# Patient Record
Sex: FEMALE | Race: BLACK OR AFRICAN AMERICAN | NOT HISPANIC OR LATINO | Employment: OTHER | URBAN - METROPOLITAN AREA
[De-identification: names, ages, dates, MRNs, and addresses within clinical notes are randomized per-mention and may not be internally consistent; named-entity substitution may affect disease eponyms.]

---

## 2017-01-23 ENCOUNTER — OFFICE VISIT (OUTPATIENT)
Dept: FAMILY MEDICINE | Facility: CLINIC | Age: 67
End: 2017-01-23
Payer: MEDICARE

## 2017-01-23 VITALS
HEART RATE: 118 BPM | WEIGHT: 205.5 LBS | HEIGHT: 63 IN | DIASTOLIC BLOOD PRESSURE: 78 MMHG | SYSTOLIC BLOOD PRESSURE: 122 MMHG | OXYGEN SATURATION: 96 % | TEMPERATURE: 98 F | BODY MASS INDEX: 36.41 KG/M2

## 2017-01-23 DIAGNOSIS — E78.5 HYPERLIPIDEMIA, UNSPECIFIED HYPERLIPIDEMIA TYPE: ICD-10-CM

## 2017-01-23 DIAGNOSIS — D05.12 DUCTAL CARCINOMA IN SITU (DCIS) OF LEFT BREAST: ICD-10-CM

## 2017-01-23 DIAGNOSIS — E11.9 DIABETES MELLITUS TYPE 2 WITHOUT RETINOPATHY: ICD-10-CM

## 2017-01-23 DIAGNOSIS — J45.909 INTRINSIC ASTHMA, UNSPECIFIED: ICD-10-CM

## 2017-01-23 DIAGNOSIS — I10 ESSENTIAL HYPERTENSION: ICD-10-CM

## 2017-01-23 DIAGNOSIS — Z00.00 ROUTINE MEDICAL EXAM: Primary | ICD-10-CM

## 2017-01-23 DIAGNOSIS — R53.83 FATIGUE, UNSPECIFIED TYPE: ICD-10-CM

## 2017-01-23 DIAGNOSIS — Z86.010 HISTORY OF COLONIC POLYPS: ICD-10-CM

## 2017-01-23 PROBLEM — Z86.0100 HISTORY OF COLONIC POLYPS: Status: ACTIVE | Noted: 2017-01-23

## 2017-01-23 PROBLEM — R10.33 PERIUMBILICAL ABDOMINAL PAIN: Status: ACTIVE | Noted: 2017-01-23

## 2017-01-23 PROBLEM — N20.0 KIDNEY STONES: Status: ACTIVE | Noted: 2017-01-23

## 2017-01-23 PROCEDURE — 90662 IIV NO PRSV INCREASED AG IM: CPT | Mod: S$GLB,,, | Performed by: INTERNAL MEDICINE

## 2017-01-23 PROCEDURE — 90670 PCV13 VACCINE IM: CPT | Mod: S$GLB,,, | Performed by: INTERNAL MEDICINE

## 2017-01-23 PROCEDURE — G0008 ADMIN INFLUENZA VIRUS VAC: HCPCS | Mod: S$GLB,,, | Performed by: INTERNAL MEDICINE

## 2017-01-23 PROCEDURE — 1159F MED LIST DOCD IN RCRD: CPT | Mod: S$GLB,,, | Performed by: INTERNAL MEDICINE

## 2017-01-23 PROCEDURE — 2022F DILAT RTA XM EVC RTNOPTHY: CPT | Mod: S$GLB,,, | Performed by: INTERNAL MEDICINE

## 2017-01-23 PROCEDURE — 99999 PR PBB SHADOW E&M-EST. PATIENT-LVL IV: CPT | Mod: PBBFAC,,, | Performed by: INTERNAL MEDICINE

## 2017-01-23 PROCEDURE — 3074F SYST BP LT 130 MM HG: CPT | Mod: S$GLB,,, | Performed by: INTERNAL MEDICINE

## 2017-01-23 PROCEDURE — 3078F DIAST BP <80 MM HG: CPT | Mod: S$GLB,,, | Performed by: INTERNAL MEDICINE

## 2017-01-23 PROCEDURE — 3046F HEMOGLOBIN A1C LEVEL >9.0%: CPT | Mod: S$GLB,,, | Performed by: INTERNAL MEDICINE

## 2017-01-23 PROCEDURE — 99387 INIT PM E/M NEW PAT 65+ YRS: CPT | Mod: 25,S$GLB,, | Performed by: INTERNAL MEDICINE

## 2017-01-23 PROCEDURE — 99214 OFFICE O/P EST MOD 30 MIN: CPT | Mod: 25,S$GLB,, | Performed by: INTERNAL MEDICINE

## 2017-01-23 PROCEDURE — G0009 ADMIN PNEUMOCOCCAL VACCINE: HCPCS | Mod: S$GLB,,, | Performed by: INTERNAL MEDICINE

## 2017-01-23 PROCEDURE — 4010F ACE/ARB THERAPY RXD/TAKEN: CPT | Mod: S$GLB,,, | Performed by: INTERNAL MEDICINE

## 2017-01-23 RX ORDER — METFORMIN HYDROCHLORIDE EXTENDED-RELEASE TABLETS 1000 MG/1
2000 TABLET, FILM COATED, EXTENDED RELEASE ORAL
Qty: 180 TABLET | Refills: 0 | Status: SHIPPED | OUTPATIENT
Start: 2017-01-23 | End: 2017-04-07 | Stop reason: SDUPTHER

## 2017-01-23 RX ORDER — LOSARTAN POTASSIUM AND HYDROCHLOROTHIAZIDE 25; 100 MG/1; MG/1
1 TABLET ORAL DAILY
Qty: 90 TABLET | Refills: 12 | Status: SHIPPED | OUTPATIENT
Start: 2017-01-23 | End: 2018-01-09 | Stop reason: SDUPTHER

## 2017-01-23 RX ORDER — LEVOCETIRIZINE DIHYDROCHLORIDE 5 MG/1
5 TABLET, FILM COATED ORAL NIGHTLY
Qty: 90 TABLET | Refills: 12 | Status: SHIPPED | OUTPATIENT
Start: 2017-01-23 | End: 2017-04-07

## 2017-01-23 RX ORDER — TRAMADOL HYDROCHLORIDE 50 MG/1
TABLET ORAL
Qty: 100 TABLET | Refills: 3 | Status: SHIPPED | OUTPATIENT
Start: 2017-01-23 | End: 2017-08-23

## 2017-01-23 NOTE — PROGRESS NOTES
Chief complaint: New patient physical     66-year-old black female seen by primary care at [facility] over 3 years ago should she is a new patient again.  The primary care that she has seen documented in [facility] is not an [facility].  She is followed outside for breast cancer screening having recently had some surgery for some carcinoma in situ.  She's up-to-date on her colonoscopy.        ROS:   CONST: weight stable. EYES: no vision change. ENT: no sore throat. CV: no chest pain w/ exertion. RESP: no shortness of breath. GI: no nausea, vomiting, diarrhea. No dysphagia. : no urinary issues. MUSCULOSKELETAL: no new myalgias or arthralgias. SKIN: no new changes. NEURO: no focal deficits. PSYCH: no new issues. ENDOCRINE: no polyuria. HEME: no lymph nodes. ALLERGY: no general pruritis.     Past medical history:  1.  Diabetes  2.  Hypertension  3.  Asthma and COPD, nonsmoker, asthmatic since having children  4.  Arthritis of the knees  5.  Carpal tunnel syndrome  6.  Hyperlipidemia  7.  Cholecystectomy   8.  Left breast ductal carcinoma in situ  9.  Kidney stones - Dr Sargent did laser  10.  Periumbilical pain, EGD, colonoscopy and CT scan done by GI Dr. Ramírez - apparently the kidney stone   11.  Colon polyps , 3 years  12.  Possible underlying sleep apnea  13.  Prevnar given 2017      Family history: Father  at age 80 of prostate cancer.  Mother age 84 with diabetes.  Unspecified diabetes heart problems hypertension in the family.  Brother  of stomach cancer.  4 other brothers in 10 sisters with no stated problems.    Social history: Disabled apparently due to asthma.   13 years with a prior marriage and has 4 children.  Does not smoke and never did.  Does not drink.    Gen: no distress  EYES: conjunctiva clear, non-icteric, PERRL  ENT: nose clear, nasal mucosa normal, oropharynx clear and moist, teeth good  NECK:supple, thyroid non-palpable  RESP: effort is good, lungs  clear  CV: heart RRR w/o murmur, gallops or rubs; no carotid bruits, no edema  GI: abdomen soft, non-distended, non-tender, no hepatosplenomegaly  MS: gait normal, no clubbing or cyanosis of the digits  SKIN: no rashes, warm to touch   Protective Sensation (w/ 10 gram monofilament):  Right: Intact  Left: Intact    Visual Inspection:  Normal -  Bilateral    Pedal Pulses:   Right: Present  Left: Present    Posterior tibialis:   Right:Present  Left: Present    Abigail was seen today for establish care.    Diagnoses and all orders for this visit:    Routine medical exam, new to the clinic, apparently up-to-date on breast cancer screening and colon cancer screening area we discussed the need to improve diet exercise and lose weight and follow-up every 3 months    History of colonic polyps, up-to-date                                                    Additional evaluation and management issues:    Additionally, patient has other medical issues to address today separate from her physical.  She recently had surgery on the left breast for carcinoma in situ.  His been one month but she still has a moderate amount of pain especially if the left rest touch is on something.  She requests a refill of some pain medication such as tramadol and I think that would be appropriate rather than the Percocet at this point.  She does have follow-up with her surgeon.  She has uncontrolled diabetes without any obvious complication.  We did her foot exam today and will refer to optometry.  We will update all her lab work.  She's only been taking 1 metformin 1000 mg per day not due to side effects but just simply because she thought the dose was high.  I explained her the 2000 mg is the total dose and without it she may well need additional meds and may well need more medicine either way.  We will check A1c with a goal below 6.5 and if not ago we will increase it to 2 pills per day.    She needs refills of her antihypertensive which appears to  be under good control.  She has hyperlipidemia with an LDL which was not well controlled and she is not on a statin so we will reassess and address and discuss.  She does have some daytime fatigue causing her to take naps.  We discussed having family assess for sleep apnea symptoms and discussed the benefits and complications of sleep apnea treatment.  She does state her flu shot and pneumonia vaccine today.  She has a history of asthma which occurred after having children but has improved lately.  All the separate issues reviewed and patient counseled an evaluation and management of all the separate issues we based on time counseling.  Total time over 25 minutes with over 50% counseling.        Assessment and plan:          Ductal carcinoma in situ (DCIS) of left breast/Breast pain ongoing after surgery, tramadol prescribed    Uncontrolled type 2 diabetes mellitus without complication, without long-term current use of insulin, Reassess and possibly increased the metformin back to the 2000 mg per day, discussed possible Amaryl, discussed diet improvement  -     Cancel: Ambulatory referral to Podiatry  -     Ambulatory referral to Optometry  -     Hemoglobin A1c; Future  -     Lipid panel; Future  -     Microalbumin/creatinine urine ratio; Future  -     CBC auto differential; Future  -     TSH; Future  -     Comprehensive metabolic panel; Future    Essential hypertension, Chronic and stable    Hyperlipidemia, unspecified hyperlipidemia type, Reassess and likely will need statin    Intrinsic asthma, unspecified, Chronic and stable, provide vaccines    Diabetes mellitus type 2 without retinopathy    Fatigue, unspecified type, Assess for underlying sleep apnea clinically first    Other orders  -     Influenza - High Dose (65+) (PF) (IM)  -     Pneumococcal Conjugate Vaccine (13 Valent) (IM)  -     metformin (FORTAMET) 1,000 mg 24 hr tablet; Take 2 tablets (2,000 mg total) by mouth daily with breakfast.  -      losartan-hydrochlorothiazide 100-25 mg (HYZAAR) 100-25 mg per tablet; Take 1 tablet by mouth once daily.  -     levocetirizine (XYZAL) 5 MG tablet; Take 1 tablet (5 mg total) by mouth every evening.  -     tramadol (ULTRAM) 50 mg tablet; 1-2 q 6hr prn

## 2017-01-27 ENCOUNTER — TELEPHONE (OUTPATIENT)
Dept: FAMILY MEDICINE | Facility: CLINIC | Age: 67
End: 2017-01-27

## 2017-01-27 ENCOUNTER — LAB VISIT (OUTPATIENT)
Dept: LAB | Facility: HOSPITAL | Age: 67
End: 2017-01-27
Attending: INTERNAL MEDICINE
Payer: MEDICARE

## 2017-01-27 LAB
ALBUMIN SERPL BCP-MCNC: 3.9 G/DL
ALP SERPL-CCNC: 88 U/L
ALT SERPL W/O P-5'-P-CCNC: 32 U/L
ANION GAP SERPL CALC-SCNC: 11 MMOL/L
AST SERPL-CCNC: 27 U/L
BASOPHILS # BLD AUTO: 0.02 K/UL
BASOPHILS NFR BLD: 0.2 %
BILIRUB SERPL-MCNC: 0.3 MG/DL
BUN SERPL-MCNC: 17 MG/DL
CALCIUM SERPL-MCNC: 9.7 MG/DL
CHLORIDE SERPL-SCNC: 103 MMOL/L
CHOLEST/HDLC SERPL: 6.7 {RATIO}
CO2 SERPL-SCNC: 26 MMOL/L
CREAT SERPL-MCNC: 1.1 MG/DL
DIFFERENTIAL METHOD: NORMAL
EOSINOPHIL # BLD AUTO: 0.2 K/UL
EOSINOPHIL NFR BLD: 2.5 %
ERYTHROCYTE [DISTWIDTH] IN BLOOD BY AUTOMATED COUNT: 12.8 %
EST. GFR  (AFRICAN AMERICAN): >60 ML/MIN/1.73 M^2
EST. GFR  (NON AFRICAN AMERICAN): 52.8 ML/MIN/1.73 M^2
GLUCOSE SERPL-MCNC: 140 MG/DL
HCT VFR BLD AUTO: 37.8 %
HDL/CHOLESTEROL RATIO: 15 %
HDLC SERPL-MCNC: 227 MG/DL
HDLC SERPL-MCNC: 34 MG/DL
HGB BLD-MCNC: 12.6 G/DL
LDLC SERPL CALC-MCNC: 136.4 MG/DL
LYMPHOCYTES # BLD AUTO: 3.3 K/UL
LYMPHOCYTES NFR BLD: 39.4 %
MCH RBC QN AUTO: 29.8 PG
MCHC RBC AUTO-ENTMCNC: 33.3 %
MCV RBC AUTO: 89 FL
MONOCYTES # BLD AUTO: 0.7 K/UL
MONOCYTES NFR BLD: 8 %
NEUTROPHILS # BLD AUTO: 4.2 K/UL
NEUTROPHILS NFR BLD: 49.8 %
NONHDLC SERPL-MCNC: 193 MG/DL
PLATELET # BLD AUTO: 319 K/UL
PMV BLD AUTO: 11 FL
POTASSIUM SERPL-SCNC: 3.6 MMOL/L
PROT SERPL-MCNC: 8.5 G/DL
RBC # BLD AUTO: 4.23 M/UL
SODIUM SERPL-SCNC: 140 MMOL/L
TRIGL SERPL-MCNC: 283 MG/DL
TSH SERPL DL<=0.005 MIU/L-ACNC: 1.72 UIU/ML
WBC # BLD AUTO: 8.45 K/UL

## 2017-01-27 PROCEDURE — 36415 COLL VENOUS BLD VENIPUNCTURE: CPT | Mod: PO

## 2017-01-27 PROCEDURE — 80061 LIPID PANEL: CPT

## 2017-01-27 PROCEDURE — 83036 HEMOGLOBIN GLYCOSYLATED A1C: CPT

## 2017-01-27 PROCEDURE — 85025 COMPLETE CBC W/AUTO DIFF WBC: CPT

## 2017-01-27 PROCEDURE — 84443 ASSAY THYROID STIM HORMONE: CPT

## 2017-01-27 PROCEDURE — 80053 COMPREHEN METABOLIC PANEL: CPT

## 2017-01-27 NOTE — TELEPHONE ENCOUNTER
Althea Valdivia MA        8:44 AM   Note      Spoke to patient and she states she would like to call back once she is able to afford to get glasses because her insurance does not cover glasses and she needs new ones since she lost the pair she had last year. Informed her that she can call the same number she calls to make an appointment with you when ever she is ready.

## 2017-01-27 NOTE — TELEPHONE ENCOUNTER
----- Message from Vi Nieto sent at 1/26/2017  4:14 PM CST -----  Contact: [phone]  Pt is returning the phone call Please call pt at your earliest convenience.  Thanks!

## 2017-01-28 LAB
ESTIMATED AVG GLUCOSE: 174 MG/DL
HBA1C MFR BLD HPLC: 7.7 %

## 2017-02-16 ENCOUNTER — TELEPHONE (OUTPATIENT)
Dept: FAMILY MEDICINE | Facility: CLINIC | Age: 67
End: 2017-02-16

## 2017-02-16 RX ORDER — ANASTROZOLE 1 MG/1
TABLET ORAL
Refills: 0 | COMMUNITY
Start: 2017-01-30 | End: 2017-08-23

## 2017-02-16 NOTE — TELEPHONE ENCOUNTER
Received medical clearance form for pt to have dental work done. Form in Dr. STONER box please advise

## 2017-04-07 ENCOUNTER — TELEPHONE (OUTPATIENT)
Dept: FAMILY MEDICINE | Facility: CLINIC | Age: 67
End: 2017-04-07

## 2017-04-07 ENCOUNTER — OFFICE VISIT (OUTPATIENT)
Dept: FAMILY MEDICINE | Facility: CLINIC | Age: 67
End: 2017-04-07
Payer: MEDICARE

## 2017-04-07 ENCOUNTER — CLINICAL SUPPORT (OUTPATIENT)
Dept: OPHTHALMOLOGY | Facility: CLINIC | Age: 67
End: 2017-04-07
Attending: INTERNAL MEDICINE
Payer: MEDICARE

## 2017-04-07 VITALS
DIASTOLIC BLOOD PRESSURE: 68 MMHG | BODY MASS INDEX: 36.8 KG/M2 | HEIGHT: 63 IN | OXYGEN SATURATION: 96 % | TEMPERATURE: 99 F | WEIGHT: 207.69 LBS | SYSTOLIC BLOOD PRESSURE: 128 MMHG | HEART RATE: 116 BPM

## 2017-04-07 DIAGNOSIS — J30.2 SEASONAL ALLERGIC RHINITIS, UNSPECIFIED ALLERGIC RHINITIS TRIGGER: ICD-10-CM

## 2017-04-07 DIAGNOSIS — I10 ESSENTIAL HYPERTENSION: ICD-10-CM

## 2017-04-07 DIAGNOSIS — E78.5 HYPERLIPIDEMIA, UNSPECIFIED HYPERLIPIDEMIA TYPE: ICD-10-CM

## 2017-04-07 PROCEDURE — 1159F MED LIST DOCD IN RCRD: CPT | Mod: S$GLB,,, | Performed by: INTERNAL MEDICINE

## 2017-04-07 PROCEDURE — 99214 OFFICE O/P EST MOD 30 MIN: CPT | Mod: S$GLB,,, | Performed by: INTERNAL MEDICINE

## 2017-04-07 PROCEDURE — 4010F ACE/ARB THERAPY RXD/TAKEN: CPT | Mod: S$GLB,,, | Performed by: INTERNAL MEDICINE

## 2017-04-07 PROCEDURE — 3045F PR MOST RECENT HEMOGLOBIN A1C LEVEL 7.0-9.0%: CPT | Mod: S$GLB,,, | Performed by: INTERNAL MEDICINE

## 2017-04-07 PROCEDURE — 92227 IMG RTA DETCJ/MNTR DS STAFF: CPT | Mod: S$GLB,,, | Performed by: OPHTHALMOLOGY

## 2017-04-07 PROCEDURE — 1157F ADVNC CARE PLAN IN RCRD: CPT | Mod: S$GLB,,, | Performed by: INTERNAL MEDICINE

## 2017-04-07 PROCEDURE — 1126F AMNT PAIN NOTED NONE PRSNT: CPT | Mod: S$GLB,,, | Performed by: INTERNAL MEDICINE

## 2017-04-07 PROCEDURE — 1160F RVW MEDS BY RX/DR IN RCRD: CPT | Mod: S$GLB,,, | Performed by: INTERNAL MEDICINE

## 2017-04-07 PROCEDURE — 3074F SYST BP LT 130 MM HG: CPT | Mod: S$GLB,,, | Performed by: INTERNAL MEDICINE

## 2017-04-07 PROCEDURE — 3078F DIAST BP <80 MM HG: CPT | Mod: S$GLB,,, | Performed by: INTERNAL MEDICINE

## 2017-04-07 PROCEDURE — 99999 PR PBB SHADOW E&M-EST. PATIENT-LVL III: CPT | Mod: PBBFAC,,, | Performed by: INTERNAL MEDICINE

## 2017-04-07 PROCEDURE — 99499 UNLISTED E&M SERVICE: CPT | Mod: S$GLB,,, | Performed by: INTERNAL MEDICINE

## 2017-04-07 PROCEDURE — 99999 PR PBB SHADOW E&M-EST. PATIENT-LVL II: CPT | Mod: PBBFAC,,,

## 2017-04-07 RX ORDER — ATORVASTATIN CALCIUM 10 MG/1
10 TABLET, FILM COATED ORAL DAILY
Qty: 90 TABLET | Refills: 3 | Status: SHIPPED | OUTPATIENT
Start: 2017-04-07 | End: 2017-08-23

## 2017-04-07 RX ORDER — METFORMIN HYDROCHLORIDE EXTENDED-RELEASE TABLETS 1000 MG/1
2000 TABLET, FILM COATED, EXTENDED RELEASE ORAL
Qty: 180 TABLET | Refills: 0 | Status: SHIPPED | OUTPATIENT
Start: 2017-04-07 | End: 2017-04-10

## 2017-04-07 RX ORDER — LORATADINE 10 MG/1
10 TABLET ORAL DAILY
Qty: 30 TABLET | Refills: 12 | Status: SHIPPED | OUTPATIENT
Start: 2017-04-07 | End: 2022-01-21

## 2017-04-07 NOTE — TELEPHONE ENCOUNTER
----- Message from [facility] NILDA Bonilla sent at 4/7/2017  3:32 PM CDT -----  Contact: [facility]  Request drug change on metformin (FORTAMET) 1,000 mg 24 hr tablet. Pt's insurance won't cover. Thanks!

## 2017-04-07 NOTE — PROGRESS NOTES
Chief complaint: Follow-up on blood work    66-year-old black female here review labs.  Her A1c of 7.7 is exactly the same as it was a year ago.  She is on maximum metformin.  We discussed the addition of Amaryl as well as dietary improvements and she would like to give diet improvements a period of time before we add other medication.  We discussed her hyperlipidemia with an LDL in the 130s associated with the low HDL and the fact that all diabetics should be on a statin.  She thinks it might of been on a statin in the past but can't remember any side effects.  We will start Lipitor 10 mg.  Her blood pressure appears to be under good control and I encouraged her to monitor more so at home.  She apparently did have a colonoscopy in 2016 and so I put that on health maintenance.  Records also show she might of had polyps in 2015.  She ultimately I'm sure will receive notice from the performing gastric neurologist.  Apparently her insurance will not cover her current prescription antihistamine and discussed that over-the-counter Claritin or Allegra or Zyrtec would be just as adequate and we will try to see if we can get Claritin by prescription since she is on a very limited income.  Because of this also we will set her up for the retina camera for diabetic eye screening since she probably can't afford copayment right now      ROS:   CONST: weight stable.  No vision problems, no neurological problems    Past medical history:  1.  Diabetes  2.  Hypertension  3.  Asthma and COPD, nonsmoker, asthmatic since having children  4.  Arthritis of the knees  5.  Carpal tunnel syndrome  6.  Hyperlipidemia  7.  Cholecystectomy 1999  8.  Left breast ductal carcinoma in situ  9.  Kidney stones - Dr Sargent did laser  10.  Periumbilical pain, EGD, 2016 colonoscopy and CT scan done by GI Dr. Ramírez - apparently the kidney stone   11.  Colon polyps October . 2015, 3 years  12.  Possible underlying sleep apnea  13.  Prevnar given  2017      Family history: Father  at age 80 of prostate cancer.  Mother age 84 with diabetes.  Unspecified diabetes heart problems hypertension in the family.  Brother  of stomach cancer.  4 other brothers in 10 sisters with no stated problems.    Social history: Disabled apparently due to asthma.   13 years with a prior marriage and has 4 children.  Does not smoke and never did.  Does not drink.    Gen: no distress  Abigail was seen today for diabetes and hypertension.    Diagnoses and all orders for this visit:    Uncontrolled type 2 diabetes mellitus without complication, without long-term current use of insulin, discussed the uncontrolled nature of her diabetes, additional medications, lifestyle modifications that need to take place.  We will arrange for retinopathy screening, refilled her metformin and she will follow-up when refills are needed but lab work.  Counseled regarding her diabetes lipids HDL hypertension and ALLERGIES.Total time over 25 minutes with over 50% counseling.  -     Diabetic Eye Screening Photo; Future    Hyperlipidemia, unspecified hyperlipidemia type, associated with low HDL, start statin    Essential hypertension, monitor at home more often, appears okay    Seasonal allergic rhinitis, unspecified allergic rhinitis trigger, continue antihistamine of choice    Other orders  -     Cancel: Ambulatory referral to Optometry  -     Cancel: Case request GI: COLONOSCOPY  -     Cancel: Hepatitis panel, acute; Future  -     atorvastatin (LIPITOR) 10 MG tablet; Take 1 tablet (10 mg total) by mouth once daily.  -     metformin (FORTAMET) 1,000 mg 24 hr tablet; Take 2 tablets (2,000 mg total) by mouth daily with breakfast.  -     loratadine (CLARITIN) 10 mg tablet; Take 1 tablet (10 mg total) by mouth once daily.

## 2017-04-07 NOTE — PROGRESS NOTES
HPI     67  Year here for screening for diabetic retinopathy with non dilated   fundus photo per Dr Rangel        Last edited by Zara Kim MA on 4/7/2017 12:04 PM.         Assessment /Plan     For exam results, see Encounter Report.    Uncontrolled type 2 diabetes mellitus without complication, without long-term current use of insulin  -     Diabetic Eye Screening Photo        67 year here for screening for diabetic retinopathy fundus photo please see Dr Oneill for interpretation

## 2017-04-07 NOTE — TELEPHONE ENCOUNTER
[employer] does not cover metformin extended release or long acting, has to be the plain dose. Please advise

## 2017-04-10 ENCOUNTER — TELEPHONE (OUTPATIENT)
Dept: OPHTHALMOLOGY | Facility: CLINIC | Age: 67
End: 2017-04-10

## 2017-04-10 RX ORDER — METFORMIN HYDROCHLORIDE 500 MG/1
1000 TABLET, EXTENDED RELEASE ORAL 2 TIMES DAILY
Qty: 120 TABLET | Refills: 3 | Status: SHIPPED | OUTPATIENT
Start: 2017-04-10 | End: 2017-08-02 | Stop reason: SDUPTHER

## 2017-04-10 NOTE — TELEPHONE ENCOUNTER
Notified pt of eye cam results, no bdr ou recommending to follow up in 4-6 mos with primary eye care physician.  Pt does not have interest in [facility] due to not wanting to come out of pocket.  Explained this would be medical and only if she had refraction/va checked the cost would be $40. Plus copay.  Pt declined stating she is going to look into it and if unable to find doctor that takes ins. At no cost, she will call back.

## 2017-04-13 DIAGNOSIS — Z11.59 NEED FOR HEPATITIS C SCREENING TEST: ICD-10-CM

## 2017-08-03 RX ORDER — METFORMIN HYDROCHLORIDE 500 MG/1
TABLET, EXTENDED RELEASE ORAL
Qty: 120 TABLET | Refills: 0 | Status: SHIPPED | OUTPATIENT
Start: 2017-08-03

## 2017-08-23 ENCOUNTER — OFFICE VISIT (OUTPATIENT)
Dept: PODIATRY | Facility: CLINIC | Age: 67
End: 2017-08-23
Payer: MEDICARE

## 2017-08-23 VITALS
BODY MASS INDEX: 36.8 KG/M2 | DIASTOLIC BLOOD PRESSURE: 72 MMHG | WEIGHT: 207.69 LBS | HEIGHT: 63 IN | SYSTOLIC BLOOD PRESSURE: 120 MMHG

## 2017-08-23 DIAGNOSIS — M79.672 FOOT PAIN, BILATERAL: ICD-10-CM

## 2017-08-23 DIAGNOSIS — M79.671 FOOT PAIN, BILATERAL: ICD-10-CM

## 2017-08-23 DIAGNOSIS — Q66.72 PES CAVUS OF LEFT FOOT: ICD-10-CM

## 2017-08-23 DIAGNOSIS — M20.42 HAMMER TOES OF BOTH FEET: ICD-10-CM

## 2017-08-23 DIAGNOSIS — E11.49 TYPE II DIABETES MELLITUS WITH NEUROLOGICAL MANIFESTATIONS: Primary | ICD-10-CM

## 2017-08-23 DIAGNOSIS — L84 CORN OR CALLUS: ICD-10-CM

## 2017-08-23 DIAGNOSIS — M20.41 HAMMER TOES OF BOTH FEET: ICD-10-CM

## 2017-08-23 DIAGNOSIS — Q66.71 PES CAVUS OF RIGHT FOOT: ICD-10-CM

## 2017-08-23 DIAGNOSIS — R20.2 PARESTHESIAS: ICD-10-CM

## 2017-08-23 PROCEDURE — 3045F PR MOST RECENT HEMOGLOBIN A1C LEVEL 7.0-9.0%: CPT | Mod: S$GLB,,, | Performed by: PODIATRIST

## 2017-08-23 PROCEDURE — 3074F SYST BP LT 130 MM HG: CPT | Mod: S$GLB,,, | Performed by: PODIATRIST

## 2017-08-23 PROCEDURE — 1159F MED LIST DOCD IN RCRD: CPT | Mod: S$GLB,,, | Performed by: PODIATRIST

## 2017-08-23 PROCEDURE — 99999 PR PBB SHADOW E&M-EST. PATIENT-LVL II: CPT | Mod: PBBFAC,,, | Performed by: PODIATRIST

## 2017-08-23 PROCEDURE — 99499 UNLISTED E&M SERVICE: CPT | Mod: S$GLB,,, | Performed by: PODIATRIST

## 2017-08-23 PROCEDURE — 99203 OFFICE O/P NEW LOW 30 MIN: CPT | Mod: S$GLB,,, | Performed by: PODIATRIST

## 2017-08-23 PROCEDURE — 4010F ACE/ARB THERAPY RXD/TAKEN: CPT | Mod: S$GLB,,, | Performed by: PODIATRIST

## 2017-08-23 PROCEDURE — 3008F BODY MASS INDEX DOCD: CPT | Mod: S$GLB,,, | Performed by: PODIATRIST

## 2017-08-23 PROCEDURE — 1125F AMNT PAIN NOTED PAIN PRSNT: CPT | Mod: S$GLB,,, | Performed by: PODIATRIST

## 2017-08-23 PROCEDURE — 3078F DIAST BP <80 MM HG: CPT | Mod: S$GLB,,, | Performed by: PODIATRIST

## 2017-08-23 RX ORDER — ECONAZOLE NITRATE 10 MG/G
CREAM TOPICAL
COMMUNITY
Start: 2017-08-02 | End: 2017-08-23

## 2017-08-23 RX ORDER — ALCLOMETASONE DIPROPIONATE 0.5 MG/G
CREAM TOPICAL
COMMUNITY
Start: 2017-08-02 | End: 2017-08-23 | Stop reason: ALTCHOICE

## 2017-08-23 RX ORDER — ANASTROZOLE 1 MG/1
1 TABLET ORAL DAILY
COMMUNITY
End: 2022-01-20 | Stop reason: CLARIF

## 2017-08-23 RX ORDER — TRAMADOL HYDROCHLORIDE 50 MG/1
50 TABLET ORAL EVERY 8 HOURS PRN
Qty: 30 TABLET | Refills: 0 | Status: SHIPPED | OUTPATIENT
Start: 2017-08-23 | End: 2017-09-22

## 2017-08-23 NOTE — PROGRESS NOTES
Subjective:      Patient ID: Abigail Pierre is a 67 y.o. female.    Chief Complaint: Diabetes Mellitus (pcp Rm/ 4-7-17); Diabetic Foot Exam; Nail Care; Callouses; and Foot Pain    Abigail is a 67 y.o. female who presents to the clinic upon referral from Dr. Washington  for evaluation and treatment of diabetic feet. Abigail has a past medical history of Arthritis; Carpal tunnel syndrome (1/2/2014); COPD (chronic obstructive pulmonary disease); Diabetes mellitus type II, uncontrolled (1/23/2017); Diabetes mellitus, type 2; DJD (degenerative joint disease) of knee (1/2/2014); Ductal carcinoma in situ (DCIS) of left breast (12/22/2016); History of colonic polyps (1/23/2017); Hyperlipidemia; Hypertension; Intrinsic asthma, unspecified (1/2/2014); Kidney stones (1/23/2017); and Periumbilical abdominal pain (1/23/2017). Patient relates no major problem with feet. Only complaints today consist of painful calluses on bottom of both feet. These have been present for years. Has tried moisturizer and DM shoes > 2 years ago per direction of previous Podiatrist. These helped temporarily. Now due to insurance change she is here for establishing care. Also has mild tingling/burning in feet. lyrica helps symptoms but does not cover all of pain. Inquiring about other options. Tramadol helps but she is out.    PCP: Danilo Rangel MD    Date Last Seen by PCP:   Chief Complaint   Patient presents with    Diabetes Mellitus     pcp Rm/ 4-7-17    Diabetic Foot Exam    Nail Care    Callouses    Foot Pain       Current shoe gear: Flip-flops    Hemoglobin A1C   Date Value Ref Range Status   01/27/2017 7.7 (H) 4.5 - 6.2 % Final     Comment:     According to ADA guidelines, hemoglobin A1C <7.0% represents  optimal control in non-pregnant diabetic patients.  Different  metrics may apply to specific populations.   Standards of Medical Care in Diabetes - 2016.  For the purpose of screening for the presence of diabetes:  <5.7%      Consistent with the absence of diabetes  5.7-6.4%  Consistent with increasing risk for diabetes   (prediabetes)  >or=6.5%  Consistent with diabetes  Currently no consensus exists for use of hemoglobin A1C  for diagnosis of diabetes for children.       Past Medical History:   Diagnosis Date    Arthritis     Carpal tunnel syndrome 1/2/2014    COPD (chronic obstructive pulmonary disease)     Diabetes mellitus type II, uncontrolled 1/23/2017    Diabetes mellitus, type 2     DJD (degenerative joint disease) of knee 1/2/2014    Ductal carcinoma in situ (DCIS) of left breast 12/22/2016    History of colonic polyps 1/23/2017    Around 2015    Hyperlipidemia     Hypertension     Intrinsic asthma, unspecified 1/2/2014    Kidney stones 1/23/2017    On CT done by GI in 2015 -sent to Dr Sargent?    Periumbilical abdominal pain 1/23/2017    Seen by Cristhian Mena 10/15 -ct, colon and EGD (normal EGD)       Past Surgical History:   Procedure Laterality Date    BREAST SURGERY      CHOLECYSTECTOMY      CYSTOSCOPY W/ LASER LITHOTRIPSY         Family History   Problem Relation Age of Onset    Colon cancer Father     Heart disease Mother     Hypertension Mother     Diabetes Mother     Hyperlipidemia Mother     Breast cancer Neg Hx     Ovarian cancer Neg Hx        Social History     Social History    Marital status:      Spouse name: N/A    Number of children: N/A    Years of education: N/A     Social History Main Topics    Smoking status: Never Smoker    Smokeless tobacco: Never Used    Alcohol use No    Drug use: No    Sexual activity: Not Asked     Other Topics Concern    None     Social History Narrative    None       Current Outpatient Prescriptions   Medication Sig Dispense Refill    anastrozole (ARIMIDEX) 1 mg Tab Take 1 mg by mouth once daily.      loratadine (CLARITIN) 10 mg tablet Take 1 tablet (10 mg total) by mouth once daily. 30 tablet 12    losartan-hydrochlorothiazide 100-25 mg  (HYZAAR) 100-25 mg per tablet Take 1 tablet by mouth once daily. 90 tablet 12    metformin (GLUCOPHAGE-XR) 500 MG 24 hr tablet TAKE 2 TABLETS(1000 MG) BY MOUTH TWICE DAILY 120 tablet 0    tramadol (ULTRAM) 50 mg tablet Take 1 tablet (50 mg total) by mouth every 8 (eight) hours as needed for Pain. 30 tablet 0     No current facility-administered medications for this visit.        Review of patient's allergies indicates:   Allergen Reactions    Sulfa (sulfonamide antibiotics) Itching and Rash           Review of Systems   Constitution: Negative for chills and fever.   Cardiovascular: Negative for chest pain, claudication and leg swelling.   Respiratory: Negative for cough and shortness of breath.    Skin: Positive for dry skin and suspicious lesions (calluses).   Musculoskeletal:        Foot/toe pain   Gastrointestinal: Negative for nausea and vomiting.   Neurological: Positive for paresthesias. Negative for numbness.   Psychiatric/Behavioral: Negative for altered mental status.           Objective:      Physical Exam   Constitutional: She is oriented to person, place, and time. She appears well-developed and well-nourished.   HENT:   Head: Normocephalic.   Cardiovascular: Intact distal pulses.    Pulses:       Dorsalis pedis pulses are 2+ on the right side, and 2+ on the left side.        Posterior tibial pulses are 2+ on the right side, and 2+ on the left side.   CRT < 3 sec to tips of toes. No edema noted to b/l LE. No vericosities noted to b/l LEs.      Pulmonary/Chest: No respiratory distress.   Musculoskeletal:   Gastrocnemius equinus noted to b/l ankles with decreased DF noted on exam. MMT 5/5 in DF/PF/Inv/Ev resistance with no reproduction of pain in any direction. Passive range of motion of ankle and pedal joints is painless. Adequate pedal joint ROM.     Semi-reducible hammertoe contractures noted to toes 2-4 b/l-asymptomatic. HAV, mild, non trackbound noted b/l with mild medial bony prominence at 1st met  head--asymptomatic.     Plantar flexed, semi flexible 1st ray bilateral.     Mild pain with palpation of hyperkeratotic lesions on plantar 5th met heads and dorsal 5th toe pipjs b/l.    Neurological: She is alert and oriented to person, place, and time. She has normal strength. A sensory deficit is present.   Light touch, proprioception, and sharp/dull sensation are all intact bilaterally. Protective threshold with the Laurel-Wienstein monofilament is intact bilaterally. Subjective paresthesias with no clearly identifiable source or trigger.        Skin: Skin is warm, dry and intact. No erythema.   No open lesions, lacerations or wounds noted. Nails are normotrophic and well trimmed to R 1-5 and L 1-5. Interdigital spaces clean, dry and intact b/l. No erythema noted to b/l foot. Skin texture normal. Pedal hair normal. Skin temperature normal b/l foot.     Focal hyperkeratotic lesion noted to plantar 5th met heads b/l and dorsal PIPJ of bilateral 5th toes. Skin lines present, no signs of deep tissue injury.      Psychiatric: She has a normal mood and affect. Her behavior is normal. Judgment and thought content normal.   Vitals reviewed.            Assessment:       Encounter Diagnoses   Name Primary?    Type II diabetes mellitus with neurological manifestations Yes    Paresthesias     Corn or callus     Pes cavus of left foot     Pes cavus of right foot     Hammer toes of both feet     Foot pain, bilateral          Plan:       Abigail was seen today for diabetes mellitus, diabetic foot exam, nail care, callouses and foot pain.    Diagnoses and all orders for this visit:    Type II diabetes mellitus with neurological manifestations  -     DIABETIC SHOES FOR HOME USE    Paresthesias  -     DIABETIC SHOES FOR HOME USE    Corn or callus  -     DIABETIC SHOES FOR HOME USE    Pes cavus of left foot  -     DIABETIC SHOES FOR HOME USE    Pes cavus of right foot  -     DIABETIC SHOES FOR HOME USE    Hammer toes of both  feet  -     DIABETIC SHOES FOR HOME USE    Foot pain, bilateral  -     DIABETIC SHOES FOR HOME USE    Other orders  -     tramadol (ULTRAM) 50 mg tablet; Take 1 tablet (50 mg total) by mouth every 8 (eight) hours as needed for Pain.      I counseled the patient on her conditions, their implications and medical management.    - Shoe inspection. Diabetic Foot Education. Patient reminded of the importance of good nutrition and blood sugar control to help prevent podiatric complications of diabetes. Patient instructed on proper foot hygeine. We discussed wearing proper shoe gear, daily foot inspections, never walking without protective shoe gear, caution putting sharp instruments to feet     - Discussed DM foot care:  Wear comfortable, proper fitting shoes. Wash feet daily. Dry well. After drying, apply moisturizer to feet (no lotion to webspaces). Inspect feet daily for skin breaks, blisters, swelling, or redness. Wear cotton socks (preferably white)  Change socks every day. Do NOT walk barefoot. Do NOT use heating pads or warm/hot water soaks     - The affected area was cleansed with an alcohol prep pad. Next, utilizing a No.15 scalpel, the hyperkeratotic tissues were trimmed from b/l plantar 5th met heads and dorsal 5th toe pipjs b/l (AS COURTESY), down to appropriate level of skin. Care was taken to remove any nucleated core from the center of the lesion. No pinpoint bleeding was encountered. The patient tolerated relief following this procedure.     - Rx tramadol 50mg q6h for pain.  Advised to take as directed only when pain is severe.     - Rx compound pain cream to be applied to areas of paresthesias up to 4-5 x daily as needed for pain. Prescription faxed to [facility].     Rx diabetic shoes with custom molded inserts to be worn at all times while ambulating. Prescription provided with list of local retailers.     RTC 1 year, sooner PRN. Patient is aware that routine trimming of nails/calluses  will not be covered service per insurance and he/she will fall under Proc B if this service is desired. Patient verbalized understanding of this. He will RTC as needed for Proc B or any other pedal complaint.

## 2017-08-24 ENCOUNTER — TELEPHONE (OUTPATIENT)
Dept: PODIATRY | Facility: CLINIC | Age: 67
End: 2017-08-24

## 2017-08-24 RX ORDER — DICLOFENAC SODIUM 10 MG/G
2 GEL TOPICAL DAILY
Qty: 1 TUBE | Refills: 3 | Status: SHIPPED | OUTPATIENT
Start: 2017-08-24 | End: 2022-01-19 | Stop reason: CLARIF

## 2017-08-24 NOTE — TELEPHONE ENCOUNTER
----- Message from Concetta John sent at 8/24/2017  9:29 AM CDT -----  Contact: SELF  Calling regarding - Rx compound pain cream . She states it is too expensive . Is there anything else that can be called in ? [phone]   LL

## 2017-08-24 NOTE — TELEPHONE ENCOUNTER
----- Message from Randi Corbin DPM sent at 8/24/2017 10:44 AM CDT -----  Contact: SELF  Theres Topical voltaren gel or Gabapentin pills. Which would she like? I can send either or both to her pharmacy.   ----- Message -----  From: Ritu Milan MA  Sent: 8/24/2017   9:42 AM  To: Randi Corbin DPM    What should I recommend  ----- Message -----  From: Concetta John  Sent: 8/24/2017   9:29 AM  To: Shaquille Bryan Staff    Calling regarding - Rx compound pain cream . She states it is too expensive . Is there anything else that can be called in ? [phone]   LL

## 2017-09-06 RX ORDER — METFORMIN HYDROCHLORIDE 500 MG/1
TABLET, EXTENDED RELEASE ORAL
Qty: 120 TABLET | Refills: 0 | Status: SHIPPED | OUTPATIENT
Start: 2017-09-06 | End: 2022-01-19 | Stop reason: CLARIF

## 2017-09-29 RX ORDER — METFORMIN HYDROCHLORIDE 500 MG/1
TABLET, EXTENDED RELEASE ORAL
Qty: 120 TABLET | Refills: 0 | Status: SHIPPED | OUTPATIENT
Start: 2017-09-29 | End: 2017-10-28 | Stop reason: SDUPTHER

## 2017-10-28 ENCOUNTER — TELEPHONE (OUTPATIENT)
Dept: FAMILY MEDICINE | Facility: CLINIC | Age: 67
End: 2017-10-28

## 2017-10-28 RX ORDER — METFORMIN HYDROCHLORIDE 500 MG/1
TABLET, EXTENDED RELEASE ORAL
Qty: 60 TABLET | Refills: 0 | Status: SHIPPED | OUTPATIENT
Start: 2017-10-28 | End: 2018-01-09 | Stop reason: SDUPTHER

## 2017-10-28 NOTE — TELEPHONE ENCOUNTER
----- Message from Sera Graf sent at 10/27/2017  2:17 PM CDT -----  Contact: SELF  REFILL: metformin (GLUCOPHAGE-XR) 500 MG 24 hr tablet    PLEASE SEND TO [facility]

## 2017-10-28 NOTE — TELEPHONE ENCOUNTER
Overdue for labs, per last refill. Which labs would you like her to have, please order, thank you.

## 2017-11-07 NOTE — TELEPHONE ENCOUNTER
Called patient to schedule lab appointment Follow Up appointment no answer left message to call office.

## 2017-11-08 NOTE — TELEPHONE ENCOUNTER
Called patient to schedule lab appointment follow up appointment to see . No answer left message to call clinic to schedule.

## 2018-01-24 RX ORDER — LOSARTAN POTASSIUM AND HYDROCHLOROTHIAZIDE 25; 100 MG/1; MG/1
TABLET ORAL
Qty: 90 TABLET | Refills: 0 | Status: SHIPPED | OUTPATIENT
Start: 2018-01-24 | End: 2022-01-21

## 2018-02-06 RX ORDER — METFORMIN HYDROCHLORIDE 500 MG/1
TABLET, EXTENDED RELEASE ORAL
Qty: 120 TABLET | Refills: 2 | Status: SHIPPED | OUTPATIENT
Start: 2018-02-06 | End: 2018-05-14 | Stop reason: SDUPTHER

## 2018-02-07 RX ORDER — METFORMIN HYDROCHLORIDE 500 MG/1
TABLET, EXTENDED RELEASE ORAL
Qty: 120 TABLET | Refills: 0 | OUTPATIENT
Start: 2018-02-07

## 2018-04-05 DIAGNOSIS — E11.9 TYPE 2 DIABETES MELLITUS WITHOUT COMPLICATION: ICD-10-CM

## 2018-05-14 RX ORDER — METFORMIN HYDROCHLORIDE 500 MG/1
TABLET, EXTENDED RELEASE ORAL
Qty: 120 TABLET | Refills: 1 | Status: SHIPPED | OUTPATIENT
Start: 2018-05-14 | End: 2018-06-15 | Stop reason: SDUPTHER

## 2018-06-14 DIAGNOSIS — Z11.59 NEED FOR HEPATITIS C SCREENING TEST: ICD-10-CM

## 2018-06-15 RX ORDER — METFORMIN HYDROCHLORIDE 500 MG/1
TABLET, EXTENDED RELEASE ORAL
Qty: 120 TABLET | Refills: 0 | Status: SHIPPED | OUTPATIENT
Start: 2018-06-15 | End: 2022-01-19 | Stop reason: CLARIF

## 2020-08-04 ENCOUNTER — OFFICE VISIT (OUTPATIENT)
Dept: PODIATRY | Facility: CLINIC | Age: 70
End: 2020-08-04
Payer: MEDICARE

## 2020-08-04 DIAGNOSIS — L84 CORN OR CALLUS: ICD-10-CM

## 2020-08-04 DIAGNOSIS — E11.49 TYPE II DIABETES MELLITUS WITH NEUROLOGICAL MANIFESTATIONS: Primary | ICD-10-CM

## 2020-08-04 DIAGNOSIS — M79.2 NEUROPATHIC PAIN: ICD-10-CM

## 2020-08-04 PROCEDURE — 99213 PR OFFICE/OUTPT VISIT, EST, LEVL III, 20-29 MIN: ICD-10-PCS | Mod: S$GLB,,, | Performed by: PODIATRIST

## 2020-08-04 PROCEDURE — 99213 OFFICE O/P EST LOW 20 MIN: CPT | Mod: S$GLB,,, | Performed by: PODIATRIST

## 2020-08-04 PROCEDURE — 99999 PR PBB SHADOW E&M-EST. PATIENT-LVL II: CPT | Mod: PBBFAC,,, | Performed by: PODIATRIST

## 2020-08-04 PROCEDURE — 1159F MED LIST DOCD IN RCRD: CPT | Mod: S$GLB,,, | Performed by: PODIATRIST

## 2020-08-04 PROCEDURE — 1159F PR MEDICATION LIST DOCUMENTED IN MEDICAL RECORD: ICD-10-PCS | Mod: S$GLB,,, | Performed by: PODIATRIST

## 2020-08-04 PROCEDURE — 3052F PR MOST RECENT HEMOGLOBIN A1C LEVEL 8.0 - < 9.0%: ICD-10-PCS | Mod: CPTII,S$GLB,, | Performed by: PODIATRIST

## 2020-08-04 PROCEDURE — 99999 PR PBB SHADOW E&M-EST. PATIENT-LVL II: ICD-10-PCS | Mod: PBBFAC,,, | Performed by: PODIATRIST

## 2020-08-04 PROCEDURE — 3052F HG A1C>EQUAL 8.0%<EQUAL 9.0%: CPT | Mod: CPTII,S$GLB,, | Performed by: PODIATRIST

## 2020-08-04 RX ORDER — DICLOFENAC SODIUM 10 MG/G
2 GEL TOPICAL DAILY
Qty: 100 G | Refills: 3 | Status: ON HOLD | OUTPATIENT
Start: 2020-08-04 | End: 2022-02-14 | Stop reason: HOSPADM

## 2020-08-04 RX ORDER — KETOCONAZOLE 20 MG/G
CREAM TOPICAL DAILY
Qty: 1 TUBE | Refills: 3 | Status: SHIPPED | OUTPATIENT
Start: 2020-08-04

## 2020-08-05 NOTE — PROGRESS NOTES
Subjective:      Patient ID: Abigail Pierre is a 70 y.o. female.    Chief Complaint: No chief complaint on file.    Abigail is a 70 y.o. female who presents to the clinic upon referral from Dr. Washington  for evaluation and treatment of diabetic feet. Abigail has a past medical history of Arthritis, Carpal tunnel syndrome (1/2/2014), COPD (chronic obstructive pulmonary disease), Diabetes mellitus type II, uncontrolled (1/23/2017), Diabetes mellitus, type 2, DJD (degenerative joint disease) of knee (1/2/2014), Ductal carcinoma in situ (DCIS) of left breast (12/22/2016), History of colonic polyps (1/23/2017), Hyperlipidemia, Hypertension, Intrinsic asthma, unspecified (1/2/2014), Kidney stones (1/23/2017), and Periumbilical abdominal pain (1/23/2017). Presents for diabetic foot risk assessment.     PCP: Danilo Rangel MD    Date Last Seen by PCP: none found     Current shoe gear: Tennis shoes    Hemoglobin A1C   Date Value Ref Range Status   01/27/2017 7.7 (H) 4.5 - 6.2 % Final     Comment:     According to ADA guidelines, hemoglobin A1C <7.0% represents  optimal control in non-pregnant diabetic patients.  Different  metrics may apply to specific populations.   Standards of Medical Care in Diabetes - 2016.  For the purpose of screening for the presence of diabetes:  <5.7%     Consistent with the absence of diabetes  5.7-6.4%  Consistent with increasing risk for diabetes   (prediabetes)  >or=6.5%  Consistent with diabetes  Currently no consensus exists for use of hemoglobin A1C  for diagnosis of diabetes for children.       Hemoglobin A1c   Date Value Ref Range Status   02/26/2020 8.4 (H) <5.7 % of total Hgb Final     Comment:     For someone without known diabetes, a hemoglobin A1c  value of 6.5% or greater indicates that they may have   diabetes and this should be confirmed with a follow-up   test.    For someone with known diabetes, a value <7% indicates   that their diabetes is well controlled and a value   greater  than or equal to 7% indicates suboptimal   control. A1c targets should be individualized based on   duration of diabetes, age, comorbid conditions, and   other considerations.    Currently, no consensus exists regarding use of  hemoglobin A1c for diagnosis of diabetes for children.               Review of Systems   Constitution: Negative for chills.   Cardiovascular: Negative for chest pain and claudication.   Respiratory: Negative for cough.    Skin: Positive for color change, dry skin and nail changes.   Musculoskeletal: Positive for joint pain.   Gastrointestinal: Negative for nausea.   Neurological: Positive for paresthesias. Negative for numbness.           Objective:      Physical Exam  Constitutional:       Appearance: She is well-developed.      Comments: Oriented to time, place, and person.   Cardiovascular:      Comments: DP and PT pulses are palpable bilaterally. 3 sec capillary refill time and toes and feet are warm to touch proximally .  There is  hair growth on the feet and toes b/l. There is no edema b/l. No spider veins or varicosities present b/l.     Musculoskeletal:      Comments: Equinus noted b/l ankles with < 10 deg DF noted. MMT 5/5 in DF/PF/Inv/Ev resistance with no reproduction of pain in any direction. Passive range of motion of ankle and pedal joints is painless b/l.     Feet:      Right foot:      Skin integrity: No callus or dry skin.      Left foot:      Skin integrity: No callus or dry skin.   Lymphadenopathy:      Comments: Negative lymphadenopathy bilateral popliteal fossa and tarsal tunnel.   Skin:     Comments: No open lesions, lacerations or wounds noted.Interdigital spaces clean, dry and intact b/l. No erythema noted to b/l foot.  Nails normal color and trophic qualities.    Focal hyperkeratotic lesion consisting entirely of hyperkeratotic tissue without open skin, drainage, pus, fluctuance, malodor, or signs of infection: B/L plantar forefoot. submet 5    Scaling dryness in a  moccasin distribution is noted to the bilateral lower extremities.       Neurological:      Mental Status: She is alert.      Comments: Light touch, proprioception, and sharp/dull sensation are all intact bilaterally. Protective threshold with the Lacarne-Wienstein monofilament is intact bilaterally.      Subjective paresthesias with no clearly identifiable source or trigger.      Psychiatric:         Behavior: Behavior is cooperative.               Assessment:       Encounter Diagnoses   Name Primary?    Type II diabetes mellitus with neurological manifestations Yes    Neuropathic pain     Corn or callus          Plan:       Diagnoses and all orders for this visit:    Type II diabetes mellitus with neurological manifestations  -     Ambulatory referral/consult to Pain Clinic; Future    Neuropathic pain  -     Ambulatory referral/consult to Pain Clinic; Future    [place] or callus  -     Ambulatory referral/consult to Pain Clinic; Future    Other orders  -     diclofenac sodium (VOLTAREN) 1 % Gel; Apply 2 g topically once daily.  -     ketoconazole (NIZORAL) 2 % cream; Apply topically once daily.      I counseled the patient on her conditions, their implications and medical management.    Referral placed to pain clinic- neuropathic pain B/L     - Shoe inspection. Diabetic Foot Education. Patient reminded of the importance of good nutrition and blood sugar control to help prevent podiatric complications of diabetes. Patient instructed on proper foot hygeine. We discussed wearing proper shoe gear, daily foot inspections, never walking without protective shoe gear, caution putting sharp instruments to feet     - Discussed DM foot care:  Wear comfortable, proper fitting shoes. Wash feet daily. Dry well. After drying, apply moisturizer to feet (no lotion to webspaces). Inspect feet daily for skin breaks, blisters, swelling, or redness. Wear cotton socks (preferably white)  Change socks every day. Do NOT walk barefoot. Do  NOT use heating pads or warm/hot water soaks   - After cleansing the area w/ alcohol prep pad the above mentioned hyperkeratosis was trimmed utilizing No 15 scapel, to a smooth base with out incident. B/L plantar  Forefoot submet 5    Ketoconazole 2% topical cream prescribed for treatment of aforementioned tinea pedis. Patient will use this medication as directed in addition to thourougly drying between toes daily, and applying powder as needed    Rx Voltaren gel to be applied to affected area up to 3-4 x daily as needed for pain    F/u one year DM foot exam.

## 2020-12-09 LAB — PAP RECOMMENDATION EXT: NORMAL

## 2021-02-26 ENCOUNTER — IMMUNIZATION (OUTPATIENT)
Dept: INTERNAL MEDICINE | Facility: CLINIC | Age: 71
End: 2021-02-26
Payer: MEDICARE

## 2021-02-26 DIAGNOSIS — Z23 NEED FOR VACCINATION: Primary | ICD-10-CM

## 2021-02-26 PROCEDURE — 91300 COVID-19, MRNA, LNP-S, PF, 30 MCG/0.3 ML DOSE VACCINE: CPT | Mod: PBBFAC | Performed by: INTERNAL MEDICINE

## 2021-03-19 ENCOUNTER — IMMUNIZATION (OUTPATIENT)
Dept: INTERNAL MEDICINE | Facility: CLINIC | Age: 71
End: 2021-03-19
Payer: MEDICARE

## 2021-03-19 DIAGNOSIS — Z23 NEED FOR VACCINATION: Primary | ICD-10-CM

## 2021-03-19 PROCEDURE — 91300 COVID-19, MRNA, LNP-S, PF, 30 MCG/0.3 ML DOSE VACCINE: CPT | Mod: PBBFAC | Performed by: INTERNAL MEDICINE

## 2021-03-19 PROCEDURE — 0002A COVID-19, MRNA, LNP-S, PF, 30 MCG/0.3 ML DOSE VACCINE: CPT | Mod: PBBFAC | Performed by: INTERNAL MEDICINE

## 2021-11-16 ENCOUNTER — HOSPITAL ENCOUNTER (EMERGENCY)
Facility: HOSPITAL | Age: 71
Discharge: HOME | End: 2021-11-16
Attending: EMERGENCY MEDICINE
Payer: MEDICARE

## 2021-11-16 VITALS
DIASTOLIC BLOOD PRESSURE: 87 MMHG | RESPIRATION RATE: 18 BRPM | SYSTOLIC BLOOD PRESSURE: 182 MMHG | BODY MASS INDEX: 35.08 KG/M2 | HEIGHT: 63 IN | TEMPERATURE: 99 F | OXYGEN SATURATION: 98 % | HEART RATE: 97 BPM | WEIGHT: 198 LBS

## 2021-11-16 DIAGNOSIS — M79.673 HEEL PAIN: Primary | ICD-10-CM

## 2021-11-16 DIAGNOSIS — M79.672 FOOT PAIN, LEFT: ICD-10-CM

## 2021-11-16 LAB — POCT GLUCOSE: 173 MG/DL (ref 70–110)

## 2021-11-16 PROCEDURE — 99284 EMERGENCY DEPT VISIT MOD MDM: CPT | Mod: 25,ER

## 2021-11-16 PROCEDURE — 96372 THER/PROPH/DIAG INJ SC/IM: CPT | Mod: ER

## 2021-11-16 PROCEDURE — 82962 GLUCOSE BLOOD TEST: CPT | Mod: ER

## 2021-11-16 PROCEDURE — 63600175 PHARM REV CODE 636 W HCPCS: Mod: ER | Performed by: EMERGENCY MEDICINE

## 2021-11-16 PROCEDURE — 25000003 PHARM REV CODE 250: Mod: ER | Performed by: EMERGENCY MEDICINE

## 2021-11-16 RX ORDER — KETOROLAC TROMETHAMINE 30 MG/ML
15 INJECTION, SOLUTION INTRAMUSCULAR; INTRAVENOUS
Status: COMPLETED | OUTPATIENT
Start: 2021-11-16 | End: 2021-11-16

## 2021-11-16 RX ORDER — KETOROLAC TROMETHAMINE 10 MG/1
10 TABLET, FILM COATED ORAL EVERY 6 HOURS
Qty: 15 TABLET | Refills: 0 | Status: SHIPPED | OUTPATIENT
Start: 2021-11-16 | End: 2021-11-21

## 2021-11-16 RX ORDER — LIDOCAINE 50 MG/G
1 PATCH TOPICAL ONCE
Status: DISCONTINUED | OUTPATIENT
Start: 2021-11-16 | End: 2021-11-16 | Stop reason: HOSPADM

## 2021-11-16 RX ORDER — LIDOCAINE 50 MG/G
1 PATCH TOPICAL DAILY
Qty: 15 PATCH | Refills: 0 | Status: SHIPPED | OUTPATIENT
Start: 2021-11-16 | End: 2022-01-21

## 2021-11-16 RX ADMIN — KETOROLAC TROMETHAMINE 15 MG: 30 INJECTION, SOLUTION INTRAMUSCULAR; INTRAVENOUS at 10:11

## 2021-11-16 RX ADMIN — LIDOCAINE 1 PATCH: 50 PATCH TOPICAL at 09:11

## 2021-12-02 ENCOUNTER — OFFICE VISIT (OUTPATIENT)
Dept: PODIATRY | Facility: CLINIC | Age: 71
End: 2021-12-02
Payer: MEDICARE

## 2021-12-02 VITALS — HEIGHT: 63 IN | WEIGHT: 198 LBS | BODY MASS INDEX: 35.08 KG/M2

## 2021-12-02 DIAGNOSIS — M76.62 ACHILLES TENDINITIS OF LEFT LOWER EXTREMITY: ICD-10-CM

## 2021-12-02 DIAGNOSIS — M20.42 HAMMER TOES OF BOTH FEET: ICD-10-CM

## 2021-12-02 DIAGNOSIS — M20.5X2 HALLUX LIMITUS, ACQUIRED, LEFT: ICD-10-CM

## 2021-12-02 DIAGNOSIS — M20.41 HAMMER TOES OF BOTH FEET: ICD-10-CM

## 2021-12-02 DIAGNOSIS — M79.671 FOOT PAIN, BILATERAL: ICD-10-CM

## 2021-12-02 DIAGNOSIS — G89.29 CHRONIC PAIN OF LEFT KNEE: ICD-10-CM

## 2021-12-02 DIAGNOSIS — M25.562 CHRONIC PAIN OF LEFT KNEE: ICD-10-CM

## 2021-12-02 DIAGNOSIS — E11.49 TYPE II DIABETES MELLITUS WITH NEUROLOGICAL MANIFESTATIONS: Primary | ICD-10-CM

## 2021-12-02 DIAGNOSIS — M79.672 FOOT PAIN, BILATERAL: ICD-10-CM

## 2021-12-02 DIAGNOSIS — L84 CORN OR CALLUS: ICD-10-CM

## 2021-12-02 DIAGNOSIS — M72.2 PLANTAR FASCIITIS: ICD-10-CM

## 2021-12-02 DIAGNOSIS — M20.5X1 HALLUX LIMITUS, ACQUIRED, RIGHT: ICD-10-CM

## 2021-12-02 PROCEDURE — 99499 RISK ADDL DX/OHS AUDIT: ICD-10-PCS | Mod: S$GLB,,, | Performed by: PODIATRIST

## 2021-12-02 PROCEDURE — 20550 NJX 1 TENDON SHEATH/LIGAMENT: CPT | Mod: LT,S$GLB,, | Performed by: PODIATRIST

## 2021-12-02 PROCEDURE — 20550 PR INJECT TENDON SHEATH/LIGAMENT: ICD-10-PCS | Mod: LT,S$GLB,, | Performed by: PODIATRIST

## 2021-12-02 PROCEDURE — 99499 UNLISTED E&M SERVICE: CPT | Mod: S$GLB,,, | Performed by: PODIATRIST

## 2021-12-02 PROCEDURE — 99214 OFFICE O/P EST MOD 30 MIN: CPT | Mod: 25,S$GLB,, | Performed by: PODIATRIST

## 2021-12-02 PROCEDURE — 99999 PR PBB SHADOW E&M-EST. PATIENT-LVL IV: ICD-10-PCS | Mod: PBBFAC,,, | Performed by: PODIATRIST

## 2021-12-02 PROCEDURE — 99999 PR PBB SHADOW E&M-EST. PATIENT-LVL IV: CPT | Mod: PBBFAC,,, | Performed by: PODIATRIST

## 2021-12-02 PROCEDURE — 99214 PR OFFICE/OUTPT VISIT, EST, LEVL IV, 30-39 MIN: ICD-10-PCS | Mod: 25,S$GLB,, | Performed by: PODIATRIST

## 2021-12-02 RX ORDER — TRIAMCINOLONE ACETONIDE 40 MG/ML
20 INJECTION, SUSPENSION INTRA-ARTICULAR; INTRAMUSCULAR ONCE
Status: COMPLETED | OUTPATIENT
Start: 2021-12-02 | End: 2021-12-02

## 2021-12-02 RX ORDER — LIDOCAINE HYDROCHLORIDE 20 MG/ML
1 INJECTION, SOLUTION EPIDURAL; INFILTRATION; INTRACAUDAL; PERINEURAL ONCE
Status: COMPLETED | OUTPATIENT
Start: 2021-12-02 | End: 2021-12-02

## 2021-12-02 RX ORDER — BUPIVACAINE HYDROCHLORIDE 5 MG/ML
1 INJECTION, SOLUTION EPIDURAL; INTRACAUDAL ONCE
Status: COMPLETED | OUTPATIENT
Start: 2021-12-02 | End: 2021-12-02

## 2021-12-02 RX ORDER — DEXAMETHASONE SODIUM PHOSPHATE 4 MG/ML
2 INJECTION, SOLUTION INTRA-ARTICULAR; INTRALESIONAL; INTRAMUSCULAR; INTRAVENOUS; SOFT TISSUE ONCE
Status: COMPLETED | OUTPATIENT
Start: 2021-12-02 | End: 2021-12-02

## 2021-12-02 RX ADMIN — TRIAMCINOLONE ACETONIDE 20 MG: 40 INJECTION, SUSPENSION INTRA-ARTICULAR; INTRAMUSCULAR at 11:12

## 2021-12-02 RX ADMIN — DEXAMETHASONE SODIUM PHOSPHATE 2 MG: 4 INJECTION, SOLUTION INTRA-ARTICULAR; INTRALESIONAL; INTRAMUSCULAR; INTRAVENOUS; SOFT TISSUE at 11:12

## 2021-12-02 RX ADMIN — BUPIVACAINE HYDROCHLORIDE 5 MG: 5 INJECTION, SOLUTION EPIDURAL; INTRACAUDAL at 11:12

## 2021-12-02 RX ADMIN — LIDOCAINE HYDROCHLORIDE 20 MG: 20 INJECTION, SOLUTION EPIDURAL; INFILTRATION; INTRACAUDAL; PERINEURAL at 11:12

## 2021-12-09 ENCOUNTER — OFFICE VISIT (OUTPATIENT)
Dept: SPORTS MEDICINE | Facility: CLINIC | Age: 71
End: 2021-12-09
Payer: MEDICARE

## 2021-12-09 ENCOUNTER — HOSPITAL ENCOUNTER (OUTPATIENT)
Dept: RADIOLOGY | Facility: HOSPITAL | Age: 71
Discharge: HOME | End: 2021-12-09
Attending: STUDENT IN AN ORGANIZED HEALTH CARE EDUCATION/TRAINING PROGRAM
Payer: MEDICARE

## 2021-12-09 VITALS — BODY MASS INDEX: 36.86 KG/M2 | HEIGHT: 63 IN | WEIGHT: 208 LBS

## 2021-12-09 DIAGNOSIS — M17.0 BILATERAL PRIMARY OSTEOARTHRITIS OF KNEE: Primary | ICD-10-CM

## 2021-12-09 DIAGNOSIS — M25.562 CHRONIC PAIN OF LEFT KNEE: ICD-10-CM

## 2021-12-09 DIAGNOSIS — G89.29 CHRONIC PAIN OF LEFT KNEE: ICD-10-CM

## 2021-12-09 PROCEDURE — 73564 X-RAY EXAM KNEE 4 OR MORE: CPT | Mod: TC,50

## 2021-12-09 PROCEDURE — 99999 PR PBB SHADOW E&M-EST. PATIENT-LVL IV: CPT | Mod: PBBFAC,,, | Performed by: STUDENT IN AN ORGANIZED HEALTH CARE EDUCATION/TRAINING PROGRAM

## 2021-12-09 PROCEDURE — 99999 PR PBB SHADOW E&M-EST. PATIENT-LVL IV: ICD-10-PCS | Mod: PBBFAC,,, | Performed by: STUDENT IN AN ORGANIZED HEALTH CARE EDUCATION/TRAINING PROGRAM

## 2021-12-09 PROCEDURE — 99204 OFFICE O/P NEW MOD 45 MIN: CPT | Mod: S$GLB,,, | Performed by: STUDENT IN AN ORGANIZED HEALTH CARE EDUCATION/TRAINING PROGRAM

## 2021-12-09 PROCEDURE — 99204 PR OFFICE/OUTPT VISIT, NEW, LEVL IV, 45-59 MIN: ICD-10-PCS | Mod: S$GLB,,, | Performed by: STUDENT IN AN ORGANIZED HEALTH CARE EDUCATION/TRAINING PROGRAM

## 2021-12-09 PROCEDURE — 73564 XR KNEE ORTHO BILAT WITH FLEXION: ICD-10-PCS | Mod: 26,50,, | Performed by: RADIOLOGY

## 2021-12-09 PROCEDURE — 73564 X-RAY EXAM KNEE 4 OR MORE: CPT | Mod: 26,50,, | Performed by: RADIOLOGY

## 2021-12-21 ENCOUNTER — OFFICE VISIT (OUTPATIENT)
Dept: ORTHOPEDICS | Facility: CLINIC | Age: 71
End: 2021-12-21
Payer: MEDICARE

## 2021-12-21 ENCOUNTER — TELEPHONE (OUTPATIENT)
Dept: PREADMISSION TESTING | Facility: HOSPITAL | Age: 71
End: 2021-12-21
Payer: MEDICARE

## 2021-12-21 DIAGNOSIS — M17.12 PRIMARY OSTEOARTHRITIS OF LEFT KNEE: Primary | ICD-10-CM

## 2021-12-21 DIAGNOSIS — M17.0 ARTHRITIS OF BOTH KNEES: Primary | ICD-10-CM

## 2021-12-21 PROCEDURE — 99215 OFFICE O/P EST HI 40 MIN: CPT | Mod: S$GLB,,, | Performed by: ORTHOPAEDIC SURGERY

## 2021-12-21 PROCEDURE — 99215 PR OFFICE/OUTPT VISIT, EST, LEVL V, 40-54 MIN: ICD-10-PCS | Mod: S$GLB,,, | Performed by: ORTHOPAEDIC SURGERY
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
## 2022-01-11 DIAGNOSIS — Z01.818 PRE-OP TESTING: ICD-10-CM

## 2022-01-19 ENCOUNTER — TELEPHONE (OUTPATIENT)
Dept: PREADMISSION TESTING | Facility: HOSPITAL | Age: 72
End: 2022-01-19
Payer: MEDICARE

## 2022-01-19 DIAGNOSIS — M79.609 PAIN IN EXTREMITY, UNSPECIFIED EXTREMITY: ICD-10-CM

## 2022-01-19 DIAGNOSIS — E11.9 TYPE 2 DIABETES MELLITUS WITHOUT COMPLICATION, WITHOUT LONG-TERM CURRENT USE OF INSULIN: ICD-10-CM

## 2022-01-19 DIAGNOSIS — Z01.818 PRE-OP TESTING: Primary | ICD-10-CM

## 2022-01-19 NOTE — ANESTHESIA PAT ROS NOTE
01/19/2022  Abigail Pierre is a 71 y.o., female.      Pre-op Assessment          Review of Systems         Anesthesia Assessment: Preoperative EQUATION    Planned Procedure: Procedure(s) (LRB):  ARTHROPLASTY, KNEE, TOTAL: LEFT: DEPUY-SIGMA (Left)  Requested Anesthesia Type:Regional  Surgeon: Steven Kapadia III, MD  Service: Orthopedics  Known or anticipated Date of Surgery:2/14/2022    Surgeon notes: reviewed    Electronic QUestionnaire Assessment completed via nurse interview with patient.        Triage considerations:     The patient has no apparent active cardiac condition (No unstable coronary Syndrome such as severe unstable angina or recent [<1 month] myocardial infarction, decompensated CHF, severe valvular   disease or significant arrhythmia)    Previous anesthesia records:GETA   12/22/16 LUMPECTOMY-BREAST (Left Breast)   Airway/Jaw/Neck:  Airway Findings: Mouth Opening: Normal Tongue: Normal  General Airway Assessment: Adult  Mallampati: III  Improves to II with phonation.  TM Distance: Normal, at least 6 cm  Jaw/Neck Findings:  Neck ROM: Normal ROM     Direct laryngoscopy Inserted by: CRNA Airway Device: Endotracheal Tube Mask Ventilation: Easy - oral Intubated: Postinduction Blade: Garcia #2 Airway Device Size: 7.5 Style: Cuffed Cuff Inflation: Minimal occlusive pressure Inflation Amount (mL): 5 Placement Verified By: Auscultation;Capnometry Grade: Grade II Complicating Factors: Anterior larynx;Short neck;Obesity Findings Post-Intubation: Positive EtCO2;Bilateral breath sounds;Atraumatic/Condition of teeth unchanged Depth of Insertion (cm): 22 Secured at: Lips Complications: None Breath Sounds: Equal Bilateral Insertion attempts (enter comment if more than 2 attempts): 1      Last PCP note: outside [facility] , Dr. William Newman ([phone], fax [phone])  Subspecialty notes: Ortho, Podiatry  (Dr. Francisca Guerrero), Heme Onc (Dr. Milagro Dumont) [facility] ([phone], fax [phone])    Other important co-morbidities: COPD, DM2, HLD, HTN, Obesity and Ashtma, CKD3, Breat Ca S/P Left Lumpectomy (Arimidex completed 12/2021)      Tests already available:  Available tests,  within [facility] .   10/14/21 TSH, A1C (7.9), Lipid panel   8/5/21 CBC, CMP  12/16/16 EKG             Instructions given. (See in Nurse's note)    Optimization:  Anesthesia Preop Clinic Assessment  Indicated  Will schedule POC     Medical Opinion Indicated        Sub-specialist consult indicated:   [facility] NP       Plan:    Testing:  A1C, BMP, EKG, Hematology Profile and PT/INR   Pre-anesthesia  visit       Visit focus: possible regional anesthesia and/or nerve block      Consultation:PCC NP for medical and anesthesia optimization     Patient  has previously scheduled Medical Appointment:  1/21, 2/1, 2/11    Navigation: Tests Scheduled.              Consults scheduled.             Results will be tracked by Preop Clinic.     Heme Onc [facility] (Dr. Dumont) last note (8/23/21) scanned into Media on 1/20/22.    PCP [facility] (Dr. Woods) last note (1/24/22) scanned into Media on 2/10/22.           2/11/22    Patient is optimized     Patient was instructed to call and update me about any changes to health,  medication, office visits ,testing out side of the [facility] , hospitalizations between now and surgery       Ruthie Hall MD  Internal Medicine  [facility]   Cell Phone- [phone]

## 2022-01-19 NOTE — TELEPHONE ENCOUNTER
----- Message from Yolande Peterson RN sent at 1/19/2022 10:45 AM CST -----  (2/14) Please schedule EKG and Labs (A1C, PT/INR, CBC, BMP).  Thanks,  Yolande

## 2022-01-20 NOTE — ASSESSMENT & PLAN NOTE
Current BP  today.  Taking: Hyzaar 100-25 mg daily  Patient reports home BP readings of:  Encouraged keeping a healthy weight and BMI  Lifestyle changes to reduce systolic BP:  Smoking cessation; exercise 30 minutes per day,  5 days per week or 150 minutes weekly; sodium reduction and avoidance of high salt foods such as processed meats, frozen meals and  fast foods.     BP acceptable for surgery. I recommend monitoring BP during perioperative period as well as uncontrolled pain which can elevated blood pressure.

## 2022-01-20 NOTE — ASSESSMENT & PLAN NOTE
DM x   years.  Currently takes: Metformin 500 mg po q 24 hours     Most recent A1c is .  Average daily accuchecks are . No regular accuchecks.  Denies/Reports peripheral neuropathy. Reports/Denies visual changes.  Maintain healthy weight. Exercise at least 150 minutes weekly. Encouraged diet rich in nutrients such as fruits, vegetables, and whole grains; reduce sugar intake from cakes, candy, and sugared drinks.    Micro changes: Denies- Retinopathy, Nephropathy   Macro changes: Denies-  Carotid, Coronary , Peripheral disease

## 2022-01-21 ENCOUNTER — PATIENT MESSAGE (OUTPATIENT)
Dept: ADMINISTRATIVE | Facility: OTHER | Age: 72
End: 2022-01-21
Payer: MEDICARE

## 2022-01-21 ENCOUNTER — OFFICE VISIT (OUTPATIENT)
Dept: ORTHOPEDICS | Facility: CLINIC | Age: 72
End: 2022-01-21
Payer: MEDICARE

## 2022-01-21 ENCOUNTER — HOSPITAL ENCOUNTER (OUTPATIENT)
Dept: CARDIOLOGY | Facility: CLINIC | Age: 72
Discharge: HOME | End: 2022-01-21
Payer: MEDICARE

## 2022-01-21 ENCOUNTER — OFFICE VISIT (OUTPATIENT)
Dept: INTERNAL MEDICINE | Facility: CLINIC | Age: 72
End: 2022-01-21
Payer: MEDICARE

## 2022-01-21 VITALS
BODY MASS INDEX: 36.5 KG/M2 | SYSTOLIC BLOOD PRESSURE: 144 MMHG | TEMPERATURE: 98 F | DIASTOLIC BLOOD PRESSURE: 73 MMHG | HEIGHT: 63 IN | OXYGEN SATURATION: 98 % | WEIGHT: 206 LBS | HEART RATE: 117 BPM

## 2022-01-21 DIAGNOSIS — I10 PRIMARY HYPERTENSION: ICD-10-CM

## 2022-01-21 DIAGNOSIS — M17.12 PRIMARY OSTEOARTHRITIS OF LEFT KNEE: Primary | ICD-10-CM

## 2022-01-21 DIAGNOSIS — Z01.818 PRE-OP TESTING: ICD-10-CM

## 2022-01-21 PROCEDURE — 99999 PR PBB SHADOW E&M-EST. PATIENT-LVL II: ICD-10-PCS | Mod: PBBFAC,,,

## 2022-01-21 PROCEDURE — 3052F HG A1C>EQUAL 8.0%<EQUAL 9.0%: CPT | Mod: CPTII,S$GLB,, | Performed by: NURSE PRACTITIONER

## 2022-01-21 PROCEDURE — 99999 PR PBB SHADOW E&M-EST. PATIENT-LVL II: CPT | Mod: PBBFAC,,,

## 2022-01-21 PROCEDURE — 1160F RVW MEDS BY RX/DR IN RCRD: CPT | Mod: CPTII,S$GLB,, | Performed by: NURSE PRACTITIONER

## 2022-01-21 PROCEDURE — 1160F PR REVIEW ALL MEDS BY PRESCRIBER/CLIN PHARMACIST DOCUMENTED: ICD-10-PCS | Mod: CPTII,S$GLB,, | Performed by: NURSE PRACTITIONER

## 2022-01-21 PROCEDURE — 99499 UNLISTED E&M SERVICE: CPT | Mod: S$GLB,,, | Performed by: HOSPITALIST

## 2022-01-21 PROCEDURE — 99999 PR PBB SHADOW E&M-EST. PATIENT-LVL III: ICD-10-PCS | Mod: PBBFAC,,, | Performed by: NURSE PRACTITIONER

## 2022-01-21 PROCEDURE — 3078F DIAST BP <80 MM HG: CPT | Mod: CPTII,S$GLB,, | Performed by: HOSPITALIST

## 2022-01-21 PROCEDURE — 1159F MED LIST DOCD IN RCRD: CPT | Mod: CPTII,S$GLB,, | Performed by: NURSE PRACTITIONER

## 2022-01-21 PROCEDURE — 3077F SYST BP >= 140 MM HG: CPT | Mod: CPTII,S$GLB,, | Performed by: HOSPITALIST

## 2022-01-21 PROCEDURE — 99999 PR PBB SHADOW E&M-EST. PATIENT-LVL III: CPT | Mod: PBBFAC,,, | Performed by: NURSE PRACTITIONER

## 2022-01-21 PROCEDURE — 93010 ELECTROCARDIOGRAM REPORT: CPT | Mod: S$GLB,,, | Performed by: INTERNAL MEDICINE

## 2022-01-21 PROCEDURE — 99499 NO LOS: ICD-10-PCS | Mod: S$GLB,,, | Performed by: HOSPITALIST

## 2022-01-21 PROCEDURE — 93010 EKG 12-LEAD: ICD-10-PCS | Mod: S$GLB,,, | Performed by: INTERNAL MEDICINE

## 2022-01-21 PROCEDURE — 3008F BODY MASS INDEX DOCD: CPT | Mod: CPTII,S$GLB,, | Performed by: HOSPITALIST

## 2022-01-21 PROCEDURE — 99213 PR OFFICE/OUTPT VISIT, EST, LEVL III, 20-29 MIN: ICD-10-PCS | Mod: S$GLB,,, | Performed by: NURSE PRACTITIONER

## 2022-01-21 PROCEDURE — 3078F PR MOST RECENT DIASTOLIC BLOOD PRESSURE < 80 MM HG: ICD-10-PCS | Mod: CPTII,S$GLB,, | Performed by: HOSPITALIST

## 2022-01-21 PROCEDURE — 93005 ELECTROCARDIOGRAM TRACING: CPT | Mod: S$GLB,,, | Performed by: ANESTHESIOLOGY

## 2022-01-21 PROCEDURE — 3052F PR MOST RECENT HEMOGLOBIN A1C LEVEL 8.0 - < 9.0%: ICD-10-PCS | Mod: CPTII,S$GLB,, | Performed by: NURSE PRACTITIONER

## 2022-01-21 PROCEDURE — 3052F HG A1C>EQUAL 8.0%<EQUAL 9.0%: CPT | Mod: CPTII,S$GLB,, | Performed by: HOSPITALIST

## 2022-01-21 PROCEDURE — 1159F PR MEDICATION LIST DOCUMENTED IN MEDICAL RECORD: ICD-10-PCS | Mod: CPTII,S$GLB,, | Performed by: NURSE PRACTITIONER

## 2022-01-21 PROCEDURE — 3077F PR MOST RECENT SYSTOLIC BLOOD PRESSURE >= 140 MM HG: ICD-10-PCS | Mod: CPTII,S$GLB,, | Performed by: HOSPITALIST

## 2022-01-21 PROCEDURE — 3008F PR BODY MASS INDEX (BMI) DOCUMENTED: ICD-10-PCS | Mod: CPTII,S$GLB,, | Performed by: HOSPITALIST

## 2022-01-21 PROCEDURE — 99213 OFFICE O/P EST LOW 20 MIN: CPT | Mod: S$GLB,,, | Performed by: NURSE PRACTITIONER

## 2022-01-21 PROCEDURE — 3052F PR MOST RECENT HEMOGLOBIN A1C LEVEL 8.0 - < 9.0%: ICD-10-PCS | Mod: CPTII,S$GLB,, | Performed by: HOSPITALIST

## 2022-01-21 PROCEDURE — 93005 EKG 12-LEAD: ICD-10-PCS | Mod: S$GLB,,, | Performed by: ANESTHESIOLOGY

## 2022-01-21 RX ORDER — OXYCODONE HYDROCHLORIDE 5 MG/1
10 TABLET ORAL
Status: CANCELLED | OUTPATIENT
Start: 2022-01-21

## 2022-01-21 RX ORDER — FENTANYL CITRATE 50 UG/ML
25 INJECTION, SOLUTION INTRAMUSCULAR; INTRAVENOUS EVERY 5 MIN PRN
Status: CANCELLED | OUTPATIENT
Start: 2022-01-21

## 2022-01-21 RX ORDER — PREGABALIN 75 MG/1
75 CAPSULE ORAL NIGHTLY
Status: CANCELLED | OUTPATIENT
Start: 2022-01-21

## 2022-01-21 RX ORDER — ASPIRIN 81 MG/1
81 TABLET ORAL 2 TIMES DAILY
Status: CANCELLED | OUTPATIENT
Start: 2022-01-21

## 2022-01-21 RX ORDER — PROCHLORPERAZINE EDISYLATE 5 MG/ML
5 INJECTION INTRAMUSCULAR; INTRAVENOUS EVERY 6 HOURS PRN
Status: CANCELLED | OUTPATIENT
Start: 2022-01-21

## 2022-01-21 RX ORDER — TALC
6 POWDER (GRAM) TOPICAL NIGHTLY PRN
Status: CANCELLED | OUTPATIENT
Start: 2022-01-21

## 2022-01-21 RX ORDER — MIDAZOLAM HYDROCHLORIDE 1 MG/ML
4 INJECTION INTRAMUSCULAR; INTRAVENOUS
Status: CANCELLED | OUTPATIENT
Start: 2022-01-21 | End: 2022-01-22

## 2022-01-21 RX ORDER — MUPIROCIN 20 MG/G
1 OINTMENT TOPICAL
Status: CANCELLED | OUTPATIENT
Start: 2022-01-21

## 2022-01-21 RX ORDER — POLYETHYLENE GLYCOL 3350 17 G/17G
17 POWDER, FOR SOLUTION ORAL DAILY
Status: CANCELLED | OUTPATIENT
Start: 2022-01-22

## 2022-01-21 RX ORDER — OXYCODONE HYDROCHLORIDE 5 MG/1
5 TABLET ORAL
Status: CANCELLED | OUTPATIENT
Start: 2022-01-21

## 2022-01-21 RX ORDER — SODIUM CHLORIDE 0.9 % (FLUSH) 0.9 %
10 SYRINGE (ML) INJECTION
Status: CANCELLED | OUTPATIENT
Start: 2022-01-21

## 2022-01-21 RX ORDER — CELECOXIB 200 MG/1
200 CAPSULE ORAL DAILY
Status: CANCELLED | OUTPATIENT
Start: 2022-01-22

## 2022-01-21 RX ORDER — MORPHINE SULFATE 2 MG/ML
2 INJECTION, SOLUTION INTRAMUSCULAR; INTRAVENOUS
Status: CANCELLED | OUTPATIENT
Start: 2022-01-21

## 2022-01-21 RX ORDER — ACETAMINOPHEN 500 MG
1000 TABLET ORAL
Status: CANCELLED | OUTPATIENT
Start: 2022-01-21

## 2022-01-21 RX ORDER — FAMOTIDINE 20 MG/1
20 TABLET, FILM COATED ORAL 2 TIMES DAILY
Status: CANCELLED | OUTPATIENT
Start: 2022-01-21

## 2022-01-21 RX ORDER — CEFAZOLIN SODIUM 2 G/50ML
2 SOLUTION INTRAVENOUS
Status: CANCELLED | OUTPATIENT
Start: 2022-01-21 | End: 2022-01-22

## 2022-01-21 RX ORDER — SODIUM CHLORIDE 9 MG/ML
INJECTION, SOLUTION INTRAVENOUS
Status: CANCELLED | OUTPATIENT
Start: 2022-01-21

## 2022-01-21 RX ORDER — LIDOCAINE HYDROCHLORIDE 10 MG/ML
1 INJECTION, SOLUTION EPIDURAL; INFILTRATION; INTRACAUDAL; PERINEURAL
Status: CANCELLED | OUTPATIENT
Start: 2022-01-21

## 2022-01-21 RX ORDER — AMOXICILLIN 250 MG
1 CAPSULE ORAL 2 TIMES DAILY
Status: CANCELLED | OUTPATIENT
Start: 2022-01-21

## 2022-01-21 RX ORDER — PREGABALIN 75 MG/1
75 CAPSULE ORAL
Status: CANCELLED | OUTPATIENT
Start: 2022-01-21

## 2022-01-21 RX ORDER — BISACODYL 10 MG
10 SUPPOSITORY, RECTAL RECTAL EVERY 12 HOURS PRN
Status: CANCELLED | OUTPATIENT
Start: 2022-01-21

## 2022-01-21 RX ORDER — SODIUM CHLORIDE 9 MG/ML
INJECTION, SOLUTION INTRAVENOUS CONTINUOUS
Status: CANCELLED | OUTPATIENT
Start: 2022-01-21 | End: 2022-01-22

## 2022-01-21 RX ORDER — CEFAZOLIN SODIUM 2 G/50ML
2 SOLUTION INTRAVENOUS
Status: CANCELLED | OUTPATIENT
Start: 2022-01-21

## 2022-01-21 RX ORDER — ACETAMINOPHEN 500 MG
1000 TABLET ORAL EVERY 6 HOURS
Status: CANCELLED | OUTPATIENT
Start: 2022-01-21 | End: 2022-01-23

## 2022-01-21 RX ORDER — NALOXONE HCL 0.4 MG/ML
0.02 VIAL (ML) INJECTION
Status: CANCELLED | OUTPATIENT
Start: 2022-01-21

## 2022-01-21 RX ORDER — ROPIVACAINE HYDROCHLORIDE 2 MG/ML
8 INJECTION, SOLUTION EPIDURAL; INFILTRATION; PERINEURAL CONTINUOUS
Status: CANCELLED | OUTPATIENT
Start: 2022-01-21

## 2022-01-21 RX ORDER — CELECOXIB 200 MG/1
400 CAPSULE ORAL
Status: CANCELLED | OUTPATIENT
Start: 2022-01-21

## 2022-01-21 RX ORDER — FENTANYL CITRATE 50 UG/ML
100 INJECTION, SOLUTION INTRAMUSCULAR; INTRAVENOUS
Status: CANCELLED | OUTPATIENT
Start: 2022-01-21 | End: 2022-01-22

## 2022-01-21 RX ORDER — ONDANSETRON 2 MG/ML
4 INJECTION INTRAMUSCULAR; INTRAVENOUS EVERY 8 HOURS PRN
Status: CANCELLED | OUTPATIENT
Start: 2022-01-21

## 2022-01-21 RX ORDER — MUPIROCIN 20 MG/G
1 OINTMENT TOPICAL 2 TIMES DAILY
Status: CANCELLED | OUTPATIENT
Start: 2022-01-21 | End: 2022-01-26

## 2022-01-21 RX ORDER — METHOCARBAMOL 750 MG/1
750 TABLET, FILM COATED ORAL 3 TIMES DAILY
Status: CANCELLED | OUTPATIENT
Start: 2022-01-21

## 2022-01-21 NOTE — H&P (VIEW-ONLY)
CC:  Left knee pain    Abigail Pierre is a 71 y.o. female with history of Left knee pain. Pain is worse with activity and weight bearing.  Patient has experienced interference of activities of daily living due to decreased range of motion and an increase in joint pain and swelling.  Patient has failed non-operative treatment including NSAIDs, corticosteroid injections, viscosupplement injections, and activity modification.  Abigail Pierre currently ambulates independently.     Relevant medical conditions of significance in perioperative period:  Asthma- on medication managed by pulmonary  HTN- on medication managed by pcp  DM- on medication managed by pcp     Past Medical History:   Diagnosis Date    Arthritis     Carpal tunnel syndrome 1/2/2014    COPD (chronic obstructive pulmonary disease)     Diabetes mellitus type II, uncontrolled 1/23/2017    Diabetes mellitus, type 2     DJD (degenerative joint disease) of knee 1/2/2014    Ductal carcinoma in situ (DCIS) of left breast 12/22/2016    History of colonic polyps 1/23/2017    Around 2015    Hyperlipidemia     Hypertension     Intrinsic asthma, unspecified 1/2/2014    Kidney stones 1/23/2017    On CT done by GI in 2015 -sent to Dr Sargent?    Periumbilical abdominal pain 1/23/2017    Seen by Cristhian Mena 10/15 -ct, colon and EGD (normal EGD)       Past Surgical History:   Procedure Laterality Date    BREAST SURGERY      CHOLECYSTECTOMY      CYSTOSCOPY W/ LASER LITHOTRIPSY         Family History   Problem Relation Age of Onset    Colon cancer Father     Heart disease Mother     Hypertension Mother     Diabetes Mother     Hyperlipidemia Mother     Breast cancer Neg Hx     Ovarian cancer Neg Hx        Review of patient's allergies indicates:   Allergen Reactions    Sulfa (sulfonamide antibiotics) Itching and Rash         Current Outpatient Medications:     diclofenac sodium (VOLTAREN) 1 % Gel, Apply 2 g topically once daily., Disp: 100 g, Rfl:  3    ketoconazole (NIZORAL) 2 % cream, Apply topically once daily., Disp: 1 Tube, Rfl: 3    losartan-hydrochlorothiazide 100-25 mg (HYZAAR) 100-25 mg per tablet, Take 1 tablet by mouth once daily., Disp: 90 tablet, Rfl: 2    metformin (GLUCOPHAGE-XR) 500 MG 24 hr tablet, TAKE 2 TABLETS(1000 MG) BY MOUTH TWICE DAILY, Disp: 120 tablet, Rfl: 0    Review of Systems:  Constitutional: no fever or chills  Eyes: no visual changes  ENT: no nasal congestion or sore throat  Respiratory: no cough or shortness of breath  Cardiovascular: no chest pain or palpitations  Gastrointestinal: no nausea or vomiting, tolerating diet  Genitourinary: no hematuria or dysuria  Integument/Breast: no rash or pruritis  Hematologic/Lymphatic: no easy bruising or lymphadenopathy  Musculoskeletal: positive for knee pain  Neurological: no seizures or tremors  Behavioral/Psych: no auditory or visual hallucinations  Endocrine: no heat or cold intolerance    PE:  There were no vitals taken for this visit.  General: Pleasant, cooperative, NAD   Gait: antalgic  HEENT: NCAT, sclera nonicteric   Lungs: Respirations clear bilaterally; equal and unlabored.   CV: S1S2; 2+ bilateral upper and lower extremity pulses.   Skin: Intact throughout with no rashes, erythema, or lesions  Extremities: No LE edema,  no erythema or warmth of the skin in either lower extremity.    Left knee exam:  Knee Range of Motion:normal, pain with passive range of motion  Effusion:none  Condition of skin:intact  Location of tenderness:Medial joint line   Strength:normal  Stability: stable to testing    Hip Examination: painless PROM of hip     Radiographs: Radiographs reveal advanced degenerative changes including subchondral cyst formation, subchondral sclerosis, osteophyte formation, joint space narrowing.     Knee Alignment:  Moderate varus    Diagnosis: Primary osteoarthritis Left knee    Plan: Left total knee arthroplasty    Due to the serious nature of total joint infection  and the high prevalence of community acquired MRSA, vancomycin will be used perioperatively.

## 2022-01-21 NOTE — DISCHARGE INSTRUCTIONS
Your surgery has been scheduled for:__________________________________________    You should report to:  ____[facility], located on the [place] side of the first floor of the           [facility] ([phone])  ____[facility], located on the [facility] side of the            Second floor of the [facility] ([phone])  ____[facility], located @ [address] [place] [address] Naun Chanday BLDG. A [phone]    Please Note   - Tell your doctor if you take Aspirin, products containing Aspirin, herbal medications  or blood thinners, such as Coumadin, Ticlid, or Plavix.  (Consult your provider regarding holding or stopping before surgery).  - Arrange for someone to drive you home following surgery.  You will not be allowed to leave the surgical facility alone or drive yourself home following sedation and anesthesia.  Before Surgery  - Stop taking all vitamins/ herbal medications 14days prior to surgery  - No Motrin/Advil (Ibuprofen) 1 day before surgery  - No Aleve (Naproxen) 7 days before surgery  - Stop Taking Asprin, products containing Asprin _____days before surgery  - Stop taking blood thinners_______days before surgery  - No Goody's/BC  Powder 7 days before surgery  - Refrain from drinking alcoholic beverages for 24hours before and after surgery  - Stop or limit smoking _________days before surgery  - You may take Tylenol for pain  Night before Surgery   Stop ALL solid food, gum, candy (including vitamins) 8 hours before arrival time.  (Please note: If your surgeon gives you different eating and drinking instructions, please follow surgeon's directions.)   Stop all CLOUDY liquids: coffee with creamer, formula, tube feeds, cloudy juices, non-human milk and breast milk with additives, 6 hours prior to arrival time.   Stop plain breast milk 4 hours prior to arrival time.   The patient should be ENCOURAGED to drink carbohydrate-rich  clear liquids (sports drinks, clear juices) until 2 hours prior to arrival time.   CLEAR liquids include only water, black coffee NO creamer, clear oral rehydration drinks, clear sports drinks or clear fruit juices (no orange juice, no pulpy juices, no apple cider). Advise patients if they can read newsprint through the liquid, it qualifies as clear liquid.    IF IN DOUBT, drink water instead.   - Take a shower or bath (shower is recommended).  Bathe with Hibiclens soap or an antibacterial soap from the neck down.  If not supplied by your surgeon, hibiclens soap will need to be purchased over the counter in pharmacy.  Rinse soap off thoroughly.  - Shampoo your hair with your regular shampoo  The Day of Surgery  · NOTHING TO  DRINK 2 hours before arrival time. If you are told to take medication on the morning of surgery, it may be taken with a sip of water.   - Take another bath or shower with hibiclens or any antibacterial soap, to reduce the chance of infection.  - Take heart and blood pressure medications with a small sip of water, as advised by the perioperative team.  - Do not take fluid pills  - You may brush your teeth and rinse your mouth, but do not swall any additional water.   - Do not apply perfumes, powder, body lotions or deodorant on the day of surgery.  - Nail polish should be removed.  - Do not wear makeup or moisturizer  - Wear comfortable clothes, such as a button front shirt and loose fitting pants.  - Leave all jewelry, including body piercings, and valuables at home.    - Bring any devices you will neeed after surgery such as crutches or canes.  - If you have sleep apnea, please bring your CPAP machine  In the event that your physical condition changes including the onset of a cold or respiratory illness, or if you have to delay or cancel your surgery, please notify your surgeon.    Anesthesia: Regional Anesthesia    Youre scheduled for surgery. During surgery, youll receive medicine called  anesthesia to keep you comfortable and pain-free. Your surgeon has decided that youll receive regional anesthesia. This sheet tells you what to expect with this type of anesthesia.  What is regional anesthesia?  Regional anesthesia numbs one region of your body. The anesthesia may be given around nerves or into veins in your arms, neck, or legs (nerve block or Amanda block). Or it may be sent into the spinal fluid (spinal anesthesia) or into the space just outside the spinal fluid (epidural anesthesia). You may also be given sedatives to help you relax.  Nerve block or Matador block  A small area of the body, such as an arm or leg, can be numbed using a nerve block or Amanda block.  · Nerve block. During a nerve block, your skin is numbed. A needle is then inserted near nerves that serve the area to be numbed. Anesthetic is sent through the needle.  · IV regional or Matador block. For this type of block, an IV line is put into a vein. The blood flow to the area to be numbed is blocked for a short time. Anesthetic is sent through the IV.  Spinal anesthesia  Spinal anesthesia numbs your body from about the waist down.  · Anesthetic is injected into the spinal fluid. This is a substance that surrounds the spinal cord in your spinal column. The anesthetic blocks pain traveling from the body to the brain.  · To receive the anesthetic, your skin is numbed at the injection site on your back.  · A needle is then inserted into the spinal space. Anesthetic is sent into the spinal fluid through the needle.  Epidural anesthesia  Epidural anesthesia is most commonly used during childbirth and may also be used after surgical procedures of the chest, belly, and legs.  · Anesthetic is injected into the epidural space. This is just outside the dural sac which contains the spinal fluid.  · To receive the anesthetic, your skin is numbed at the injection site on your back.  · A needle is then inserted into the epidural space. Anesthetic is sent  into the epidural space through the needle.  · A small flexible catheter may be attached to the needle and left in place. This allows for continuous injections or infusions of anesthetic.  Anesthesia tools and medicines that might be near you during your procedure  · Local anesthetic. This medicine is given through a needle numbs one region of your body.  · Electrocardiography leads (electrodes). These are used to record your heart rate and rhythm.  · Blood pressure cuff. A cuff is placed on your arm to keep track of your blood pressure.  · Pulse oximeter. This small clip is placed on the end of the finger. It measures your blood oxygen level.  · Sedatives. These medicines may be given through an IV. They help to relax you and keep you comfortable. You may stay awake or sleep lightly.  · Oxygen. You may be given oxygen through a facemask.  Risks and possible complications  Regional anesthesia carries some risks. These include:  · Nausea and vomiting  · Headache  · Backache  · Decreased blood pressure  · Allergic reaction to the anesthetic  · Ongoing numbness (rare)  · Irregular heartbeat (rare)  · Cardiac arrest (rare)   Date Last Reviewed: 12/1/2016  © 1942-1610 [employer]. [address] [place] [address]. All rights reserved. This information is not intended as a substitute for professional medical care. Always follow your healthcare professional's instructions.

## 2022-01-21 NOTE — PROGRESS NOTES
Abigail Pierre is a 71 y.o. year old here today for pre surgery optimization visit  in preparation for a Left total knee arthroplasty to be performed by Dr. Kapadia  on 2/14/2022.  she was last seen and treated in the clinic on 12/21/2021. she will be medically optimized by the pre op center. There has been no significant change in medical status since last visit. No fever, chills, malaise, or unexplained weight change.      Allergies, Medications, past medical and surgical history reviewed.    Focused examination performed.    Patient declined to see surgeon today. All questions answered. Patient encouraged to call with questions. Contact information given.     Pre, jacquelyn, and post operative procedures and expectations discussed. Goals of successful surgery reviewed and include manageable pain levels, surgical site free of infection, medication management, and ambulation with PT/OT assistance. Healthy weight management discussed with patient and caregiver who were receptive to eduction of healthy diet and activity. No other necessary lifestyle changes identified. Educated patient about signs and symptoms of infection, medication management, anticoagulation therapy, risk of tobacco and alcohol use, and self-care to promote healing. Surgical guide given for future reference. Hibiclens given to patient with instructions. All questions were answered.     Abigail Pierre verbalized an understanding to the education and goals. Patient has displayed readiness to engage in care and is ready to proceed with surgery.  Patient reports her daughter is able and ready to provide assistance at home after discharge.    Surgical and blood consents signed.    Abigail Pierre will contact us if there are any questions, concerns, or changes in medical status prior to surgery.       Joint class: 2/1    COVID-19 test date: vaccinated     Patient has discussed discharge planning with surgeon. Patient will be discharged to home following  surgery.   patient will be scheduled with [facility]. She is scheduled at the [place] location.    20 minutes of time was spent on patient education, review of records, templating, H&P, , appointment scheduling and optimizing patient for surgery.

## 2022-01-21 NOTE — PROGRESS NOTES
"Reza Almeida [address]  Progress Note    Patient arrived to appointment today reporting she had an "asthma attack" this morning.  Pulse 98% at this visit, RR 20, /73, P 117, T 98.3.   Patient was questioned about her current symptoms she was experiencing in which she reported she had headache, sinus congestion, nasal drip and upper airway/asthma symptoms.  She reported her symptoms began 3 days ago I asked if she had had Covid in the past in which she stated "no" and "ma'am, I don't have no Covid".  I asked her if she had been tested in which she replied "Yes 3 weeks ago".   I asked her to consider going to get Covid tested- which she declined. After being asked about taking a Covid test the patient began to deny that she had a respiratory infection and the symptoms she previously reported and stated "Ma'am I have asthma".   I asked her to consider to go to the ER for her current respiratory symptoms she is experiencing at which point she  picked up her coat and purse and walked out of her appointment. [facility] notified of events.    David James NP  Perioperative Medicine  [facility]   Pager [phone]    "

## 2022-01-21 NOTE — H&P
CC:  Left knee pain    Abigail Pierre is a 71 y.o. female with history of Left knee pain. Pain is worse with activity and weight bearing.  Patient has experienced interference of activities of daily living due to decreased range of motion and an increase in joint pain and swelling.  Patient has failed non-operative treatment including NSAIDs, corticosteroid injections, viscosupplement injections, and activity modification.  Abigail Pierre currently ambulates independently.     Relevant medical conditions of significance in perioperative period:  Asthma- on medication managed by pulmonary  HTN- on medication managed by pcp  DM- on medication managed by pcp     Past Medical History:   Diagnosis Date    Arthritis     Carpal tunnel syndrome 1/2/2014    COPD (chronic obstructive pulmonary disease)     Diabetes mellitus type II, uncontrolled 1/23/2017    Diabetes mellitus, type 2     DJD (degenerative joint disease) of knee 1/2/2014    Ductal carcinoma in situ (DCIS) of left breast 12/22/2016    History of colonic polyps 1/23/2017    Around 2015    Hyperlipidemia     Hypertension     Intrinsic asthma, unspecified 1/2/2014    Kidney stones 1/23/2017    On CT done by GI in 2015 -sent to Dr Sargent?    Periumbilical abdominal pain 1/23/2017    Seen by Cristhian Mena 10/15 -ct, colon and EGD (normal EGD)       Past Surgical History:   Procedure Laterality Date    BREAST SURGERY      CHOLECYSTECTOMY      CYSTOSCOPY W/ LASER LITHOTRIPSY         Family History   Problem Relation Age of Onset    Colon cancer Father     Heart disease Mother     Hypertension Mother     Diabetes Mother     Hyperlipidemia Mother     Breast cancer Neg Hx     Ovarian cancer Neg Hx        Review of patient's allergies indicates:   Allergen Reactions    Sulfa (sulfonamide antibiotics) Itching and Rash         Current Outpatient Medications:     diclofenac sodium (VOLTAREN) 1 % Gel, Apply 2 g topically once daily., Disp: 100 g, Rfl:  3    ketoconazole (NIZORAL) 2 % cream, Apply topically once daily., Disp: 1 Tube, Rfl: 3    losartan-hydrochlorothiazide 100-25 mg (HYZAAR) 100-25 mg per tablet, Take 1 tablet by mouth once daily., Disp: 90 tablet, Rfl: 2    metformin (GLUCOPHAGE-XR) 500 MG 24 hr tablet, TAKE 2 TABLETS(1000 MG) BY MOUTH TWICE DAILY, Disp: 120 tablet, Rfl: 0    Review of Systems:  Constitutional: no fever or chills  Eyes: no visual changes  ENT: no nasal congestion or sore throat  Respiratory: no cough or shortness of breath  Cardiovascular: no chest pain or palpitations  Gastrointestinal: no nausea or vomiting, tolerating diet  Genitourinary: no hematuria or dysuria  Integument/Breast: no rash or pruritis  Hematologic/Lymphatic: no easy bruising or lymphadenopathy  Musculoskeletal: positive for knee pain  Neurological: no seizures or tremors  Behavioral/Psych: no auditory or visual hallucinations  Endocrine: no heat or cold intolerance    PE:  There were no vitals taken for this visit.  General: Pleasant, cooperative, NAD   Gait: antalgic  HEENT: NCAT, sclera nonicteric   Lungs: Respirations clear bilaterally; equal and unlabored.   CV: S1S2; 2+ bilateral upper and lower extremity pulses.   Skin: Intact throughout with no rashes, erythema, or lesions  Extremities: No LE edema,  no erythema or warmth of the skin in either lower extremity.    Left knee exam:  Knee Range of Motion:normal, pain with passive range of motion  Effusion:none  Condition of skin:intact  Location of tenderness:Medial joint line   Strength:normal  Stability: stable to testing    Hip Examination: painless PROM of hip     Radiographs: Radiographs reveal advanced degenerative changes including subchondral cyst formation, subchondral sclerosis, osteophyte formation, joint space narrowing.     Knee Alignment:  Moderate varus    Diagnosis: Primary osteoarthritis Left knee    Plan: Left total knee arthroplasty    Due to the serious nature of total joint infection  and the high prevalence of community acquired MRSA, vancomycin will be used perioperatively.             Statement Selected

## 2022-01-21 NOTE — Clinical Note
Good morning, Ms. Pierre came to the pre op clinic for optimization for surgery scheduled 2/14/22.  She reported she had an asthma attack this morning and has had a respiratory infection for 3 days.  I asked her to get a Covid test which she declined.  I asked her to go to the ER and she declined.  She has gone to the lab and gotten her EKG  I am concerned she may have Covid or another respiratory infection.  If you have any influence on her health care decisions I would appreciate you check ing on her to encourage she seek care for her respiratory infection/asthma exacerbation.  Thank you, David James

## 2022-01-22 ENCOUNTER — HOSPITAL ENCOUNTER (EMERGENCY)
Facility: HOSPITAL | Age: 72
Discharge: HOME | End: 2022-01-22
Attending: EMERGENCY MEDICINE
Payer: MEDICARE

## 2022-01-22 VITALS
RESPIRATION RATE: 15 BRPM | OXYGEN SATURATION: 100 % | WEIGHT: 198 LBS | BODY MASS INDEX: 35.07 KG/M2 | TEMPERATURE: 98 F | SYSTOLIC BLOOD PRESSURE: 138 MMHG | DIASTOLIC BLOOD PRESSURE: 93 MMHG | HEART RATE: 99 BPM

## 2022-01-22 DIAGNOSIS — J45.909 ASTHMA: ICD-10-CM

## 2022-01-22 DIAGNOSIS — J45.901 EXACERBATION OF ASTHMA, UNSPECIFIED ASTHMA SEVERITY, UNSPECIFIED WHETHER PERSISTENT: Primary | ICD-10-CM

## 2022-01-22 LAB
ALBUMIN SERPL-MCNC: 3.8 G/DL (ref 3.3–5.5)
ALP SERPL-CCNC: 97 U/L (ref 42–141)
BILIRUB SERPL-MCNC: 0.7 MG/DL (ref 0.2–1.6)
BUN SERPL-MCNC: 12 MG/DL (ref 7–22)
CALCIUM SERPL-MCNC: 10.6 MG/DL (ref 8–10.3)
CHLORIDE SERPL-SCNC: 104 MMOL/L (ref 98–108)
CREAT SERPL-MCNC: 1 MG/DL (ref 0.6–1.2)
CTP QC/QA: YES
GLUCOSE SERPL-MCNC: 152 MG/DL (ref 73–118)
GLUCOSE SERPL-MCNC: 156 MG/DL (ref 70–110)
POC ALT (SGPT): 28 U/L (ref 10–47)
POC AST (SGOT): 29 U/L (ref 11–38)
POC B-TYPE NATRIURETIC PEPTIDE: 81.2 PG/ML (ref 0–100)
POC CARDIAC TROPONIN I: 0 NG/ML
POC TCO2: 29 MMOL/L (ref 18–33)
POTASSIUM BLD-SCNC: 3.9 MMOL/L (ref 3.6–5.1)
PROTEIN, POC: 7.4 G/DL (ref 6.4–8.1)
SAMPLE: NORMAL
SARS-COV-2 RDRP RESP QL NAA+PROBE: NEGATIVE
SODIUM BLD-SCNC: 143 MMOL/L (ref 128–145)

## 2022-01-22 PROCEDURE — 93005 ELECTROCARDIOGRAM TRACING: CPT | Mod: ER

## 2022-01-22 PROCEDURE — 96374 THER/PROPH/DIAG INJ IV PUSH: CPT | Mod: ER

## 2022-01-22 PROCEDURE — 83880 ASSAY OF NATRIURETIC PEPTIDE: CPT | Mod: ER

## 2022-01-22 PROCEDURE — 99285 EMERGENCY DEPT VISIT HI MDM: CPT | Mod: 25,ER

## 2022-01-22 PROCEDURE — 85025 COMPLETE CBC W/AUTO DIFF WBC: CPT | Mod: ER

## 2022-01-22 PROCEDURE — 93010 ELECTROCARDIOGRAM REPORT: CPT | Mod: ,,, | Performed by: INTERNAL MEDICINE

## 2022-01-22 PROCEDURE — 94640 AIRWAY INHALATION TREATMENT: CPT | Mod: ER

## 2022-01-22 PROCEDURE — 84484 ASSAY OF TROPONIN QUANT: CPT | Mod: ER

## 2022-01-22 PROCEDURE — 80053 COMPREHEN METABOLIC PANEL: CPT | Mod: ER

## 2022-01-22 PROCEDURE — 93010 EKG 12-LEAD: ICD-10-PCS | Mod: ,,, | Performed by: INTERNAL MEDICINE

## 2022-01-22 PROCEDURE — 82962 GLUCOSE BLOOD TEST: CPT | Mod: ER

## 2022-01-22 PROCEDURE — 81003 URINALYSIS AUTO W/O SCOPE: CPT | Mod: ER

## 2022-01-22 PROCEDURE — 63600175 PHARM REV CODE 636 W HCPCS: Mod: ER | Performed by: EMERGENCY MEDICINE

## 2022-01-22 PROCEDURE — U0002 COVID-19 LAB TEST NON-CDC: HCPCS | Mod: ER | Performed by: EMERGENCY MEDICINE

## 2022-01-22 PROCEDURE — 25000242 PHARM REV CODE 250 ALT 637 W/ HCPCS: Mod: ER | Performed by: EMERGENCY MEDICINE

## 2022-01-22 RX ORDER — ALBUTEROL SULFATE 90 UG/1
2 AEROSOL, METERED RESPIRATORY (INHALATION)
Status: COMPLETED | OUTPATIENT
Start: 2022-01-22 | End: 2022-01-22

## 2022-01-22 RX ORDER — METHYLPREDNISOLONE SOD SUCC 125 MG
125 VIAL (EA) INJECTION
Status: COMPLETED | OUTPATIENT
Start: 2022-01-22 | End: 2022-01-22

## 2022-01-22 RX ORDER — ALBUTEROL SULFATE 90 UG/1
2 AEROSOL, METERED RESPIRATORY (INHALATION) EVERY 6 HOURS PRN
Qty: 20.1 G | Refills: 11 | Status: SHIPPED | OUTPATIENT
Start: 2022-01-22 | End: 2023-12-14

## 2022-01-22 RX ORDER — LORATADINE 10 MG/1
10 TABLET ORAL DAILY
Qty: 60 TABLET | Refills: 0 | Status: SHIPPED | OUTPATIENT
Start: 2022-01-22 | End: 2023-12-14

## 2022-01-22 RX ORDER — PREDNISONE 20 MG/1
40 TABLET ORAL DAILY
Qty: 10 TABLET | Refills: 0 | Status: SHIPPED | OUTPATIENT
Start: 2022-01-22 | End: 2022-01-27

## 2022-01-22 RX ORDER — ALBUTEROL SULFATE 2.5 MG/.5ML
2.5 SOLUTION RESPIRATORY (INHALATION) EVERY 4 HOURS PRN
Qty: 30 EACH | Refills: 0 | Status: SHIPPED | OUTPATIENT
Start: 2022-01-22 | End: 2023-01-22

## 2022-01-22 RX ORDER — FLUTICASONE PROPIONATE 50 MCG
1 SPRAY, SUSPENSION (ML) NASAL 2 TIMES DAILY
Qty: 16 G | Refills: 0 | Status: SHIPPED | OUTPATIENT
Start: 2022-01-22 | End: 2023-01-07 | Stop reason: SDUPTHER

## 2022-01-22 RX ORDER — IPRATROPIUM BROMIDE AND ALBUTEROL SULFATE 2.5; .5 MG/3ML; MG/3ML
3 SOLUTION RESPIRATORY (INHALATION)
Status: COMPLETED | OUTPATIENT
Start: 2022-01-22 | End: 2022-01-22

## 2022-01-22 RX ADMIN — METHYLPREDNISOLONE SODIUM SUCCINATE 125 MG: 125 INJECTION, POWDER, FOR SOLUTION INTRAMUSCULAR; INTRAVENOUS at 11:01

## 2022-01-22 RX ADMIN — ALBUTEROL SULFATE 2 PUFF: 108 INHALANT RESPIRATORY (INHALATION) at 10:01

## 2022-01-22 RX ADMIN — IPRATROPIUM BROMIDE AND ALBUTEROL SULFATE 3 ML: .5; 3 SOLUTION RESPIRATORY (INHALATION) at 11:01

## 2022-01-22 NOTE — ED PROVIDER NOTES
"Encounter Date: 1/22/2022    SCRIBE #1 NOTE: I, Nora Starkey, am scribing for, and in the presence of,  Lynette Templeton DO. I have scribed the following portions of the note - Other sections scribed: HPI, ROS, PE.       History     Chief Complaint   Patient presents with    Shortness of Breath     REPORTS COUGH, CHEST AND NASAL CONGESTION X 2 DAYS; STATES SHE HAS BEEN DEALING WITH HER "ASTHMA AND CHEST WEASLING FOR TWO DAYS"; DENIES FEVER     Abigail Pierre is a 71 y.o. female with Hx of COPD, DM type 2, HTN, and Asthma who presents to the ED for chief complaint of acute chest discomfort with wheezing and congestion for 2 days. Has taken Tylenol, Claritin, and a nebulizer treatment for symptoms. Most recent nebulizer treatment last night. Notes having EKG done yesterday to prepare for knee surgery in February. Vaccinated against COVID-19. Denies fever, chills, nausea, vomiting, or diarrhea. No other complaints at this time.      The history is provided by the patient. No  was used.     Review of patient's allergies indicates:   Allergen Reactions    Sulfa (sulfonamide antibiotics) Itching and Rash     Past Medical History:   Diagnosis Date    Arthritis     Carpal tunnel syndrome 1/2/2014    COPD (chronic obstructive pulmonary disease)     Diabetes mellitus type II, uncontrolled 1/23/2017    Diabetes mellitus, type 2     DJD (degenerative joint disease) of knee 1/2/2014    Ductal carcinoma in situ (DCIS) of left breast 12/22/2016    History of colonic polyps 1/23/2017    Around 2015    Hyperlipidemia     Hypertension     Intrinsic asthma, unspecified 1/2/2014    Kidney stones 1/23/2017    On CT done by GI in 2015 -sent to Dr Sargent?    Periumbilical abdominal pain 1/23/2017    Seen by Cristhian Mena 10/15 -ct, colon and EGD (normal EGD)     Past Surgical History:   Procedure Laterality Date    BREAST SURGERY      CHOLECYSTECTOMY      CYSTOSCOPY W/ LASER LITHOTRIPSY       Family History "   Problem Relation Age of Onset    Colon cancer Father     Heart disease Mother     Hypertension Mother     Diabetes Mother     Hyperlipidemia Mother     Breast cancer Neg Hx     Ovarian cancer Neg Hx      Social History     Tobacco Use    Smoking status: Never Smoker    Smokeless tobacco: Never Used   Substance Use Topics    Alcohol use: No    Drug use: No     Review of Systems   Constitutional: Negative for chills and fever.   HENT: Positive for congestion. Negative for rhinorrhea and sore throat.    Eyes: Negative for redness.   Respiratory: Positive for wheezing. Negative for shortness of breath.    Cardiovascular: Positive for chest pain (discomfort). Negative for leg swelling.   Gastrointestinal: Negative for abdominal pain, diarrhea, nausea and vomiting.   Musculoskeletal: Negative for back pain.   Skin: Negative for rash.   Neurological: Negative for syncope and headaches.   All other systems reviewed and are negative.      Physical Exam     Initial Vitals [01/22/22 1016]   BP Pulse Resp Temp SpO2   (!) 171/130 (!) 118 18 98.2 °F (36.8 °C) 96 %      MAP       --         Patient gave consent to have physical exam performed.  Physical Exam    Nursing note and vitals reviewed.  Constitutional: She appears well-developed and well-nourished. She is Obese . She appears distressed.   HENT:   Head: Normocephalic and atraumatic.   Right Ear: External ear normal.   Left Ear: External ear normal.   Nose: Nose normal.   Mouth/Throat: Oropharynx is clear and moist.   Eyes: Conjunctivae and EOM are normal. Pupils are equal, round, and reactive to light.   Neck: Phonation normal. Neck supple.   Normal range of motion.  Cardiovascular: Regular rhythm, normal heart sounds and intact distal pulses. Tachycardia present.  Exam reveals no gallop and no friction rub.    No murmur heard.  Pulmonary/Chest: No stridor. Tachypnea noted. She is in respiratory distress. She has decreased breath sounds (Diminished in  bilateral bases). She has wheezes (inspiratory and expiratory). She has no rhonchi. She has no rales. She exhibits no tenderness.   Abdominal: Abdomen is soft. Bowel sounds are normal. She exhibits no distension. There is no abdominal tenderness. There is no rigidity, no rebound and no guarding.   Musculoskeletal:         General: No tenderness or edema. Normal range of motion.      Cervical back: Normal range of motion and neck supple.     Neurological: She is alert and oriented to person, place, and time. She has normal strength. No cranial nerve deficit or sensory deficit. GCS score is 15. GCS eye subscore is 4. GCS verbal subscore is 5. GCS motor subscore is 6.   Skin: Skin is warm and dry. Capillary refill takes less than 2 seconds. No rash noted.   Psychiatric: She has a normal mood and affect. Her behavior is normal.         ED Course   Procedures  Labs Reviewed   POCT GLUCOSE, HAND-HELD DEVICE - Abnormal; Notable for the following components:       Result Value    POC Glucose 156 (*)     All other components within normal limits   POCT CMP - Abnormal; Notable for the following components:    Calcium, POC 10.6 (*)     POC Glucose 152 (*)     All other components within normal limits   TROPONIN ISTAT   POCT CBC   SARS-COV-2 RDRP GENE    Narrative:     This test utilizes isothermal nucleic acid amplification   technology to detect the SARS-CoV-2 RdRp nucleic acid segment.   The analytical sensitivity (limit of detection) is 125 genome   equivalents/mL.   A POSITIVE result implies infection with the SARS-CoV-2 virus;   the patient is presumed to be contagious.     A NEGATIVE result means that SARS-CoV-2 nucleic acids are not   present above the limit of detection. A NEGATIVE result should be   treated as presumptive. It does not rule out the possibility of   COVID-19 and should not be the sole basis for treatment decisions.   If COVID-19 is strongly suspected based on clinical and exposure   history, re-testing  using an alternate molecular assay should be   considered.   This test is only for use under the Food and Drug   Administration s Emergency Use Authorization (EUA).   Commercial kits are provided by [employer].   Performance characteristics of the EUA have been independently   verified by [facility] Department of   Pathology and Laboratory Medicine.   _________________________________________________________________   The authorized Fact Sheet for Healthcare Providers and the authorized Fact   Sheet for Patients of the ID NOW COVID-19 are available on the FDA   website:     https://www.fda.gov/media/287747/download  https://www.fda.gov/media/543945/download       POCT CMP   POCT B-TYPE NATRIURETIC PEPTIDE (BNP)   POCT TROPONIN   POCT B-TYPE NATRIURETIC PEPTIDE (BNP)             EKG Readings: (Independently Interpreted)   No STEMI. Rate of 99. Normal Sinus Rhythm. Normal Axis. Abnormal EKG. QTc normal at 451. When compared to prior EKG dated 1/21/22 rate decreased by 10 bpm.      ECG Results          EKG 12-lead (Final result)  Result time 01/23/22 13:52:47    Final result by Interface, Lab In [facility] (01/23/22 13:52:47)                 Narrative:    Test Reason : J45.909,    Vent. Rate : 099 BPM     Atrial Rate : 099 BPM     P-R Int : 146 ms          QRS Dur : 078 ms      QT Int : 352 ms       P-R-T Axes : 050 042 046 degrees     QTc Int : 451 ms    Normal sinus rhythm  Possible Left atrial enlargement  Borderline Abnormal ECG  When compared with ECG of 21-JAN-2022 11:20,  No significant change was found  Confirmed by Zachary Chu MD ([id number]) on 1/23/2022 1:52:35 PM    Referred By: [email or web address]   SELF           Confirmed By:Zachary Chu MD                            Imaging Results          X-Ray Chest AP Portable (Final result)  Result time 01/22/22 11:37:00    Final result by Alyssa Urbano MD (01/22/22 11:37:00)                 Impression:      No acute abnormality.      Electronically signed  by: Alyssa Urbano MD  Date:    01/22/2022  Time:    11:37             Narrative:    EXAMINATION:  XR CHEST AP PORTABLE    CLINICAL HISTORY:  Asthma;    TECHNIQUE:  Single frontal view of the chest was performed.    COMPARISON:  None    FINDINGS:  The lungs are clear, with normal appearance of pulmonary vasculature and no large pleural effusion or pneumothorax.    The cardiac silhouette is normal in size. The hilar and mediastinal contours are unremarkable.    Bones are intact.                                 Medications   methylPREDNISolone sodium succinate injection 125 mg (125 mg Intravenous Given 1/22/22 1109)   albuterol-ipratropium 2.5 mg-0.5 mg/3 mL nebulizer solution 3 mL (3 mLs Nebulization Given 1/22/22 1103)   albuterol inhaler 2 puff (2 puffs Inhalation Given 1/22/22 1041)     Medical Decision Making:   History:   Old Medical Records: I decided to obtain old medical records.  Independently Interpreted Test(s):   I have ordered and independently interpreted EKG Reading(s) - see prior notes  Clinical Tests:   Lab Tests: Reviewed and Ordered  Radiological Study: Ordered and Reviewed  Medical Tests: Reviewed and Ordered  Chief complaint: Acute chest discomfort with wheezing and congestion   Differential diagnosis: Hyperglycemia, Asthma, Asthma exacerbation, COVID-19 infection, Pneumonia, NSTEMI, STEMI, Cardiac arrhythmia.    Treatment in the ED: PE, labs, X-ray, EKG, Albuterol inhaler, methylprednisolone sodium succinate injection, albuterol-ipratropium 2.5 mg/3 ml nebulizer solution  Patient reports feeling better after treatment in the ER.      11:43 AM Patient's wheezing has resolved and she is feeling much better.    Discussed treatment, prescriptions, labs, and imaging results.    Discharge home with Deltasone, Albuterol sulfate 2.5 mg/0.5 mL Nebu, Flonase, Claritin, Ocean nasal, Proventil  Fill and take prescriptions as directed.  Return to the ED if symptoms worsen or do not resolve.   Answered  questions and discussed discharge plan.    Patient feels better and is ready for discharge.  Follow up with PCP/specialist in 1 day.        Scribe Attestation:   Scribe #1: I performed the above scribed service and the documentation accurately describes the services I performed. I attest to the accuracy of the note.                I, Dr. Lynette Templeton, personally performed the services described in this documentation. This document was produced by a scribe under my direction and in my presence. All medical record entries made by the scribe were at my direction and in my presence.  I have reviewed the chart and agree that the record reflects my personal performance and is accurate and complete. Lynette Templeton, DO.     01/26/2022 7:24 AM    Clinical Impression:   Final diagnoses:  [J45.901] Exacerbation of asthma, unspecified asthma severity, unspecified whether persistent (Primary)  [J45.909] Asthma          ED Disposition Condition    Discharge Stable        ED Prescriptions     Medication Sig Dispense Start Date End Date Auth. Provider    predniSONE (DELTASONE) 20 MG tablet Take 2 tablets (40 mg total) by mouth once daily. for 5 days 10 tablet 1/22/2022 1/27/2022 Lynette Templeton, DO    albuterol sulfate 2.5 mg/0.5 mL Nebu Take 2.5 mg by nebulization every 4 (four) hours as needed (As needed for wheezing). Rescue 30 each 1/22/2022 1/22/2023 Lynette Templeton, DO    fluticasone propionate (FLONASE) 50 mcg/actuation nasal spray 1 spray (50 mcg total) by Each Nostril route 2 (two) times daily. 16 g 1/22/2022  Lynette Templeton, DO    loratadine (CLARITIN) 10 mg tablet Take 1 tablet (10 mg total) by mouth once daily. 60 tablet 1/22/2022 1/22/2023 Lynette Templeton, DO    sodium chloride (OCEAN NASAL) 0.65 % nasal spray 1 spray by Nasal route every 3 (three) hours as needed for Congestion. 1 each 1/22/2022  Lynette Templeton, DO    albuterol (PROVENTIL/VENTOLIN HFA) 90 mcg/actuation inhaler Inhale 2 puffs into the lungs every 6 (six) hours  as needed for Wheezing. Use with spacer  Dispense with 1 spacer 20.1 g 1/22/2022 1/22/2023 Lynette Templeton DO        Follow-up Information     Follow up With Specialties Details Why Contact [facility] - Emergency Dept Emergency Medicine Go to  Please go to [facility] emergency department if symptoms worsen [address] [place] [id number] [phone]    Danilo Rangel MD Internal Medicine Schedule an appointment as soon as possible for a visit in 1 day  [address] [facility]  [place] [address]  [phone]             Lynette Templeton DO  01/26/22 0724

## 2022-01-27 ENCOUNTER — TELEPHONE (OUTPATIENT)
Dept: PREADMISSION TESTING | Facility: HOSPITAL | Age: 72
End: 2022-01-27
Payer: MEDICARE

## 2022-01-27 NOTE — TELEPHONE ENCOUNTER
----- Message from Yolande Peterson RN sent at 1/26/2022 10:16 AM CST -----  Regarding: DON'T Schedule With NP  (2/14) MR #[id number]- Please schedule ptn with Armaan and POC.  ThanksYolande   ----- Message -----  From: Yolande Peterson RN  Sent: 1/25/2022  11:56 AM CST  To: [facility] ([facility]) Scheduling    (2/14) Please schedule NP appt.  ThanksYolande

## 2022-02-09 DIAGNOSIS — M17.12 PRIMARY OSTEOARTHRITIS OF LEFT KNEE: Primary | ICD-10-CM

## 2022-02-11 ENCOUNTER — TELEPHONE (OUTPATIENT)
Dept: ORTHOPEDICS | Facility: CLINIC | Age: 72
End: 2022-02-11
Payer: MEDICARE

## 2022-02-11 ENCOUNTER — HOSPITAL ENCOUNTER (OUTPATIENT)
Dept: PREADMISSION TESTING | Facility: HOSPITAL | Age: 72
Discharge: HOME | End: 2022-02-11
Attending: ORTHOPAEDIC SURGERY
Payer: MEDICARE

## 2022-02-11 ENCOUNTER — ANESTHESIA EVENT (OUTPATIENT)
Dept: SURGERY | Facility: HOSPITAL | Age: 72
End: 2022-02-11
Payer: MEDICARE

## 2022-02-11 ENCOUNTER — LAB VISIT (OUTPATIENT)
Dept: PRIMARY CARE CLINIC | Facility: CLINIC | Age: 72
End: 2022-02-11
Payer: MEDICARE

## 2022-02-11 ENCOUNTER — OFFICE VISIT (OUTPATIENT)
Dept: INTERNAL MEDICINE | Facility: CLINIC | Age: 72
End: 2022-02-11
Payer: MEDICARE

## 2022-02-11 VITALS
OXYGEN SATURATION: 98 % | DIASTOLIC BLOOD PRESSURE: 69 MMHG | HEART RATE: 99 BPM | RESPIRATION RATE: 16 BRPM | TEMPERATURE: 98 F | HEIGHT: 63 IN | WEIGHT: 208 LBS | BODY MASS INDEX: 36.86 KG/M2 | SYSTOLIC BLOOD PRESSURE: 129 MMHG

## 2022-02-11 DIAGNOSIS — Z01.818 PREOP EXAMINATION: Primary | ICD-10-CM

## 2022-02-11 DIAGNOSIS — J45.909 ASTHMA, UNSPECIFIED ASTHMA SEVERITY, UNSPECIFIED WHETHER COMPLICATED, UNSPECIFIED WHETHER PERSISTENT: ICD-10-CM

## 2022-02-11 DIAGNOSIS — I83.92 VARICOSE VEINS OF LEFT LOWER EXTREMITY, UNSPECIFIED WHETHER COMPLICATED: ICD-10-CM

## 2022-02-11 DIAGNOSIS — Z01.818 PRE-OP TESTING: ICD-10-CM

## 2022-02-11 DIAGNOSIS — D64.9 ANEMIA, UNSPECIFIED TYPE: ICD-10-CM

## 2022-02-11 DIAGNOSIS — E11.65 UNCONTROLLED TYPE 2 DIABETES MELLITUS WITH HYPERGLYCEMIA: ICD-10-CM

## 2022-02-11 DIAGNOSIS — Z86.010 HISTORY OF COLONIC POLYPS: ICD-10-CM

## 2022-02-11 DIAGNOSIS — G56.01 CARPAL TUNNEL SYNDROME OF RIGHT WRIST: ICD-10-CM

## 2022-02-11 DIAGNOSIS — D05.12 DUCTAL CARCINOMA IN SITU (DCIS) OF LEFT BREAST: ICD-10-CM

## 2022-02-11 DIAGNOSIS — T78.40XD ALLERGY, SUBSEQUENT ENCOUNTER: ICD-10-CM

## 2022-02-11 DIAGNOSIS — E66.9 OBESITY, UNSPECIFIED CLASSIFICATION, UNSPECIFIED OBESITY TYPE, UNSPECIFIED WHETHER SERIOUS COMORBIDITY PRESENT: ICD-10-CM

## 2022-02-11 DIAGNOSIS — R42 POSTURAL DIZZINESS: ICD-10-CM

## 2022-02-11 DIAGNOSIS — O22.00 VARICOSE VEINS DURING PREGNANCY, ANTEPARTUM: ICD-10-CM

## 2022-02-11 DIAGNOSIS — E78.5 HYPERLIPIDEMIA, UNSPECIFIED HYPERLIPIDEMIA TYPE: ICD-10-CM

## 2022-02-11 DIAGNOSIS — N20.0 KIDNEY STONES: ICD-10-CM

## 2022-02-11 DIAGNOSIS — I10 PRIMARY HYPERTENSION: ICD-10-CM

## 2022-02-11 DIAGNOSIS — R06.83 SNORING: ICD-10-CM

## 2022-02-11 PROBLEM — R10.33 PERIUMBILICAL ABDOMINAL PAIN: Status: RESOLVED | Noted: 2017-01-23 | Resolved: 2022-02-11

## 2022-02-11 PROBLEM — T78.40XA ALLERGIES: Status: ACTIVE | Noted: 2022-02-11

## 2022-02-11 LAB
SARS-COV-2 RNA RESP QL NAA+PROBE: NOT DETECTED
SARS-COV-2- CYCLE NUMBER: NORMAL

## 2022-02-11 PROCEDURE — U0005 INFEC AGEN DETEC AMPLI PROBE: HCPCS | Performed by: ORTHOPAEDIC SURGERY

## 2022-02-11 PROCEDURE — U0003 INFECTIOUS AGENT DETECTION BY NUCLEIC ACID (DNA OR RNA); SEVERE ACUTE RESPIRATORY SYNDROME CORONAVIRUS 2 (SARS-COV-2) (CORONAVIRUS DISEASE [COVID-19]), AMPLIFIED PROBE TECHNIQUE, MAKING USE OF HIGH THROUGHPUT TECHNOLOGIES AS DESCRIBED BY CMS-2020-01-R: HCPCS | Performed by: ORTHOPAEDIC SURGERY

## 2022-02-11 PROCEDURE — 1160F RVW MEDS BY RX/DR IN RCRD: CPT | Mod: CPTII,S$GLB,, | Performed by: HOSPITALIST

## 2022-02-11 PROCEDURE — 99214 OFFICE O/P EST MOD 30 MIN: CPT | Mod: S$GLB,,, | Performed by: HOSPITALIST

## 2022-02-11 PROCEDURE — 3052F HG A1C>EQUAL 8.0%<EQUAL 9.0%: CPT | Mod: CPTII,S$GLB,, | Performed by: HOSPITALIST

## 2022-02-11 PROCEDURE — 99214 PR OFFICE/OUTPT VISIT, EST, LEVL IV, 30-39 MIN: ICD-10-PCS | Mod: S$GLB,,, | Performed by: HOSPITALIST

## 2022-02-11 PROCEDURE — 1160F PR REVIEW ALL MEDS BY PRESCRIBER/CLIN PHARMACIST DOCUMENTED: ICD-10-PCS | Mod: CPTII,S$GLB,, | Performed by: HOSPITALIST

## 2022-02-11 PROCEDURE — 3052F PR MOST RECENT HEMOGLOBIN A1C LEVEL 8.0 - < 9.0%: ICD-10-PCS | Mod: CPTII,S$GLB,, | Performed by: HOSPITALIST

## 2022-02-11 PROCEDURE — 99999 PR PBB SHADOW E&M-EST. PATIENT-LVL II: CPT | Mod: PBBFAC,,, | Performed by: HOSPITALIST

## 2022-02-11 PROCEDURE — 99999 PR PBB SHADOW E&M-EST. PATIENT-LVL II: ICD-10-PCS | Mod: PBBFAC,,, | Performed by: HOSPITALIST

## 2022-02-11 PROCEDURE — 1159F PR MEDICATION LIST DOCUMENTED IN MEDICAL RECORD: ICD-10-PCS | Mod: CPTII,S$GLB,, | Performed by: HOSPITALIST

## 2022-02-11 PROCEDURE — 1159F MED LIST DOCD IN RCRD: CPT | Mod: CPTII,S$GLB,, | Performed by: HOSPITALIST

## 2022-02-11 RX ORDER — ATORVASTATIN CALCIUM 20 MG/1
20 TABLET, FILM COATED ORAL DAILY
COMMUNITY
Start: 2022-01-23

## 2022-02-11 RX ORDER — DEXTROMETHORPHAN HYDROBROMIDE, GUAIFENESIN 5; 100 MG/5ML; MG/5ML
650 LIQUID ORAL 3 TIMES DAILY PRN
COMMUNITY
End: 2022-02-13 | Stop reason: HOSPADM

## 2022-02-11 RX ORDER — GLIPIZIDE 5 MG/1
5 TABLET ORAL
COMMUNITY
Start: 2022-01-24

## 2022-02-11 RX ORDER — MONTELUKAST SODIUM 10 MG/1
TABLET ORAL
COMMUNITY
Start: 2021-06-15

## 2022-02-11 NOTE — ASSESSMENT & PLAN NOTE
Tori Ca S/P Left Lumpectomy (Arimidex completed 12/2021)     She gets self exams, clinical exam and mammograms    She avoids, blood pressure checks, IV placements and blood draws on the left upper extremity    She is doing well from a breast cancer standpoint

## 2022-02-11 NOTE — HPI
History of present illness- I had the pleasure of meeting this pleasant 71 y.o. lady in the pre op clinic prior to her elective Orthopedic surgery. The patient is new to me .  Offered to have family member on the phone during the consultation process    I have obtained the history by speaking to the patient and by reviewing the electronic health records.    Events leading up to surgery / History of presenting illness -    She has been troubled with severe  Left knee  pain for 6-12 months .   Pain increases with activity and decreases with resting.  Takes arthritis strength Tylenol up to 2 a day  No previous left knee surgery    Relevant health conditions of significance for the perioperative period/ History of presenting illness -    Health conditions of significance for the perioperative period      -history of breast cancer   -type 2 diabetes  - HTN  - Asthma   - Obesity  - Kidney stones  - Allergies     She lives by herself in a ground level apartment complex  She has 4 children, 2 daughters and 2 sons  One of her son lives in [place]  Rest of her children live local  Her daughter lives close by  She has help available after surgery    She is retired and was working in the school kitchen until 2011

## 2022-02-11 NOTE — TELEPHONE ENCOUNTER
I called the patient today regarding surgery on 2/14/2022 with Dr. Steven Kapadia. I informed the patient that her surgery will be at  [facility] ([address] [place] [address]). I informed the patient they must arrive at 12:30pm and their surgery will start at 2:30pm.     I reminded the patient about her COVID-19 swab test. The patient is aware that she must complete the pre-procedural COVID-19 test on 2/11/2022 at the [facility] (located off of [facility]).    I reminded the patient that they cannot eat or drink after midnight, the night before surgery.     I reminded the patient to be careful of their skin over the next few days to make sure they do not get any cuts, scratches or scrapes.    The patient stated that she is going today to  her walker, bedside commode and shower chair from [facility].     I informed the patient that she will do a myChart Virtual Visit - Audio with our Orthopedic Nurse Navigator on 2/16/2022. I informed the patient to log into their virtual appointment 15 minutes before their scheduled time.    The patient verbalized understanding and has no further questions.         ----- Message from Ricki Kaiser sent at 2/11/2022  8:09 AM CST -----  Regarding: Pt. Advise  Contact: patient  Pt. Requesting a call back in regards to where to  walker and bed side commode for upcoming procedure.            Pt. @[phone]

## 2022-02-11 NOTE — ASSESSMENT & PLAN NOTE
No recent problem with kidney stones  She reports staying hydrated  She has not had any problem with kidney stones since 2016

## 2022-02-11 NOTE — ASSESSMENT & PLAN NOTE
She was taking anti-inflammatory medication for her left knee pain until about 5-6 months ago  Care suggested  No visible blood losses  No stomach upset

## 2022-02-11 NOTE — ASSESSMENT & PLAN NOTE
She is currently taking losartan hydrochlorothiazide  She was in the past taking valsartan hydrochlorothiazide    Home BP readings -none  Recent BP readings in the record-  Vitals - 1 value per visit 1/22/2022 2/11/2022   SYSTOLIC 138 129   DIASTOLIC 93 69     Goal BP     Manual BP check- 125 to 130/<80  Machine check- 120 to 125/<80    Suggested avoidance of salted food    Hypertension-  Blood pressure is acceptable .  I suggest holding   losartan hydrochlorothiazide -- on the morning of the surgery and can continue that  post operatively under blood pressure, electrolyte and renal function monitoring as long as they are acceptable.I suggest addressing pain control as uncontrolled pain can increased blood pressure

## 2022-02-11 NOTE — TELEPHONE ENCOUNTER
I called the patient regarding his updated surgery time of arrival. The patient is going to arrive at 9:00am. The patient verbalized understanding and has no further questions.

## 2022-02-11 NOTE — ASSESSMENT & PLAN NOTE
She is doing fairly well with allergies and takes over-the-counter Claritin, Flonase and Singulair

## 2022-02-11 NOTE — OUTPATIENT SUBJECTIVE & OBJECTIVE
Outpatient Subjective & Objective     Chief complaint-Preoperative evaluation, Perioperative Medical management, complication reduction plan     Active cardiac conditions- none    Revised cardiac risk index predictors- none    Functional capacity -Examples of physical activity,House work, Cooking,Goes to frenting 2 times a week,  can take a flight of stairs holding on to the railing----- She can undertake all the above activities without  chest pain,chest tightness,making her exercise tolerance more, than 4 Mets.   Mildly Winded on exertion, not new attributes to weight gain , she correlates her shortness of breath to the weight gain in the past 6 months  No orthopnea.  No PND  No chest pain, chest discomfort    Review of Systems   Constitutional: Negative for chills and fever.        Weight gain for the past 6 months   HENT:        STOPBANG score 5 / 8    Some Snoring   Elevated BP  BMI> 35   Age over 50   Neck size over 40 CM     Eyes:        No new visual changes   Respiratory:          Dry cough Not new   Occasional white phlegm   On and off for about an year   No Hemoptysis  No recent change, color, consistency , amount of the phlegm   Cardiovascular:        As noted   Gastrointestinal:        No overt GI/ blood losses  Bowel movements- Regular /daily  LMP- late 40's    Endocrine:        Prednisone use > 20 mg daily for 3 weeks- None   Genitourinary: Negative for dysuria.        No urinary hesitancy    Musculoskeletal:        As above      Skin: Negative for rash.   Neurological: Negative for syncope.        No unilateral weakness   Hematological:        Current use of Anticoagulants  None      Psychiatric/Behavioral:        No Depression,Anxiety     No vascular stenting             No anesthesia, bleeding, cardiac problems, PONV  with previous surgeries/procedures.  Medications and Allergies reviewed in epic.     FH- No anesthesia,bleeding / venous thrombosis ,Problems in family     Physical Exam    Vitals - 1  value per visit 2/11/2022   SYSTOLIC 129   DIASTOLIC 69   Pulse 99   Temp 98.4   Resp 16   SPO2 98   Weight (lb) 208   Weight (kg) 94.348   Height 63   BMI (Calculated) 36.9       Physical Exam  Constitutional- General appearance-Conscious,Coherent  Eyes- No conjunctival icterus,pupils  round  and reactive to light   ENT-Oral cavity- moist  , Hearing grossly normal   Neck- No thyromegaly ,Trachea -central, No jugular venous distension,   No Carotid Bruit   Cardiovascular -Heart Sounds- Normal  and  no murmur   , No gallop rhythm   Respiratory - Normal Respiratory Effort, Normal breath sounds, crepitations bases,  no wheeze  and  no forced expiratory wheeze    Peripheral pitting pedal edema-- none  and  varicose veins left lower extremity, no calf pain   Gastrointestinal -Soft abdomen, No palpable masses, Non Tender,Liver,Spleen not palpable. No-- free fluid and shifting dullness  Musculoskeletal- No finger Clubbing. Strength grossly normal   Lymphatic-No Palpable cervical, axillary,Inguinal lymphadenopathy   Psychiatric - normal effect,Orientation  Rt Dorsalis pedis pulses-palpable    Lt Dorsalis pedis pulses- palpable   Rt Posterior tibial pulses -palpable   Left posterior tibial pulses -palpable   Miscellaneous -  Surgical scarRUQ ,  no renal bruit and osteoarthritic nodes   I offered a gown and the presence of a chaperone during physical examination   She was comfortable to proceed with the exam without the the presence of a chaperone     Examined with female Med student     Investigations  Lab and Imaging have been reviewed in [facility].    Review of Medicine tests    EKG- I had independently reviewed the EKG from--1/22/2022  It was reported to be showing     Normal sinus rhythm   Possible Left atrial enlargement   Borderline Abnormal ECG   When compared with ECG of 21-JAN-2022 11:20,   No significant change was found    Review of clinical lab tests:  Lab Results   Component Value Date    CREATININE 0.8 01/21/2022     HGB 11.9 (L) 01/21/2022     01/21/2022       Review of old records- Was done and information gathered regards to events leading to surgery and health conditions of significance in the perioperative period.    Outpatient Subjective & Objective

## 2022-02-11 NOTE — TELEPHONE ENCOUNTER
I called the patient regarding her voice message. I gave the patent directions to the E location. The patient verbalized understanding and has no further questions.         ----- Message from Malika Harris sent at 2/11/2022 10:25 AM CST -----  Contact: Patient  Type: Patient Call Back         Who called: Patient         What is the request in detail: I have Abigail Pierre calling in regards to equipment she states he as to  before her procedure w/ Dr. Rodriguez; states it somewhere on [facility].           Best call back number: [phone]         Additional Information: equipment includes toilet seat; currently looking for location            Thank You

## 2022-02-11 NOTE — ASSESSMENT & PLAN NOTE
STOPBANG score 5 / 8    Some Snoring   Elevated BP  BMI> 35   Age over 50   Neck size over 40 CM      Possible sleep apnea- I suggest a sleep study and suggest caution with usage of medication that can cause respiratory suppression in the perioperative period  potential ramifications of untreated sleep apnea, which could include daytime sleepiness, hypertension, heart disease and stroke were discussed    Avoidance of  supine sleep, weight gain , alcoholic beverages , care with , sedative , CNS depressant use indicated  since all of these can worsen HERBERT     Care with sedating medication, opioid pain medication discussed    She is agreeable to have sleep evaluation

## 2022-02-11 NOTE — ASSESSMENT & PLAN NOTE
Increased risk of thrombosis in the jacquelyn operative period , compression stocking use discussed

## 2022-02-11 NOTE — ASSESSMENT & PLAN NOTE
Lately she is doing fine with asthma  She is no longer using the Breo and she is only using the albuterol inhaler  She is using albuterol up to 2 times a day  She also has a nebulizer that she could use if needed  She is doing well with the current regimen    asthma is controlled . I suggest consideration of inhaled bronchodilator use if the patient has perioperative bronchospasm     She is fully vaccinated with all 3 doses of COVID vaccine    Suggested care with the Tylenol products -DayQuil, NyQuil, Tylenol Arthritis  Suggested care with phenylephrine use which use in gradient and DayQuil as it can raise blood pressure

## 2022-02-11 NOTE — ANESTHESIA PREPROCEDURE EVALUATION
02/11/2022  Abigail Pierre is a 71 y.o., female.    Procedure Summary    Case: [id number] Date/Time: 02/14/22 1100   Procedure: ARTHROPLASTY, KNEE, TOTAL: LEFT: DEPUY-SIGMA (Left Knee)   Anesthesia type: Regional   Diagnosis: Primary osteoarthritis of left knee [M17.12]     Patient Active Problem List   Diagnosis    Carpal tunnel syndrome    Hyperlipidemia    HTN (hypertension)    DJD (degenerative joint disease) of knee    Asthma    Ductal carcinoma in situ (DCIS) of left breast    Diabetes mellitus type II, uncontrolled    History of colonic polyps    Kidney stones    Obesity    Allergies    Postural dizziness    Snoring    Anemia    Varicose veins of left lower extremity     Past Surgical History:   Procedure Laterality Date    BREAST SURGERY      CHOLECYSTECTOMY      COLONOSCOPY      CYSTOSCOPY W/ LASER LITHOTRIPSY       Current Discharge Medication List      START taking these medications    Details   acetaminophen (TYLENOL) 650 MG TbSR Take 1 tablet (650 mg total) by mouth every 8 (eight) hours.  Qty: 120 tablet, Refills: 0      aspirin (ECOTRIN) 81 MG EC tablet Take 1 tablet (81 mg total) by mouth 2 (two) times a day.  Qty: 60 tablet, Refills: 0      cefadroxil (DURICEF) 500 MG Cap Take 1 capsule (500 mg total) by mouth every 12 (twelve) hours.  Qty: 14 capsule, Refills: 0    Comments: Greater than 7 day supply is medically necessary. Deliver to [facility] for surgery 2/14/2022.      celecoxib (CELEBREX) 200 MG capsule Take 1 capsule (200 mg total) by mouth once daily.  Qty: 30 capsule, Refills: 0      docusate sodium (COLACE) 100 MG capsule Take 1 capsule (100 mg total) by mouth 2 (two) times daily.  Qty: 60 capsule, Refills: 0      methocarbamoL (ROBAXIN) 750 MG Tab Take 1 tablet (750 mg total) by mouth 4 (four) times daily as needed (as needed for muscle spasms).  Qty: 40 tablet,  Refills: 0      oxyCODONE (ROXICODONE) 5 MG immediate release tablet Take 1 tablet (5 mg total) by mouth every 4 (four) hours as needed for Pain.  Qty: 50 tablet, Refills: 0    Comments: Greater than 7 day supply is medically necessary. Deliver to [place] for surgery 2/14/2022.         CONTINUE these medications which have NOT CHANGED    Details   diclofenac sodium (VOLTAREN) 1 % Gel Apply 2 g topically once daily.  Qty: 100 g, Refills: 3      losartan-hydrochlorothiazide 100-25 mg (HYZAAR) 100-25 mg per tablet Take 1 tablet by mouth once daily.  Qty: 90 tablet, Refills: 2      albuterol (PROVENTIL/VENTOLIN HFA) 90 mcg/actuation inhaler Inhale 2 puffs into the lungs every 6 (six) hours as needed for Wheezing. Use with spacer  Dispense with 1 spacer  Qty: 20.1 g, Refills: 11      albuterol sulfate 2.5 mg/0.5 mL Nebu Take 2.5 mg by nebulization every 4 (four) hours as needed (As needed for wheezing). Rescue  Qty: 30 each, Refills: 0      atorvastatin (LIPITOR) 20 MG tablet Take 20 mg by mouth once daily.      DM/p-ephed/acetaminoph/doxylam (NYQUIL ORAL) Take by mouth as needed (Chest congestion).      fluticasone propionate (FLONASE) 50 mcg/actuation nasal spray 1 spray (50 mcg total) by Each Nostril route 2 (two) times daily.  Qty: 16 g, Refills: 0      glipiZIDE (GLUCOTROL) 5 MG tablet Take 5 mg by mouth daily with breakfast.      ketoconazole (NIZORAL) 2 % cream Apply topically once daily.  Qty: 1 Tube, Refills: 3      loratadine (CLARITIN) 10 mg tablet Take 1 tablet (10 mg total) by mouth once daily.  Qty: 60 tablet, Refills: 0      metformin (GLUCOPHAGE-XR) 500 MG 24 hr tablet TAKE 2 TABLETS(1000 MG) BY MOUTH TWICE DAILY  Qty: 120 tablet, Refills: 0      montelukast (SINGULAIR) 10 mg tablet montelukast Take 1 time per day No date recorded tablet 1 time per day No route recorded No set duration recorded No set duration amount recorded active 10 mg      sodium chloride (OCEAN NASAL) 0.65 % nasal spray 1 spray by  Nasal route every 3 (three) hours as needed for Congestion.  Qty: 1 each, Refills: 0      UNKNOWN TO PATIENT Dayquil               Anesthesia Evaluation    I have reviewed the Patient Summary Reports.    I have reviewed the Nursing Notes. I have reviewed the NPO Status.   I have reviewed the Medications.     Review of Systems  Anesthesia Hx:  Denies Family Hx of Anesthesia complications.   Denies Personal Hx of Anesthesia complications.   Social:  Non-Smoker, No Alcohol Use    Hematology/Oncology:         -- Cancer in past history: Breast   Cardiovascular:   Hypertension, well controlled hyperlipidemia    Pulmonary:   COPD, mild Asthma asymptomatic    Hepatic/GI:   GERD    Musculoskeletal:   Arthritis     Endocrine:   Diabetes, poorly controlled 1/22:  Hemoglobin A1c: 8       Physical Exam  General:  Well nourished, Obesity    Airway/Jaw/Neck:  Airway Findings: Mouth Opening: < 3 cm Tongue: Large  General Airway Assessment: Adult, Possible difficult mask airway, Possible difficult intubation  Mallampati: III  Improves to III with phonation.  TM Distance: Normal, at least 6 cm     Eyes/Ears/Nose:  Eyes/Ears/Nose Findings:    Dental:  Dental Findings: Periodontal disease, Severe         Mental Status:  Mental Status Findings:  Cooperative, Alert and Oriented         Anesthesia Plan  Type of Anesthesia, risks & benefits discussed:  Anesthesia Type:  spinal, MAC, general    Patient's Preference:   Plan Factors:          Intra-op Monitoring Plan: standard ASA monitors  Intra-op Monitoring Plan Comments:   Post Op Pain Control Plan: multimodal analgesia, IV/PO Opioids PRN and per primary service following discharge from PACU  Post Op Pain Control Plan Comments:     Induction:   IV  Beta Blocker:  Patient is not currently on a Beta-Blocker (No further documentation required).       Informed Consent: Patient understands risks and agrees with Anesthesia plan.  Questions answered. Anesthesia consent signed with patient.  ASA  Score: 2     Day of Surgery Review of History & Physical:    H&P update referred to the surgeon.         Ready For Surgery From Anesthesia Perspective.

## 2022-02-11 NOTE — ASSESSMENT & PLAN NOTE
Rt  She has not trouble with this couple of tunnel syndrome much  She picked up her walker today in preparation for her knee replacement surgery next few days  She feels that she will  be able to use a walker

## 2022-02-11 NOTE — ASSESSMENT & PLAN NOTE
Weight related conditions     Known to have     HTN  Type 2 Diabetes   Hyperlipidemia   Acid reflux - not now     Osteoarthritis    Not troubled with / Not known to have     Sleep apnea   Acid reflux   Fatty liver       Encouraged weight loss    No acid reflux in a long time  Her history of acid reflux does not sound cardiac in nature

## 2022-02-11 NOTE — PROGRESS NOTES
[facility] [address]  Progress Note    Patient Name: Abigail Pierre  MRN: [id number]  Date of Evaluation- 02/11/2022  PCP- Danilo Rangel MD    Future cases for Abigail Pierre [[id number]]     Case ID Status Date Time Dung Procedure Provider Location    [id number] [facility] 2/14/2022 11:00  ARTHROPLASTY, KNEE, TOTAL: LEFT: DEPUY-SIGMA Steven Kapadia III, MD [[id number]] [facility] OR          HPI:  History of present illness- I had the pleasure of meeting this pleasant 71 y.o. lady in the pre op clinic prior to her elective Orthopedic surgery. The patient is new to me .  Offered to have family member on the phone during the consultation process    I have obtained the history by speaking to the patient and by reviewing the electronic health records.    Events leading up to surgery / History of presenting illness -    She has been troubled with severe  Left knee  pain for 6-12 months .   Pain increases with activity and decreases with resting.  Takes arthritis strength Tylenol up to 2 a day  No previous left knee surgery    Relevant health conditions of significance for the perioperative period/ History of presenting illness -    Health conditions of significance for the perioperative period      -history of breast cancer   -type 2 diabetes  - HTN  - Asthma   - Obesity  - Kidney stones  - Allergies     She lives by herself in a ground level apartment complex  She has 4 children, 2 daughters and 2 sons  One of her son lives in [facility]  Rest of her children live local  Her daughter lives close by  She has help available after surgery    She is retired and was working in the school kitchen until 2011                 Subjective/ Objective:     Chief complaint-Preoperative evaluation, Perioperative Medical management, complication reduction plan     Active cardiac conditions- none    Revised cardiac risk index predictors- none    Functional capacity -Examples of physical activity,House work, Cooking,Goes to [employer] 2 times  a week,  can take a flight of stairs holding on to the railing----- She can undertake all the above activities without  chest pain,chest tightness,making her exercise tolerance more, than 4 Mets.   Mildly Winded on exertion, not new attributes to weight gain , she correlates her shortness of breath to the weight gain in the past 6 months  No orthopnea.  No PND  No chest pain, chest discomfort    Review of Systems   Constitutional: Negative for chills and fever.        Weight gain for the past 6 months   HENT:        STOPBANG score 5 / 8    Some Snoring   Elevated BP  BMI> 35   Age over 50   Neck size over 40 CM     Eyes:        No new visual changes   Respiratory:          Dry cough Not new   Occasional white phlegm   On and off for about an year   No Hemoptysis  No recent change, color, consistency , amount of the phlegm   Cardiovascular:        As noted   Gastrointestinal:        No overt GI/ blood losses  Bowel movements- Regular /daily  LMP- late 40's    Endocrine:        Prednisone use > 20 mg daily for 3 weeks- None   Genitourinary: Negative for dysuria.        No urinary hesitancy    Musculoskeletal:        As above      Skin: Negative for rash.   Neurological: Negative for syncope.        No unilateral weakness   Hematological:        Current use of Anticoagulants  None      Psychiatric/Behavioral:        No Depression,Anxiety     No vascular stenting             No anesthesia, bleeding, cardiac problems, PONV  with previous surgeries/procedures.  Medications and Allergies reviewed in epic.     FH- No anesthesia,bleeding / venous thrombosis ,Problems in family     Physical Exam    Vitals - 1 value per visit 2/11/2022   SYSTOLIC 129   DIASTOLIC 69   Pulse 99   Temp 98.4   Resp 16   SPO2 98   Weight (lb) 208   Weight (kg) 94.348   Height 63   BMI (Calculated) 36.9       Physical Exam  Constitutional- General appearance-Conscious,Coherent  Eyes- No conjunctival icterus,pupils  round  and reactive to light    ENT-Oral cavity- moist  , Hearing grossly normal   Neck- No thyromegaly ,Trachea -central, No jugular venous distension,   No Carotid Bruit   Cardiovascular -Heart Sounds- Normal  and  no murmur   , No gallop rhythm   Respiratory - Normal Respiratory Effort, Normal breath sounds, crepitations bases,  no wheeze  and  no forced expiratory wheeze    Peripheral pitting pedal edema-- none  and  varicose veins left lower extremity, no calf pain   Gastrointestinal -Soft abdomen, No palpable masses, Non Tender,Liver,Spleen not palpable. No-- free fluid and shifting dullness  Musculoskeletal- No finger Clubbing. Strength grossly normal   Lymphatic-No Palpable cervical, axillary,Inguinal lymphadenopathy   Psychiatric - normal effect,Orientation  Rt Dorsalis pedis pulses-palpable    Lt Dorsalis pedis pulses- palpable   Rt Posterior tibial pulses -palpable   Left posterior tibial pulses -palpable   Miscellaneous -  Surgical scarRUQ ,  no renal bruit and osteoarthritic nodes   I offered a gown and the presence of a chaperone during physical examination   She was comfortable to proceed with the exam without the the presence of a chaperone     Examined with female Med student     Investigations  Lab and Imaging have been reviewed in [facility].    Review of Medicine tests    EKG- I had independently reviewed the EKG from--1/22/2022  It was reported to be showing     Normal sinus rhythm   Possible Left atrial enlargement   Borderline Abnormal ECG   When compared with ECG of 21-JAN-2022 11:20,   No significant change was found    Review of clinical lab tests:  Lab Results   Component Value Date    CREATININE 0.8 01/21/2022    HGB 11.9 (L) 01/21/2022     01/21/2022       Review of old records- Was done and information gathered regards to events leading to surgery and health conditions of significance in the perioperative period.        Preoperative cardiac risk assessment-  The patient does not have any active cardiac conditions .  Revised cardiac risk index predictors-0 ---.Functional capacity is more than 4 Mets. She will be undergoing a Orthopedic procedure that carries a Moderate Risk risk     Risk of a major Cardiac event ( Defined as death, myocardial infarction, or cardiac arrest at 30 days after noncardiac surgery), based on RCRI score     -3.9%         No further cardiac work up is indicated prior to proceeding with the surgery     Orders Placed This Encounter    Ambulatory referral/consult to Sleep Disorders       American Society of Anesthesiologists Physical status classification ( ASA ) class: 2     Postoperative pulmonary complication risk assessment:      ARISCAT ( Canet) risk index- risk class -  Low, if duration of surgery is under 3 hours, intermediate, if duration of surgery is over 3 hours       Assessment/Plan:     Carpal tunnel syndrome  Rt  She has not trouble with this couple of tunnel syndrome much  She picked up her walker today in preparation for her knee replacement surgery next few days  She feels that she will  be able to use a walker    Asthma  Lately she is doing fine with asthma  She is no longer using the Breo and she is only using the albuterol inhaler  She is using albuterol up to 2 times a day  She also has a nebulizer that she could use if needed  She is doing well with the current regimen    asthma is controlled . I suggest consideration of inhaled bronchodilator use if the patient has perioperative bronchospasm     She is fully vaccinated with all 3 doses of COVID vaccine    Suggested care with the Tylenol products -DayQuil, NyQuil, Tylenol Arthritis  Suggested care with phenylephrine use which use in gradient and DayQuil as it can raise blood pressure      Hyperlipidemia  HLD-I  suggest continuation of statin during the entire perioperative period.    HTN (hypertension)  She is currently taking losartan hydrochlorothiazide  She was in the past taking valsartan hydrochlorothiazide    Home BP readings  -none  Recent BP readings in the record-  Vitals - 1 value per visit 1/22/2022 2/11/2022   SYSTOLIC 138 129   DIASTOLIC 93 69     Goal BP     Manual BP check- 125 to 130/<80  Machine check- 120 to 125/<80    Suggested avoidance of salted food    Hypertension-  Blood pressure is acceptable .  I suggest holding   losartan hydrochlorothiazide -- on the morning of the surgery and can continue that  post operatively under blood pressure, electrolyte and renal function monitoring as long as they are acceptable.I suggest addressing pain control as uncontrolled pain can increased blood pressure     Kidney stones  No recent problem with kidney stones  She reports staying hydrated  She has not had any problem with kidney stones since 2016    Ductal carcinoma in situ (DCIS) of left breast  Breat Ca S/P Left Lumpectomy (Arimidex completed 12/2021)     She gets self exams, clinical exam and mammograms    She avoids, blood pressure checks, IV placements and blood draws on the left upper extremity    She is doing well from a breast cancer standpoint    Diabetes mellitus type II, uncontrolled  Type 2 Diabetes Mellitus  On treatment with oral agents,Not  Insulin    She is taking metformin 1000 mg by mouth twice a day  Glipizide was added to her treatment about 3 or 4 months ago    Hemoglobin A1c- 8 Jan 2022  Capillary glucose check-None   Suggested checking capillary glucose    Diabetes Complications     Microvascular     Not known to have   Diabetes affecting the eyes, Kidneys   No tingling numbness of hands Not  feet  No reported open areas on the feet   Feet care suggested     Macrovascular     No stroke/ TIA  Not known to have CAD  No suggestion of  lower extremity claudication      Diabetes Mellitus-I suggest monitoring the glucose in the perioperative period ( Before meals and bed time,if the patient is on oral feeds or every 6 hourly ,if the patient is NPO )  Blood glucose target in hospitalized patients is 140-180. Oral  Hypoglycemic agents are generally avoided during the hospital stay . If glucose is consistently elevated ,I suggest using basal ,prandial Insulin regimen to control the glucose , as elevated glucose can be associated with adverse surgical out comes. Please consider involving Hospital Medicine or Endocrinology ,if any help is needed with Glucose control. Patient will be instructed based on the pre op clinic guidelines  about adjustment of diabetic treatment (If applicable )  considering the NPO status for Surgery      I had educated that uncontrolled DM can cause post op complications,risk of infection, wound healing problem,increased length of stay in hospital and its associated complications.I suggest exercise as much as possible and follow diabetic diet     She likes sweets   Suggested avoidance of sweets       History of colonic polyps  Suggested follow-up    Obesity  Weight related conditions     Known to have     HTN  Type 2 Diabetes   Hyperlipidemia   Acid reflux - not now     Osteoarthritis    Not troubled with / Not known to have     Sleep apnea   Acid reflux   Fatty liver       Encouraged weight loss    No acid reflux in a long time  Her history of acid reflux does not sound cardiac in nature    Allergies  She is doing fairly well with allergies and takes over-the-counter Claritin, Flonase and Singulair      Postural dizziness  Gets dizzy on position changes   Suggested to change positions gradually and to stay hydrated     Snoring  STOPBANG score 5 / 8    Some Snoring   Elevated BP  BMI> 35   Age over 50   Neck size over 40 CM      Possible sleep apnea- I suggest a sleep study and suggest caution with usage of medication that can cause respiratory suppression in the perioperative period  potential ramifications of untreated sleep apnea, which could include daytime sleepiness, hypertension, heart disease and stroke were discussed    Avoidance of  supine sleep, weight gain , alcoholic beverages , care with ,  sedative , CNS depressant use indicated  since all of these can worsen HERBERT     Care with sedating medication, opioid pain medication discussed    She is agreeable to have sleep evaluation       Anemia  She was taking anti-inflammatory medication for her left knee pain until about 5-6 months ago  Care suggested  No visible blood losses  No stomach upset    Varicose veins of left lower extremity  Increased risk of thrombosis in the jacquelyn operative period , compression stocking use discussed        Preventive perioperative care    Thromboembolic prophylaxis:  Her risk factors for thrombosis include surgical procedure and age.I suggest  thromboembolic prophylaxis ( mechanical/pharmacological, weighing the risk benefits of pharmacological agent use considering jacquelyn procedural bleeding )  during the perioperative period.I suggested being active in the post operative period.   The patient is a candidate for extended DVT prophylaxis     Postoperative pulmonary complication prophylaxis-Risk factors for post operative pulmonary complications include age over 65 years- I suggest incentive spirometry use, early ambulation and end tidal carbon dioxide monitoring  , oral care , head end of bed elevation      Renal complication prophylaxis-Risk factors for renal complications include diabetes mellitus and hypertension . I suggest keeping her well hydrated  in the perioperative period.     Surgical site Infection Prophylaxis-I  suggest appropriate antibiotic for Prophylaxis against Surgical site infections  No reported Staph infection  Skin antibacterial discussed          In view of Regional anesthesia the patient  is at risk of postoperative urinary retention.  I suggest avoidance / minimizing the of  Benzodiazepines,Anticholinergic medication,antihistamines ( Benadryl) , if possible in the perioperative period. I suggest using the minimum possible use of opioids for the minimum period of time in the perioperative period. Benadryl  avoidance suggested      This visit was focused on Preoperative evaluation, Perioperative Medical management, complication reduction plans. I suggest that the patient follows up with primary care or relevant sub specialists for ongoing health care.    I appreciate the opportunity to be involved in this patients care. Please feel free to contact me if there were any questions about this consultation.    Patient is optimized    Patient was instructed to call and update me about any changes to health,  medication, office visits ,testing out side of the [facility] operative care center , hospitalizations between now and surgery      Ruthie Hall MD  Internal Medicine  [facility]   Cell Phone- [phone]    History of COVID - /no   COVID vaccination status -3    COVID screening     No fever   No new  cough   No new SOB  No sore throat   No loss of taste or smell   No muscle aches   No nausea, vomiting , diarrhea -    No suggestions of DVT    Medication instructions discussed   --  2/11/2022- 17 21    Varicose vein on the Left knee area - not new   Long standing Varicose veins Left side leg   No chest pain     Called to follow up , spoke to her  to address any concerns with the up coming surgery or any questions on Medication instructions-   Doing well ,No changes to Medication, Health -

## 2022-02-11 NOTE — ASSESSMENT & PLAN NOTE
Type 2 Diabetes Mellitus  On treatment with oral agents,Not  Insulin    She is taking metformin 1000 mg by mouth twice a day  Glipizide was added to her treatment about 3 or 4 months ago    Hemoglobin A1c- 8 Jan 2022  Capillary glucose check-None   Suggested checking capillary glucose    Diabetes Complications     Microvascular     Not known to have   Diabetes affecting the eyes, Kidneys   No tingling numbness of hands Not  feet  No reported open areas on the feet   Feet care suggested     Macrovascular     No stroke/ TIA  Not known to have CAD  No suggestion of  lower extremity claudication      Diabetes Mellitus-I suggest monitoring the glucose in the perioperative period ( Before meals and bed time,if the patient is on oral feeds or every 6 hourly ,if the patient is NPO )  Blood glucose target in hospitalized patients is 140-180. Oral Hypoglycemic agents are generally avoided during the hospital stay . If glucose is consistently elevated ,I suggest using basal ,prandial Insulin regimen to control the glucose , as elevated glucose can be associated with adverse surgical out comes. Please consider involving Hospital Medicine or Endocrinology ,if any help is needed with Glucose control. Patient will be instructed based on the pre op clinic guidelines  about adjustment of diabetic treatment (If applicable )  considering the NPO status for Surgery      I had educated that uncontrolled DM can cause post op complications,risk of infection, wound healing problem,increased length of stay in hospital and its associated complications.I suggest exercise as much as possible and follow diabetic diet     She likes sweets   Suggested avoidance of sweets

## 2022-02-13 RX ORDER — ASPIRIN 81 MG/1
81 TABLET ORAL 2 TIMES DAILY
Qty: 60 TABLET | Refills: 0 | Status: SHIPPED | OUTPATIENT
Start: 2022-02-13 | End: 2023-02-13

## 2022-02-13 RX ORDER — CELECOXIB 200 MG/1
200 CAPSULE ORAL DAILY
Qty: 30 CAPSULE | Refills: 0 | Status: SHIPPED | OUTPATIENT
Start: 2022-02-13 | End: 2022-03-25 | Stop reason: SDUPTHER

## 2022-02-13 RX ORDER — OXYCODONE HYDROCHLORIDE 5 MG/1
5 TABLET ORAL EVERY 4 HOURS PRN
Qty: 50 TABLET | Refills: 0 | Status: SHIPPED | OUTPATIENT
Start: 2022-02-13

## 2022-02-13 RX ORDER — DOCUSATE SODIUM 100 MG/1
100 CAPSULE, LIQUID FILLED ORAL 2 TIMES DAILY
Qty: 60 CAPSULE | Refills: 0 | Status: SHIPPED | OUTPATIENT
Start: 2022-02-13

## 2022-02-13 RX ORDER — CEFADROXIL 500 MG/1
500 CAPSULE ORAL EVERY 12 HOURS
Qty: 14 CAPSULE | Refills: 0 | Status: SHIPPED | OUTPATIENT
Start: 2022-02-13

## 2022-02-13 RX ORDER — DEXTROMETHORPHAN HYDROBROMIDE, GUAIFENESIN 5; 100 MG/5ML; MG/5ML
650 LIQUID ORAL EVERY 8 HOURS
Qty: 120 TABLET | Refills: 0 | OUTPATIENT
Start: 2022-02-13 | End: 2023-10-03

## 2022-02-13 RX ORDER — METHOCARBAMOL 750 MG/1
750 TABLET, FILM COATED ORAL 4 TIMES DAILY PRN
Qty: 40 TABLET | Refills: 0 | Status: SHIPPED | OUTPATIENT
Start: 2022-02-13 | End: 2022-02-25

## 2022-02-14 ENCOUNTER — HOSPITAL ENCOUNTER (OUTPATIENT)
Facility: HOSPITAL | Age: 72
Discharge: HOME | End: 2022-02-15
Attending: ORTHOPAEDIC SURGERY | Admitting: ORTHOPAEDIC SURGERY
Payer: MEDICARE

## 2022-02-14 ENCOUNTER — ANESTHESIA (OUTPATIENT)
Dept: SURGERY | Facility: HOSPITAL | Age: 72
End: 2022-02-14
Payer: MEDICARE

## 2022-02-14 DIAGNOSIS — M17.12 PRIMARY OSTEOARTHRITIS OF LEFT KNEE: ICD-10-CM

## 2022-02-14 LAB
POCT GLUCOSE: 155 MG/DL (ref 70–110)
POCT GLUCOSE: 181 MG/DL (ref 70–110)
POCT GLUCOSE: 202 MG/DL (ref 70–110)
POCT GLUCOSE: 345 MG/DL (ref 70–110)

## 2022-02-14 PROCEDURE — 94799 UNLISTED PULMONARY SVC/PX: CPT

## 2022-02-14 PROCEDURE — C1713 ANCHOR/SCREW BN/BN,TIS/BN: HCPCS | Performed by: ORTHOPAEDIC SURGERY

## 2022-02-14 PROCEDURE — 63600175 PHARM REV CODE 636 W HCPCS: Performed by: NURSE PRACTITIONER

## 2022-02-14 PROCEDURE — 25000003 PHARM REV CODE 250: Performed by: NURSE PRACTITIONER

## 2022-02-14 PROCEDURE — 25000003 PHARM REV CODE 250: Performed by: ANESTHESIOLOGY

## 2022-02-14 PROCEDURE — D9220A PRA ANESTHESIA: Mod: CRNA,,, | Performed by: NURSE ANESTHETIST, CERTIFIED REGISTERED

## 2022-02-14 PROCEDURE — 25000003 PHARM REV CODE 250: Performed by: ORTHOPAEDIC SURGERY

## 2022-02-14 PROCEDURE — 97161 PT EVAL LOW COMPLEX 20 MIN: CPT

## 2022-02-14 PROCEDURE — D9220A PRA ANESTHESIA: Mod: ANES,,, | Performed by: ANESTHESIOLOGY

## 2022-02-14 PROCEDURE — 99900035 HC TECH TIME PER 15 MIN (STAT)

## 2022-02-14 PROCEDURE — 63600175 PHARM REV CODE 636 W HCPCS: Performed by: STUDENT IN AN ORGANIZED HEALTH CARE EDUCATION/TRAINING PROGRAM

## 2022-02-14 PROCEDURE — C1776 JOINT DEVICE (IMPLANTABLE): HCPCS | Performed by: ORTHOPAEDIC SURGERY

## 2022-02-14 PROCEDURE — 97535 SELF CARE MNGMENT TRAINING: CPT

## 2022-02-14 PROCEDURE — 25000003 PHARM REV CODE 250: Performed by: NURSE ANESTHETIST, CERTIFIED REGISTERED

## 2022-02-14 PROCEDURE — 27447 TOTAL KNEE ARTHROPLASTY: CPT | Mod: LT,,, | Performed by: ORTHOPAEDIC SURGERY

## 2022-02-14 PROCEDURE — 37000009 HC ANESTHESIA EA ADD 15 MINS: Performed by: ORTHOPAEDIC SURGERY

## 2022-02-14 PROCEDURE — 97165 OT EVAL LOW COMPLEX 30 MIN: CPT

## 2022-02-14 PROCEDURE — D9220A PRA ANESTHESIA: ICD-10-PCS | Mod: CRNA,,, | Performed by: NURSE ANESTHETIST, CERTIFIED REGISTERED

## 2022-02-14 PROCEDURE — 37000008 HC ANESTHESIA 1ST 15 MINUTES: Performed by: ORTHOPAEDIC SURGERY

## 2022-02-14 PROCEDURE — 63600175 PHARM REV CODE 636 W HCPCS: Performed by: NURSE ANESTHETIST, CERTIFIED REGISTERED

## 2022-02-14 PROCEDURE — D9220A PRA ANESTHESIA: ICD-10-PCS | Mod: ANES,,, | Performed by: ANESTHESIOLOGY

## 2022-02-14 PROCEDURE — 27201423 OPTIME MED/SURG SUP & DEVICES STERILE SUPPLY: Performed by: ORTHOPAEDIC SURGERY

## 2022-02-14 PROCEDURE — 63600175 PHARM REV CODE 636 W HCPCS: Performed by: ANESTHESIOLOGY

## 2022-02-14 PROCEDURE — 82962 GLUCOSE BLOOD TEST: CPT | Mod: 91 | Performed by: ORTHOPAEDIC SURGERY

## 2022-02-14 PROCEDURE — 71000033 HC RECOVERY, INTIAL HOUR: Performed by: ORTHOPAEDIC SURGERY

## 2022-02-14 PROCEDURE — 71000039 HC RECOVERY, EACH ADD'L HOUR: Performed by: ORTHOPAEDIC SURGERY

## 2022-02-14 PROCEDURE — 36000710: Performed by: ORTHOPAEDIC SURGERY

## 2022-02-14 PROCEDURE — 94761 N-INVAS EAR/PLS OXIMETRY MLT: CPT

## 2022-02-14 PROCEDURE — 97116 GAIT TRAINING THERAPY: CPT

## 2022-02-14 PROCEDURE — 63600175 PHARM REV CODE 636 W HCPCS: Performed by: ORTHOPAEDIC SURGERY

## 2022-02-14 PROCEDURE — 36000711: Performed by: ORTHOPAEDIC SURGERY

## 2022-02-14 PROCEDURE — 27447 PR TOTAL KNEE ARTHROPLASTY: ICD-10-PCS | Mod: LT,,, | Performed by: ORTHOPAEDIC SURGERY

## 2022-02-14 DEVICE — PATELLA OVAL DOME 35MM: Type: IMPLANTABLE DEVICE | Site: KNEE | Status: FUNCTIONAL

## 2022-02-14 DEVICE — INSERT TIBIAL SZ 2.5 10MM PFC: Type: IMPLANTABLE DEVICE | Site: KNEE | Status: FUNCTIONAL

## 2022-02-14 DEVICE — CEMENT BONE SURG SMPLX P RADPQ: Type: IMPLANTABLE DEVICE | Site: KNEE | Status: FUNCTIONAL

## 2022-02-14 DEVICE — TRAY TIBIAL CEMENTED 2.5 COBAL: Type: IMPLANTABLE DEVICE | Site: KNEE | Status: FUNCTIONAL

## 2022-02-14 DEVICE — COMPONENT FEM POST SZ 2.5 LEF: Type: IMPLANTABLE DEVICE | Site: KNEE | Status: FUNCTIONAL

## 2022-02-14 RX ORDER — PROPOFOL 10 MG/ML
VIAL (ML) INTRAVENOUS
Status: DISCONTINUED | OUTPATIENT
Start: 2022-02-14 | End: 2022-02-14

## 2022-02-14 RX ORDER — PREGABALIN 75 MG/1
75 CAPSULE ORAL
Status: COMPLETED | OUTPATIENT
Start: 2022-02-14 | End: 2022-02-14

## 2022-02-14 RX ORDER — VANCOMYCIN HYDROCHLORIDE 1 G/20ML
INJECTION, POWDER, LYOPHILIZED, FOR SOLUTION INTRAVENOUS
Status: DISCONTINUED | OUTPATIENT
Start: 2022-02-14 | End: 2022-02-14 | Stop reason: HOSPADM

## 2022-02-14 RX ORDER — FENTANYL CITRATE 50 UG/ML
100 INJECTION, SOLUTION INTRAMUSCULAR; INTRAVENOUS
Status: DISCONTINUED | OUTPATIENT
Start: 2022-02-14 | End: 2022-02-14 | Stop reason: HOSPADM

## 2022-02-14 RX ORDER — MIDAZOLAM HYDROCHLORIDE 1 MG/ML
INJECTION, SOLUTION INTRAMUSCULAR; INTRAVENOUS
Status: DISCONTINUED | OUTPATIENT
Start: 2022-02-14 | End: 2022-02-14

## 2022-02-14 RX ORDER — MUPIROCIN 20 MG/G
1 OINTMENT TOPICAL
Status: DISCONTINUED | OUTPATIENT
Start: 2022-02-14 | End: 2022-02-14 | Stop reason: HOSPADM

## 2022-02-14 RX ORDER — TRANEXAMIC ACID 100 MG/ML
1000 INJECTION, SOLUTION INTRAVENOUS
Status: DISCONTINUED | OUTPATIENT
Start: 2022-02-14 | End: 2022-02-14 | Stop reason: HOSPADM

## 2022-02-14 RX ORDER — LOSARTAN POTASSIUM AND HYDROCHLOROTHIAZIDE 12.5; 5 MG/1; MG/1
2 TABLET ORAL DAILY
Status: DISCONTINUED | OUTPATIENT
Start: 2022-02-14 | End: 2022-02-15 | Stop reason: HOSPADM

## 2022-02-14 RX ORDER — ONDANSETRON 2 MG/ML
4 INJECTION INTRAMUSCULAR; INTRAVENOUS DAILY PRN
Status: DISCONTINUED | OUTPATIENT
Start: 2022-02-14 | End: 2022-02-14 | Stop reason: HOSPADM

## 2022-02-14 RX ORDER — LIDOCAINE HYDROCHLORIDE 10 MG/ML
1 INJECTION, SOLUTION EPIDURAL; INFILTRATION; INTRACAUDAL; PERINEURAL
Status: DISCONTINUED | OUTPATIENT
Start: 2022-02-14 | End: 2022-02-14 | Stop reason: HOSPADM

## 2022-02-14 RX ORDER — SODIUM CHLORIDE 0.9 % (FLUSH) 0.9 %
10 SYRINGE (ML) INJECTION
Status: DISCONTINUED | OUTPATIENT
Start: 2022-02-14 | End: 2022-02-14 | Stop reason: HOSPADM

## 2022-02-14 RX ORDER — FENTANYL CITRATE 50 UG/ML
25 INJECTION, SOLUTION INTRAMUSCULAR; INTRAVENOUS EVERY 5 MIN PRN
Status: DISCONTINUED | OUTPATIENT
Start: 2022-02-14 | End: 2022-02-14 | Stop reason: HOSPADM

## 2022-02-14 RX ORDER — IBUPROFEN 200 MG
24 TABLET ORAL
Status: DISCONTINUED | OUTPATIENT
Start: 2022-02-14 | End: 2022-02-14

## 2022-02-14 RX ORDER — OXYMETAZOLINE HCL 0.05 %
SPRAY, NON-AEROSOL (ML) NASAL
Status: DISCONTINUED | OUTPATIENT
Start: 2022-02-14 | End: 2022-02-14

## 2022-02-14 RX ORDER — ACETAMINOPHEN 500 MG
1000 TABLET ORAL EVERY 6 HOURS
Status: DISCONTINUED | OUTPATIENT
Start: 2022-02-14 | End: 2022-02-15 | Stop reason: HOSPADM

## 2022-02-14 RX ORDER — TALC
6 POWDER (GRAM) TOPICAL NIGHTLY PRN
Status: DISCONTINUED | OUTPATIENT
Start: 2022-02-14 | End: 2022-02-14 | Stop reason: HOSPADM

## 2022-02-14 RX ORDER — ROPIVACAINE HYDROCHLORIDE 2 MG/ML
8 INJECTION, SOLUTION EPIDURAL; INFILTRATION; PERINEURAL CONTINUOUS
Status: DISCONTINUED | OUTPATIENT
Start: 2022-02-14 | End: 2022-02-15 | Stop reason: HOSPADM

## 2022-02-14 RX ORDER — HALOPERIDOL 5 MG/ML
0.5 INJECTION INTRAMUSCULAR EVERY 10 MIN PRN
Status: DISCONTINUED | OUTPATIENT
Start: 2022-02-14 | End: 2022-02-14 | Stop reason: HOSPADM

## 2022-02-14 RX ORDER — SODIUM CHLORIDE 9 MG/ML
INJECTION, SOLUTION INTRAVENOUS
Status: COMPLETED | OUTPATIENT
Start: 2022-02-14 | End: 2022-02-14

## 2022-02-14 RX ORDER — CEFAZOLIN SODIUM/D5W 2 G/100 ML
2 PLASTIC BAG, INJECTION (ML) INTRAVENOUS
Status: COMPLETED | OUTPATIENT
Start: 2022-02-14 | End: 2022-02-15

## 2022-02-14 RX ORDER — FENTANYL CITRATE 50 UG/ML
INJECTION, SOLUTION INTRAMUSCULAR; INTRAVENOUS
Status: DISCONTINUED | OUTPATIENT
Start: 2022-02-14 | End: 2022-02-14

## 2022-02-14 RX ORDER — ROPIVACAINE/EPI/CLONIDINE/KET 2.46-0.005
SYRINGE (ML) INJECTION
Status: DISCONTINUED | OUTPATIENT
Start: 2022-02-14 | End: 2022-02-14 | Stop reason: HOSPADM

## 2022-02-14 RX ORDER — METHOCARBAMOL 750 MG/1
750 TABLET, FILM COATED ORAL 3 TIMES DAILY
Status: DISCONTINUED | OUTPATIENT
Start: 2022-02-14 | End: 2022-02-15 | Stop reason: HOSPADM

## 2022-02-14 RX ORDER — POLYETHYLENE GLYCOL 3350 17 G/17G
17 POWDER, FOR SOLUTION ORAL DAILY
Status: DISCONTINUED | OUTPATIENT
Start: 2022-02-15 | End: 2022-02-15 | Stop reason: HOSPADM

## 2022-02-14 RX ORDER — ASPIRIN 81 MG/1
81 TABLET ORAL 2 TIMES DAILY
Status: DISCONTINUED | OUTPATIENT
Start: 2022-02-14 | End: 2022-02-15 | Stop reason: HOSPADM

## 2022-02-14 RX ORDER — IBUPROFEN 200 MG
24 TABLET ORAL
Status: DISCONTINUED | OUTPATIENT
Start: 2022-02-14 | End: 2022-02-15 | Stop reason: HOSPADM

## 2022-02-14 RX ORDER — DEXAMETHASONE SODIUM PHOSPHATE 4 MG/ML
INJECTION, SOLUTION INTRA-ARTICULAR; INTRALESIONAL; INTRAMUSCULAR; INTRAVENOUS; SOFT TISSUE
Status: DISCONTINUED | OUTPATIENT
Start: 2022-02-14 | End: 2022-02-14

## 2022-02-14 RX ORDER — FLUTICASONE PROPIONATE 50 MCG
1 SPRAY, SUSPENSION (ML) NASAL 2 TIMES DAILY
Status: DISCONTINUED | OUTPATIENT
Start: 2022-02-14 | End: 2022-02-15 | Stop reason: HOSPADM

## 2022-02-14 RX ORDER — FAMOTIDINE 10 MG/ML
INJECTION INTRAVENOUS
Status: DISCONTINUED | OUTPATIENT
Start: 2022-02-14 | End: 2022-02-14

## 2022-02-14 RX ORDER — CEFADROXIL 500 MG/1
500 CAPSULE ORAL EVERY 12 HOURS
Status: DISCONTINUED | OUTPATIENT
Start: 2022-02-15 | End: 2022-02-15 | Stop reason: HOSPADM

## 2022-02-14 RX ORDER — OXYCODONE HYDROCHLORIDE 10 MG/1
10 TABLET ORAL
Status: DISCONTINUED | OUTPATIENT
Start: 2022-02-14 | End: 2022-02-15 | Stop reason: HOSPADM

## 2022-02-14 RX ORDER — FENTANYL CITRATE 50 UG/ML
25 INJECTION, SOLUTION INTRAMUSCULAR; INTRAVENOUS EVERY 5 MIN PRN
Status: COMPLETED | OUTPATIENT
Start: 2022-02-14 | End: 2022-02-14

## 2022-02-14 RX ORDER — PROCHLORPERAZINE EDISYLATE 5 MG/ML
5 INJECTION INTRAMUSCULAR; INTRAVENOUS EVERY 6 HOURS PRN
Status: DISCONTINUED | OUTPATIENT
Start: 2022-02-14 | End: 2022-02-15 | Stop reason: HOSPADM

## 2022-02-14 RX ORDER — OXYCODONE HYDROCHLORIDE 5 MG/1
5 TABLET ORAL
Status: DISCONTINUED | OUTPATIENT
Start: 2022-02-14 | End: 2022-02-14 | Stop reason: HOSPADM

## 2022-02-14 RX ORDER — AMOXICILLIN 250 MG
1 CAPSULE ORAL 2 TIMES DAILY
Status: DISCONTINUED | OUTPATIENT
Start: 2022-02-14 | End: 2022-02-15 | Stop reason: HOSPADM

## 2022-02-14 RX ORDER — LIDOCAINE HCL/PF 100 MG/5ML
SYRINGE (ML) INTRAVENOUS
Status: DISCONTINUED | OUTPATIENT
Start: 2022-02-14 | End: 2022-02-14

## 2022-02-14 RX ORDER — ONDANSETRON 2 MG/ML
4 INJECTION INTRAMUSCULAR; INTRAVENOUS EVERY 8 HOURS PRN
Status: DISCONTINUED | OUTPATIENT
Start: 2022-02-14 | End: 2022-02-15 | Stop reason: HOSPADM

## 2022-02-14 RX ORDER — GLIPIZIDE 5 MG/1
5 TABLET ORAL
Status: DISCONTINUED | OUTPATIENT
Start: 2022-02-15 | End: 2022-02-15 | Stop reason: HOSPADM

## 2022-02-14 RX ORDER — GLUCAGON 1 MG
1 KIT INJECTION
Status: DISCONTINUED | OUTPATIENT
Start: 2022-02-14 | End: 2022-02-15 | Stop reason: HOSPADM

## 2022-02-14 RX ORDER — PROPOFOL 10 MG/ML
VIAL (ML) INTRAVENOUS CONTINUOUS PRN
Status: DISCONTINUED | OUTPATIENT
Start: 2022-02-14 | End: 2022-02-14

## 2022-02-14 RX ORDER — OXYCODONE HYDROCHLORIDE 5 MG/1
5 TABLET ORAL
Status: DISCONTINUED | OUTPATIENT
Start: 2022-02-14 | End: 2022-02-15 | Stop reason: HOSPADM

## 2022-02-14 RX ORDER — INSULIN ASPART 100 [IU]/ML
1-10 INJECTION, SOLUTION INTRAVENOUS; SUBCUTANEOUS
Status: DISCONTINUED | OUTPATIENT
Start: 2022-02-14 | End: 2022-02-14

## 2022-02-14 RX ORDER — ALBUTEROL SULFATE 90 UG/1
2 AEROSOL, METERED RESPIRATORY (INHALATION) EVERY 6 HOURS PRN
Status: DISCONTINUED | OUTPATIENT
Start: 2022-02-14 | End: 2022-02-15 | Stop reason: HOSPADM

## 2022-02-14 RX ORDER — TRANEXAMIC ACID 100 MG/ML
1000 INJECTION, SOLUTION INTRAVENOUS
Status: COMPLETED | OUTPATIENT
Start: 2022-02-14 | End: 2022-02-14

## 2022-02-14 RX ORDER — BISACODYL 10 MG
10 SUPPOSITORY, RECTAL RECTAL EVERY 12 HOURS PRN
Status: DISCONTINUED | OUTPATIENT
Start: 2022-02-14 | End: 2022-02-15 | Stop reason: HOSPADM

## 2022-02-14 RX ORDER — FAMOTIDINE 20 MG/1
20 TABLET, FILM COATED ORAL 2 TIMES DAILY
Status: DISCONTINUED | OUTPATIENT
Start: 2022-02-14 | End: 2022-02-15 | Stop reason: HOSPADM

## 2022-02-14 RX ORDER — NALOXONE HCL 0.4 MG/ML
0.02 VIAL (ML) INJECTION
Status: DISCONTINUED | OUTPATIENT
Start: 2022-02-14 | End: 2022-02-15 | Stop reason: HOSPADM

## 2022-02-14 RX ORDER — INSULIN ASPART 100 [IU]/ML
1-10 INJECTION, SOLUTION INTRAVENOUS; SUBCUTANEOUS
Status: DISCONTINUED | OUTPATIENT
Start: 2022-02-14 | End: 2022-02-15 | Stop reason: HOSPADM

## 2022-02-14 RX ORDER — GLUCAGON 1 MG
1 KIT INJECTION
Status: DISCONTINUED | OUTPATIENT
Start: 2022-02-14 | End: 2022-02-14

## 2022-02-14 RX ORDER — CEFAZOLIN SODIUM/WATER 2 G/20 ML
2 SYRINGE (ML) INTRAVENOUS
Status: COMPLETED | OUTPATIENT
Start: 2022-02-14 | End: 2022-02-14

## 2022-02-14 RX ORDER — ALBUTEROL SULFATE 2.5 MG/.5ML
2.5 SOLUTION RESPIRATORY (INHALATION) EVERY 4 HOURS PRN
Status: DISCONTINUED | OUTPATIENT
Start: 2022-02-14 | End: 2022-02-15 | Stop reason: HOSPADM

## 2022-02-14 RX ORDER — IBUPROFEN 200 MG
16 TABLET ORAL
Status: DISCONTINUED | OUTPATIENT
Start: 2022-02-14 | End: 2022-02-15 | Stop reason: HOSPADM

## 2022-02-14 RX ORDER — KETAMINE HCL IN 0.9 % NACL 50 MG/5 ML
SYRINGE (ML) INTRAVENOUS
Status: DISCONTINUED | OUTPATIENT
Start: 2022-02-14 | End: 2022-02-14

## 2022-02-14 RX ORDER — MORPHINE SULFATE 2 MG/ML
2 INJECTION, SOLUTION INTRAMUSCULAR; INTRAVENOUS
Status: DISCONTINUED | OUTPATIENT
Start: 2022-02-14 | End: 2022-02-15 | Stop reason: HOSPADM

## 2022-02-14 RX ORDER — SODIUM CHLORIDE 9 MG/ML
INJECTION, SOLUTION INTRAVENOUS CONTINUOUS
Status: DISCONTINUED | OUTPATIENT
Start: 2022-02-14 | End: 2022-02-15 | Stop reason: HOSPADM

## 2022-02-14 RX ORDER — ACETAMINOPHEN 500 MG
1000 TABLET ORAL
Status: COMPLETED | OUTPATIENT
Start: 2022-02-14 | End: 2022-02-14

## 2022-02-14 RX ORDER — MIDAZOLAM HYDROCHLORIDE 1 MG/ML
4 INJECTION INTRAMUSCULAR; INTRAVENOUS
Status: DISCONTINUED | OUTPATIENT
Start: 2022-02-14 | End: 2022-02-14 | Stop reason: HOSPADM

## 2022-02-14 RX ORDER — IBUPROFEN 200 MG
16 TABLET ORAL
Status: DISCONTINUED | OUTPATIENT
Start: 2022-02-14 | End: 2022-02-14

## 2022-02-14 RX ORDER — CELECOXIB 200 MG/1
400 CAPSULE ORAL
Status: DISCONTINUED | OUTPATIENT
Start: 2022-02-14 | End: 2022-02-14 | Stop reason: HOSPADM

## 2022-02-14 RX ORDER — MUPIROCIN 20 MG/G
1 OINTMENT TOPICAL 2 TIMES DAILY
Status: DISCONTINUED | OUTPATIENT
Start: 2022-02-14 | End: 2022-02-15 | Stop reason: HOSPADM

## 2022-02-14 RX ORDER — PREGABALIN 75 MG/1
75 CAPSULE ORAL NIGHTLY
Status: DISCONTINUED | OUTPATIENT
Start: 2022-02-14 | End: 2022-02-15 | Stop reason: HOSPADM

## 2022-02-14 RX ADMIN — SODIUM CHLORIDE: 0.9 INJECTION, SOLUTION INTRAVENOUS at 08:02

## 2022-02-14 RX ADMIN — PROPOFOL 20 MG: 10 INJECTION, EMULSION INTRAVENOUS at 12:02

## 2022-02-14 RX ADMIN — PROPOFOL 50 MCG/KG/MIN: 10 INJECTION, EMULSION INTRAVENOUS at 12:02

## 2022-02-14 RX ADMIN — INSULIN ASPART 2 UNITS: 100 INJECTION, SOLUTION INTRAVENOUS; SUBCUTANEOUS at 05:02

## 2022-02-14 RX ADMIN — SODIUM CHLORIDE, SODIUM GLUCONATE, SODIUM ACETATE, POTASSIUM CHLORIDE, MAGNESIUM CHLORIDE, SODIUM PHOSPHATE, DIBASIC, AND POTASSIUM PHOSPHATE: .53; .5; .37; .037; .03; .012; .00082 INJECTION, SOLUTION INTRAVENOUS at 01:02

## 2022-02-14 RX ADMIN — FENTANYL CITRATE 25 MCG: 50 INJECTION INTRAMUSCULAR; INTRAVENOUS at 03:02

## 2022-02-14 RX ADMIN — ACETAMINOPHEN 1000 MG: 500 TABLET ORAL at 05:02

## 2022-02-14 RX ADMIN — OXYCODONE 5 MG: 5 TABLET ORAL at 02:02

## 2022-02-14 RX ADMIN — FENTANYL CITRATE 50 MCG: 50 INJECTION, SOLUTION INTRAMUSCULAR; INTRAVENOUS at 12:02

## 2022-02-14 RX ADMIN — METHOCARBAMOL 750 MG: 750 TABLET ORAL at 02:02

## 2022-02-14 RX ADMIN — ACETAMINOPHEN 1000 MG: 500 TABLET ORAL at 08:02

## 2022-02-14 RX ADMIN — LOSARTAN POTASSIUM AND HYDROCHLOROTHIAZIDE 2 TABLET: 12.5; 5 TABLET ORAL at 05:02

## 2022-02-14 RX ADMIN — TRANEXAMIC ACID 1000 MG: 100 INJECTION, SOLUTION INTRAVENOUS at 01:02

## 2022-02-14 RX ADMIN — FAMOTIDINE 20 MG: 10 INJECTION, SOLUTION INTRAVENOUS at 12:02

## 2022-02-14 RX ADMIN — METHOCARBAMOL 750 MG: 750 TABLET ORAL at 08:02

## 2022-02-14 RX ADMIN — Medication 2 G: at 12:02

## 2022-02-14 RX ADMIN — SODIUM CHLORIDE, SODIUM GLUCONATE, SODIUM ACETATE, POTASSIUM CHLORIDE, MAGNESIUM CHLORIDE, SODIUM PHOSPHATE, DIBASIC, AND POTASSIUM PHOSPHATE: .53; .5; .37; .037; .03; .012; .00082 INJECTION, SOLUTION INTRAVENOUS at 12:02

## 2022-02-14 RX ADMIN — FENTANYL CITRATE 25 MCG: 50 INJECTION, SOLUTION INTRAMUSCULAR; INTRAVENOUS at 12:02

## 2022-02-14 RX ADMIN — SODIUM CHLORIDE: 0.9 INJECTION, SOLUTION INTRAVENOUS at 11:02

## 2022-02-14 RX ADMIN — INSULIN ASPART 4 UNITS: 100 INJECTION, SOLUTION INTRAVENOUS; SUBCUTANEOUS at 09:02

## 2022-02-14 RX ADMIN — PREGABALIN 75 MG: 75 CAPSULE ORAL at 08:02

## 2022-02-14 RX ADMIN — OXYMETAZOLINE HCL 2 ML: 0.05 SPRAY NASAL at 12:02

## 2022-02-14 RX ADMIN — VANCOMYCIN HYDROCHLORIDE 1500 MG: 1.5 INJECTION, POWDER, LYOPHILIZED, FOR SOLUTION INTRAVENOUS at 08:02

## 2022-02-14 RX ADMIN — TRANEXAMIC ACID 1000 MG: 100 INJECTION, SOLUTION INTRAVENOUS at 12:02

## 2022-02-14 RX ADMIN — FENTANYL CITRATE 25 MCG: 50 INJECTION INTRAMUSCULAR; INTRAVENOUS at 02:02

## 2022-02-14 RX ADMIN — MIDAZOLAM HYDROCHLORIDE 1 MG: 1 INJECTION, SOLUTION INTRAMUSCULAR; INTRAVENOUS at 12:02

## 2022-02-14 RX ADMIN — SENNOSIDES AND DOCUSATE SODIUM 1 TABLET: 50; 8.6 TABLET ORAL at 08:02

## 2022-02-14 RX ADMIN — MEPIVACAINE HYDROCHLORIDE 3 ML: 15 INJECTION, SOLUTION EPIDURAL; INFILTRATION at 12:02

## 2022-02-14 RX ADMIN — LIDOCAINE HYDROCHLORIDE 50 MG: 20 INJECTION, SOLUTION INTRAVENOUS at 12:02

## 2022-02-14 RX ADMIN — Medication 2 G: at 08:02

## 2022-02-14 RX ADMIN — MUPIROCIN 1 G: 20 OINTMENT TOPICAL at 08:02

## 2022-02-14 RX ADMIN — SODIUM CHLORIDE: 0.9 INJECTION, SOLUTION INTRAVENOUS at 02:02

## 2022-02-14 RX ADMIN — OXYCODONE 5 MG: 5 TABLET ORAL at 06:02

## 2022-02-14 RX ADMIN — ASPIRIN 81 MG: 81 TABLET, COATED ORAL at 08:02

## 2022-02-14 RX ADMIN — Medication 10 MG: at 12:02

## 2022-02-14 RX ADMIN — FAMOTIDINE 20 MG: 20 TABLET, FILM COATED ORAL at 08:02

## 2022-02-14 RX ADMIN — DEXAMETHASONE SODIUM PHOSPHATE 8 MG: 4 INJECTION, SOLUTION INTRAMUSCULAR; INTRAVENOUS at 12:02

## 2022-02-14 NOTE — ANESTHESIA POSTPROCEDURE EVALUATION
Anesthesia Post Evaluation    Patient: Abigail Pierre    Procedure(s) Performed: Procedure(s) (LRB):  ARTHROPLASTY, KNEE, TOTAL: LEFT: DEPUY-SIGMA (Left)    Final Anesthesia Type: spinal      Patient location during evaluation: PACU  Patient participation: Yes- Able to Participate  Level of consciousness: awake and alert  Post-procedure vital signs: reviewed and stable  Pain management: adequate  Airway patency: patent    PONV status at discharge: No PONV  Anesthetic complications: no      Cardiovascular status: blood pressure returned to baseline  Respiratory status: unassisted            Vitals Value Taken Time   /74 02/14/22 1546   Temp 36.5 °C (97.7 °F) 02/14/22 1601   Pulse 93 02/14/22 1601   Resp 45 02/14/22 1601   SpO2 100 % 02/14/22 1600   Vitals shown include unvalidated device data.      Event Time   Out of Recovery 16:02:33         Pain/Owen Score: Pain Rating Prior to Med Admin: 9 (2/14/2022  3:14 PM)  Pain Rating Post Med Admin: 8 (2/14/2022  3:54 PM)  Owen Score: 10 (2/14/2022  2:54 PM)

## 2022-02-14 NOTE — PLAN OF CARE
Pre op complete. Patient resting comfortably. Call light in reach. Friend at bedside, some belongings in locker. Patient has walker for discharge/home use. Patient blood sugar of 181.

## 2022-02-14 NOTE — PT/OT/SLP EVAL
Physical Therapy Evaluation and Treatment     Patient Name:  Abigail Pierre   MRN:  [id number]    Recommendations:     Discharge Recommendations:  outpatient PT   Discharge Equipment Recommendations: none   Barriers to discharge: Decreased caregiver assistance- lives alone but her 2 daughters will be checking in on her    Assessment:     Abigail Pierre is a 72 y.o. female admitted with a medical diagnosis of Primary osteoarthritis of left knee.  She presents with the following impairments/functional limitations:  weakness,impaired functional mobilty,gait instability,impaired balance,decreased lower extremity function,pain,decreased ROM,impaired skin,orthopedic precautions. Patient tolerated PT session well. Patient ambulated 90ft with RW and contact guard assistance. No LOB or SOB noted. Patient has an OPPT appointment on 2/16/2022.     Rehab Prognosis: Good; patient would benefit from acute skilled PT services to address these deficits and reach maximum level of function.    Recent Surgery: Procedure(s) (LRB):  ARTHROPLASTY, KNEE, TOTAL: LEFT: DEPUY-SIGMA (Left) Day of Surgery    Plan:     During this hospitalization, patient to be seen daily to address the identified rehab impairments via gait training,therapeutic activities,therapeutic exercises and progress toward the following goals:    · Plan of Care Expires:  02/18/22    Subjective     Chief Complaint: Pain in left knee.   Patient/Family Comments/goals: To walk without pain.   Pain/Comfort:  · Pain Rating 1: 7/10  · Location - Side 1: Left  · Location 1: knee  · Pain Addressed 1: Pre-medicate for activity,Reposition,Distraction    Patients cultural, spiritual, Muslim conflicts given the current situation:  n/a    Living Environment:  Patient lives alone in a [facility] with no steps to enter. Prior to admission, patients level of function was independent for functional mobility. Equipment at home: bedside commode,walker, rolling,shower chair.Upon discharge,  patient will have assistance from 2 daughters who will be checking in on her.    Objective:     Communicated with RN prior to session.  Patient found up in chair with peripheral IV,cryotherapy,FCD upon PT entry to room. Daughter present in room.     General Precautions: Standard, fall   Orthopedic Precautions:LLE weight bearing as tolerated   Braces: N/A    Exams:  · Cognitive Exam:  Patient is oriented to Person, Place, Time and Situation  · Gross Motor Coordination:  WFL  · Sensation:    · -       Intact  · RLE ROM: WFL  · RLE Strength: WFL  · LLE ROM: WFL except limited at knee due to pain  · LLE Strength: appears WFL but did not formally assess due to pain    Functional Mobility:  · Transfers:     · Sit to Stand:  contact guard assistance with rolling walker x1 from bedside chair   · Gait:  Patient ambulated 90ft with Rolling Walker and contact guard assistance  using 3-point gait. Patient demonstrated decreased chitra and decreased step length during gait due to pain, decreased ROM and decreased strength.    Therapeutic Activities and Exercises:  Patient and daughter educated in:  -PT role and POC  -safety with transfers including hand placement  -gait sequencing and RW management  -OOB activity to maximize recovery including ambulating with nursing staff assistance and RW while in the hospital       AM-PAC 6 CLICK MOBILITY  Total Score:17     Patient left up in chair with all lines intact, call button in reach, RN notified and daughter present.    GOALS:   Multidisciplinary Problems     Physical Therapy Goals        Problem: Physical Therapy Goal    Goal Priority Disciplines Outcome Goal Variances Interventions   Physical Therapy Goal     PT, PT/OT Ongoing, Progressing     Description: Goals to be met by: 2022    Patient will increase functional independence with mobility by performin. Supine to sit with supervision  2. Sit to stand transfer with Supervision  3. Gait x300 feet with Supervision  using Rolling Walker  4. Ascend/Descend 1 curb step with Stand by assistance using Rolling Walker  5. Lower extremity exercise program x30 reps per handout, with supervision                         History:     Past Medical History:   Diagnosis Date    Arthritis     Asthma     Carpal tunnel syndrome 1/2/2014    COPD (chronic obstructive pulmonary disease)     Diabetes mellitus type II, uncontrolled 1/23/2017    Diabetes mellitus, type 2     DJD (degenerative joint disease) of knee 1/2/2014    Ductal carcinoma in situ (DCIS) of left breast 12/22/2016    GERD (gastroesophageal reflux disease)     History of colonic polyps 1/23/2017    Around 2015    Hyperlipidemia     Hypertension     Intrinsic asthma, unspecified 1/2/2014    Kidney stones 1/23/2017    On CT done by GI in 2015 -sent to Dr Sargent?    Periumbilical abdominal pain 1/23/2017    Seen by Cristhian Mena 10/15 -ct, colon and EGD (normal EGD)       Past Surgical History:   Procedure Laterality Date    BREAST SURGERY      CHOLECYSTECTOMY      COLONOSCOPY      CYSTOSCOPY W/ LASER LITHOTRIPSY         Time Tracking:     PT Received On: 02/14/22  PT Start Time: 1632     PT Stop Time: 1648  PT Total Time (min): 16 min     Billable Minutes: Evaluation 8 and Gait Training 8      02/14/2022

## 2022-02-14 NOTE — TRANSFER OF CARE
"Anesthesia Transfer of Care Note    Patient: Abigail Pierre    Procedure(s) Performed: Procedure(s) (LRB):  ARTHROPLASTY, KNEE, TOTAL: LEFT: DEPUY-SIGMA (Left)    Patient location: PACU    Anesthesia Type: spinal    Transport from OR: Transported from OR on 6-10 L/min O2 by face mask with adequate spontaneous ventilation    Post pain: adequate analgesia    Post assessment: no apparent anesthetic complications    Post vital signs: stable    Level of consciousness: awake    Nausea/Vomiting: no nausea/vomiting    Complications: none    Transfer of care protocol was followed      Last vitals:   Visit Vitals  /69   Pulse 101   Temp 36.8 °C (98.3 °F) (Oral)   Resp 15   Ht 5' 3" (1.6 m)   Wt 92.5 kg (204 lb)   LMP  (Approximate)   SpO2 100%   Breastfeeding No   BMI 36.14 kg/m²     "

## 2022-02-14 NOTE — PLAN OF CARE
VSS. Pt able to tolerate oral liquids. Pt reports tolerable pain level for transfer. Polar care and dressing intact. TEDs and FCDs intact. IVF infusing. Polar care power adapter placed with pts personal belongings at the bedside. Pt has walker for home use. Pt to be transferred via bed to recovery suites. Pt stable for transfer. No distress noted.

## 2022-02-14 NOTE — PLAN OF CARE
Patient tolerated PT session well. Patient ambulated 90ft with RW and contact guard assistance. No LOB or SOB noted. Patient has an OPPT appointment on 2022.       Problem: Physical Therapy Goal  Goal: Physical Therapy Goal  Description: Goals to be met by: 2022    Patient will increase functional independence with mobility by performin. Supine to sit with supervision  2. Sit to stand transfer with Supervision  3. Gait x300 feet with Supervision using Rolling Walker  4. Ascend/Descend 1 curb step with Stand by assistance using Rolling Walker  5. Lower extremity exercise program x30 reps per handout, with supervision        Outcome: Ongoing, Progressing

## 2022-02-14 NOTE — ANESTHESIA PROCEDURE NOTES
Spinal    Diagnosis: left knee pain  Patient location during procedure: OR  Start time: 2/14/2022 12:19 PM  Timeout: 2/14/2022 12:17 PM  End time: 2/14/2022 12:23 PM    Staffing  Authorizing Provider: Flavio Lovell MD  Performing Provider: Flavio Lovell MD    Preanesthetic Checklist  Completed: patient identified, IV checked, site marked, risks and benefits discussed, surgical consent, monitors and equipment checked, pre-op evaluation and timeout performed  Spinal Block  Patient position: sitting  Prep: ChloraPrep  Patient monitoring: heart rate, continuous pulse ox and frequent blood pressure checks  Approach: midline  Location: L3-4  Injection technique: single shot  CSF Fluid: clear free-flowing CSF  Needle  Needle type: Shanna   Needle gauge: 25 G  Needle length: 3.5 in  Additional Documentation: incremental injection, negative aspiration for heme and no paresthesia on injection  Needle localization: anatomical landmarks  Assessment  Sensory level: T6   Dermatomal levels determined by alcohol wipe  Ease of block: easy  Patient's tolerance of the procedure: comfortable throughout block and no complaints  Medications:  Medication Administration Time: 2/14/2022 12:22 PM  Medications: mepivacaine (CARBOCAINE) injection 15 mg/mL (1.5%), 3 mL

## 2022-02-14 NOTE — PLAN OF CARE
Problem: Occupational Therapy Goal  Goal: Occupational Therapy Goal  Description: Goals to be met by: 2/18/22    Patient will increase functional independence with ADLs by performing:    UE Dressing with Modified [place].  LE Dressing with Supervision.  Grooming while standing at sink with Modified [place].  Toileting from toilet with Modified [place] for hygiene and clothing management.   Toilet transfer to toilet with Modified [place].    Outcome: Ongoing, Progressing    OT evaluation with goals established. Patient completed bed mobility at stand by assistance. She completed toilet transfer and grooming task in standing at contact guard assistance.

## 2022-02-14 NOTE — PT/OT/SLP EVAL
"Occupational Therapy   Evaluation    Name: Abigail Pierre  MRN: [id number]  Admitting Diagnosis:  Primary osteoarthritis of left knee Day of Surgery    Recommendations:     Discharge Recommendations: home  Discharge Equipment Recommendations:  none  Barriers to discharge:  None    Assessment:     Abigail Pierre is a 72 y.o. female with a medical diagnosis of Primary osteoarthritis of left knee.  She presents s/p left TKA. Patient completed bed mobility at stand by assistance. She completed toilet transfer and grooming task in standing at contact guard assistance. Patient would benefit from skilled OT needs s/p left TKA to increase independence with ADLs and ADL transfers. Performance deficits affecting function: weakness,impaired functional mobilty,gait instability,impaired balance,impaired self care skills,decreased lower extremity function,decreased ROM.      Rehab Prognosis: Good; patient would benefit from acute skilled OT services to address these deficits and reach maximum level of function.       Plan:     Patient to be seen daily to address the above listed problems via self-care/home management,therapeutic activities,therapeutic exercises  · Plan of Care Expires: 02/18/22  · Plan of Care Reviewed with: patient,daughter    Subjective     Chief Complaint: "pain in knee"   Patient/Family Comments/goals: "get back to cleaning and cooking"     Occupational Profile:  Living Environment: Patient lives alone in 1 level home with 0 steps to enter and has a tub/shower combo   Previous level of function: independent with ADLs   Roles and Routines: Retired   Equipment Used at Home:  bedside commode,walker, rolling,shower chair  Assistance upon Discharge: daughters     Pain/Comfort:  · Pain Rating 1: 7/10  · Location - Side 1: Left  · Location - Orientation 1: generalized  · Location 1: knee  · Pain Addressed 1: Pre-medicate for activity,Reposition,Distraction    Patients cultural, spiritual, Jehovah's witness conflicts given the " current situation: no    Objective:     Communicated with: Nursing prior to session.  Patient found supine with cryotherapy,FCD,peripheral IV upon OT entry to room.    General Precautions: Standard, fall   Orthopedic Precautions:LLE weight bearing as tolerated   Braces: N/A     Occupational Performance:    Bed Mobility:    · Patient completed Scooting/Bridging with stand by assistance  · Patient completed Supine to Sit with stand by assistance    Functional Mobility/Transfers:  · Patient completed Sit <> Stand Transfer with contact guard assistance  with  rolling walker   · Patient completed Toilet Transfer Step Transfer technique with contact guard assistance with  rolling walker   · Patient completed chair transfer with step transfer technique with contact guard assistance with rolling walker   · Functional Mobility: patient ambulated to/from bathroom with use of rolling walker at contact guard assistance     Activities of Daily Living:  · Grooming: contact guard assistance standing at sink to wash hands   · Upper Body Dressing: minimum assistance sitting edge of bed to don gown for back, IV in   · Toileting: contact guard assistance completed on bedside commode placed over toilet: unable to void     Cognitive/Visual Perceptual:  Cognitive/Psychosocial Skills:     -       Oriented to: Person, Place and Time   -       Follows Commands/attention:Follows multistep  commands    Physical Exam:  Upper Extremity Range of Motion:     -       Right Upper Extremity: WFL  -       Left Upper Extremity: WFL  Upper Extremity Strength:    -       Right Upper Extremity: WFL  -       Left Upper Extremity: WFL    AMPAC 6 Click ADL:  AMPAC Total Score: 19    Treatment & Education:  Patient educated on hand placement for transfers    Patient educated on rolling walker placement for ADLs  Patient educated on polar ice use   Patient educated on role OT and plan of care s/p surgery at [facility], white board updated as  needed       Patient left up in chair with direct handoff to physical therapy with IV intact, RN notified and daughter present     GOALS:   Multidisciplinary Problems     Occupational Therapy Goals        Problem: Occupational Therapy Goal    Goal Priority Disciplines Outcome Interventions   Occupational Therapy Goal     OT, PT/OT Ongoing, Progressing    Description: Goals to be met by: 2/18/22    Patient will increase functional independence with ADLs by performing:    UE Dressing with Modified [place].  LE Dressing with Supervision.  Grooming while standing at sink with Modified [place].  Toileting from toilet with Modified [place] for hygiene and clothing management.   Toilet transfer to toilet with Modified [place].                     History:     Past Medical History:   Diagnosis Date    Arthritis     Asthma     Carpal tunnel syndrome 1/2/2014    COPD (chronic obstructive pulmonary disease)     Diabetes mellitus type II, uncontrolled 1/23/2017    Diabetes mellitus, type 2     DJD (degenerative joint disease) of knee 1/2/2014    Ductal carcinoma in situ (DCIS) of left breast 12/22/2016    GERD (gastroesophageal reflux disease)     History of colonic polyps 1/23/2017    Around 2015    Hyperlipidemia     Hypertension     Intrinsic asthma, unspecified 1/2/2014    Kidney stones 1/23/2017    On CT done by GI in 2015 -sent to Dr Sargent?    Periumbilical abdominal pain 1/23/2017    Seen by Cristhian Mena 10/15 -ct, colon and EGD (normal EGD)       Past Surgical History:   Procedure Laterality Date    BREAST SURGERY      CHOLECYSTECTOMY      COLONOSCOPY      CYSTOSCOPY W/ LASER LITHOTRIPSY         Time Tracking:     OT Date of Treatment: 02/14/22  OT Start Time: 1616  OT Stop Time: 1634  OT Total Time (min): 18 min    Billable Minutes:Evaluation 10  Self Care/Home Management 8    Annalee French OT  2/14/2022

## 2022-02-14 NOTE — NURSING
Pt arrived to unit via hospital bed from PACU.  Pt aaox4, vss, no s/s of distress noted.  Pt states pain to left knee 6/10.  Dressing cdi.  Polar Ice in place.  Pt instructed to call for assistance when getting up and she verbalized understanding.  Call light placed within reach.

## 2022-02-14 NOTE — OP NOTE
[facility] Left TKA  OPERATIVE NOTE    Date of Operation:   2/14/2022    Providers Performing:   Surgeon(s):  Steven Kapadia III, MD  Assistant: Harry Cadet MD    Operative Procedure:   Left Total Knee Arthroplasty, CPT 94089    Preoperative Diagnosis:   Left Knee Osteoarthritis, ICD-10 M17.12    Postoperative Diagnosis:   SAME    Components Used:   Depuy  Femur: Sigma PS Size 2.5  Tibia: Sigma fixed bearing PS Size 2.5  Patella: Sigma Oval Dome 35 mm  Tibial Insert: Sigma fixed bearing PS inset size 10 mm  2 packs cement: Simplex    Implant Name Type Inv. Item Serial No.  Lot No. LRB No. Used Action   CEMENT BONE SURG SMPLX P RADPQ - [id number]  CEMENT BONE SURG SMPLX P RADPQ  [employer]. [id number] Left 1 Implanted   TRAY TIBIAL CEMENTED 2.5 COBAL - [id number]  TRAY TIBIAL CEMENTED 2.5 COBAL  [employer] [id number] Left 1 Implanted   COMPONENT FEM POST SZ 2.5 LEF - [id number]  COMPONENT FEM POST SZ 2.5 LEF  [employer] [id number] Left 1 Implanted   PATELLA OVAL DOME 35MM - [id number]  PATELLA OVAL DOME 35MM  [employer] [id number] Left 1 Implanted   INSERT TIBIAL SZ 2.5 10MM PFC - [id number]  INSERT TIBIAL SZ 2.5 10MM PFC  [employer] [id number] Left 1 Implanted       Anesthesia:   Spinal    ALPESH cocktail: ALICIA    Estimated Blood Loss:   350 cc    Specimens:   None    Complications:   None    Indications:   The patient has failed non-operative measures including medications, injections and activity modifications for their knee.  After an explanation of risks and benefits of knee arthroplasty surgery, the patient wishes to proceed with surgery.    Operative Notes:   The patient was greeted in the pre-op holding area.  The patients knee was marked with a surgical marker by the surgeon.  Spinal-Epidural anesthesia was administered by the anesthesia team.  The patient was placed in the supine position on the OR table with all bony prominences padded.  A tourniquet was not used.  IV antibiotics were administered.   The leg was prepped and draped in the usual sterile fashion.  A timeout was performed and the correct patient, site and procedure were identified.    A midline incision was made with a standard medical parapatellar arthrotomy.  The standard medial capsular release was performed along with the lateral gutter.  The patella was mobilized from the lateral parapatellar ligament and bony osteophytes were removed.  The intercondylar notch was cleared of the ACL/PCL.    The extramedullary tibial alignment guide was placed in the appropriate slope and varus/valgus orientation and the proximal cutting block secured by pins.  Measured resection with a 10mm cut was accounted for from the high side of the plateau, perpendicular to the ankle.  The cut was made with an oscillating saw.  We then obtained access to the femoral canal with the stepped drill.  The intramedullary bhakti was placed in 5 degrees of valgus with a 9mm planned resection.  Our distal femoral cuts were made.  The knee was placed in extension and the medical and lateral meniscal remnants removed.  A spacer block was placed with the knee in extension checking for alignment and balance which we were happy with.    The knee was then brought into flexion and the distal femoral gap assessed for appropriate rotation.  Our distal femoral sizing guide was placed and sized appropriately.  Guide pins were placed in the appropriate external rotation.  This was assessed with the 4 in 1 cutting block and the flexion gap sizing block which was appropriate.  The distal femoral preparation was completed after the anterior, posterior and chamfer cuts were made.  The box cutting guide was placed and assessed.  The box cut was made.    At this time the trial implants were placed and knee assessed for stability, and flexion arc. The patella was prepared using free-hand technique and the three-holed lug drill guide was sized and appropriately assessed. Patella tracking was found to be  acceptable. The tibial component rotation was marked. The trials were removed. The tibial component was positioned and the keel was drilled and punched.    The wound was copiously irrigated with pulsitile lavage and the bony surfaces dried.  Cement was mixed on the back table and applied to all components.  Cementation of the femoral, tibial and patellar components was performed sequentially with removal of all excess cement. A trial insert was placed to provide compression while the cement dried and a 0.35% betadine solution was left to soak in the surgical field.     After the cement was dry, the trial poly insert was removed. Hemostasis was obtained with bovie electrocautery.  The knee was again copiously irrigated with pulsatile lavage. The final polyethylene insert was placed and the knee reduced.      1 gram of vancomycin powder was placed in the knee. The arthrotomy was closed with interrupted #1 vicryl suture and #2 quill, the subcuticular layer was closed with 2-0 vicryl suture and a running 4-0 monocryl was used to closed the skin surface.  Surgicel sealant was placed over the top of the incision and sterile dressing placed over the wound. Appropriately sized TEDs hose were placed for edema control.     Sponge and needle counts were correct.    The first-assistant was critical to all steps of the operation, including retraction and leg stabilization during exposure and bone preparation, as well as the deep and superficial wound closure.  Dr. Kapadia performed and/or supervised the key portions of this surgical procedure, including evaluation of the bone cuts, reshaping of the bony elements, and insertion of the provisional and final components.    Postoperative Plan:  The patient will be anticoagulated with 81mg aspirin twice daily for one month.   They will receive 24 hours of post-operative antibiotics.    Activity will be weight bearing as tolerated.    Due patient and or surgical factors the patient will  receive an Rx for cefadroxil 500mg BID x7 days after discharge per [place] data:   John MM, Isela JE, Nia M, Chan JACQUES. The [facility] Clinical Research Award: Extended Oral Antibiotics Prevent Periprosthetic Joint Infection in High-Risk Cases: 3855 Patients With 1-Year Follow-Up. J Arthroplasty. 2021 Jul;36(7S):S18-S25. doi: 10.1016/j.arth.2021.01.051. Epub 2021 Jan 23. PMID: [id number].      Signed by: Steven Kapadia III, MD

## 2022-02-15 VITALS
HEART RATE: 102 BPM | TEMPERATURE: 98 F | WEIGHT: 204 LBS | SYSTOLIC BLOOD PRESSURE: 184 MMHG | RESPIRATION RATE: 18 BRPM | BODY MASS INDEX: 36.14 KG/M2 | OXYGEN SATURATION: 98 % | HEIGHT: 63 IN | DIASTOLIC BLOOD PRESSURE: 88 MMHG

## 2022-02-15 LAB
POCT GLUCOSE: 295 MG/DL (ref 70–110)
POCT GLUCOSE: 337 MG/DL (ref 70–110)
POCT GLUCOSE: 377 MG/DL (ref 70–110)

## 2022-02-15 PROCEDURE — 25000242 PHARM REV CODE 250 ALT 637 W/ HCPCS: Performed by: ANESTHESIOLOGY

## 2022-02-15 PROCEDURE — 25000003 PHARM REV CODE 250: Performed by: NURSE PRACTITIONER

## 2022-02-15 PROCEDURE — 97110 THERAPEUTIC EXERCISES: CPT

## 2022-02-15 PROCEDURE — 99900035 HC TECH TIME PER 15 MIN (STAT)

## 2022-02-15 PROCEDURE — 25000003 PHARM REV CODE 250: Performed by: ANESTHESIOLOGY

## 2022-02-15 PROCEDURE — 99499 NO LOS: ICD-10-PCS | Mod: ,,, | Performed by: ANESTHESIOLOGY

## 2022-02-15 PROCEDURE — 97116 GAIT TRAINING THERAPY: CPT

## 2022-02-15 PROCEDURE — 97535 SELF CARE MNGMENT TRAINING: CPT

## 2022-02-15 PROCEDURE — 25000003 PHARM REV CODE 250: Performed by: STUDENT IN AN ORGANIZED HEALTH CARE EDUCATION/TRAINING PROGRAM

## 2022-02-15 PROCEDURE — 94761 N-INVAS EAR/PLS OXIMETRY MLT: CPT

## 2022-02-15 PROCEDURE — 99499 UNLISTED E&M SERVICE: CPT | Mod: ,,, | Performed by: ANESTHESIOLOGY

## 2022-02-15 PROCEDURE — 63600175 PHARM REV CODE 636 W HCPCS: Performed by: ANESTHESIOLOGY

## 2022-02-15 RX ORDER — CELECOXIB 200 MG/1
200 CAPSULE ORAL DAILY
Status: DISCONTINUED | OUTPATIENT
Start: 2022-02-15 | End: 2022-02-15 | Stop reason: HOSPADM

## 2022-02-15 RX ORDER — LOSARTAN POTASSIUM 25 MG/1
25 TABLET ORAL ONCE
Status: COMPLETED | OUTPATIENT
Start: 2022-02-15 | End: 2022-02-15

## 2022-02-15 RX ORDER — METFORMIN HYDROCHLORIDE 500 MG/1
500 TABLET ORAL 2 TIMES DAILY WITH MEALS
Status: DISCONTINUED | OUTPATIENT
Start: 2022-02-15 | End: 2022-02-15 | Stop reason: HOSPADM

## 2022-02-15 RX ADMIN — CEFADROXIL 500 MG: 500 CAPSULE ORAL at 09:02

## 2022-02-15 RX ADMIN — METFORMIN HYDROCHLORIDE 500 MG: 500 TABLET ORAL at 10:02

## 2022-02-15 RX ADMIN — SENNOSIDES AND DOCUSATE SODIUM 1 TABLET: 50; 8.6 TABLET ORAL at 09:02

## 2022-02-15 RX ADMIN — Medication 2 G: at 04:02

## 2022-02-15 RX ADMIN — INSULIN ASPART 10 UNITS: 100 INJECTION, SOLUTION INTRAVENOUS; SUBCUTANEOUS at 06:02

## 2022-02-15 RX ADMIN — POLYETHYLENE GLYCOL 3350 17 G: 17 POWDER, FOR SOLUTION ORAL at 09:02

## 2022-02-15 RX ADMIN — MUPIROCIN 1 G: 20 OINTMENT TOPICAL at 09:02

## 2022-02-15 RX ADMIN — ASPIRIN 81 MG: 81 TABLET, COATED ORAL at 09:02

## 2022-02-15 RX ADMIN — LOSARTAN POTASSIUM AND HYDROCHLOROTHIAZIDE 2 TABLET: 12.5; 5 TABLET ORAL at 09:02

## 2022-02-15 RX ADMIN — FLUTICASONE PROPIONATE 50 MCG: 50 SPRAY, METERED NASAL at 12:02

## 2022-02-15 RX ADMIN — FLUTICASONE PROPIONATE 50 MCG: 50 SPRAY, METERED NASAL at 09:02

## 2022-02-15 RX ADMIN — OXYCODONE 5 MG: 5 TABLET ORAL at 07:02

## 2022-02-15 RX ADMIN — FAMOTIDINE 20 MG: 20 TABLET, FILM COATED ORAL at 09:02

## 2022-02-15 RX ADMIN — GLIPIZIDE 5 MG: 5 TABLET ORAL at 07:02

## 2022-02-15 RX ADMIN — OXYCODONE 5 MG: 5 TABLET ORAL at 12:02

## 2022-02-15 RX ADMIN — ACETAMINOPHEN 1000 MG: 500 TABLET ORAL at 12:02

## 2022-02-15 RX ADMIN — METHOCARBAMOL 750 MG: 750 TABLET ORAL at 09:02

## 2022-02-15 RX ADMIN — CELECOXIB 200 MG: 200 CAPSULE ORAL at 09:02

## 2022-02-15 RX ADMIN — LOSARTAN POTASSIUM 25 MG: 25 TABLET, FILM COATED ORAL at 01:02

## 2022-02-15 RX ADMIN — ACETAMINOPHEN 1000 MG: 500 TABLET ORAL at 06:02

## 2022-02-15 RX ADMIN — INSULIN HUMAN 4 UNITS: 100 INJECTION, SOLUTION PARENTERAL at 10:02

## 2022-02-15 NOTE — NURSING
Provided patient with pain scale card. Patient/family educated on the use of pain scale card. Patient/family educated on s/s of side effects of new medications. Patient/family verbalized understanding.

## 2022-02-15 NOTE — PLAN OF CARE
02/14/22 1839   TAYLOR Message   Medicare Outpatient and Observation Notification regarding financial responsibility Given to patient/caregiver;Explained to patient/caregiver;Signed/date by patient/caregiver   Date TAYLOR was signed 02/14/22   Time TAYLOR was signed [id number]

## 2022-02-15 NOTE — PROGRESS NOTES
[facility])  Orthopedics  Progress Note    Patient Name: Abigail Pierre  MRN: [id number]  Admission Date: 2/14/2022  Hospital Length of Stay: 0 days  Attending Provider: Steven Kapadia III, MD  Primary Care Provider: Danilo Rangel MD  Follow-up For: Procedure(s) (LRB):  ARTHROPLASTY, KNEE, TOTAL: LEFT: DEPUY-SIGMA (Left)    Post-Operative Day: 1 Day Post-Op  Subjective:     Principal Problem:Primary osteoarthritis of left knee    Principal Orthopedic Problem: same    Interval History: Patient seen and examined at bedside. Vital signs stable except for elevated Bp. Systolic 140s-150s. Glucose this am between 340-370. Discussed glucose with patient and noted the valentines day chocolate at bedside she reports that one of her relatives gave her the chocolate, and she confessed to eating several pieces. Per nursing chart review, patient also had a snack last night of a sandwich. Per standard protocol, patient was only given 1/2 dose of the SSI since this was in the evening time. The nurse contacted the Nurse practicioner who said to only give the 1/2 dose. So only 4 units of novolog were administered. This am, the glucose was quite elevated in the mid 300s. Her glipizide is due at 0700 this am. Pain controlled.  Walked 90 feet with PT yesterday.      Review of patient's allergies indicates:   Allergen Reactions    Sulfa (sulfonamide antibiotics) Itching and Rash       Current Facility-Administered Medications   Medication    0.9%  NaCl infusion    acetaminophen tablet 1,000 mg    albuterol inhaler 2 puff    albuterol sulfate nebulizer solution 2.5 mg    aspirin EC tablet 81 mg    bisacodyL suppository 10 mg    cefadroxil capsule 500 mg    celecoxib capsule 200 mg    dextrose 10% bolus 125 mL    dextrose 10% bolus 250 mL    famotidine tablet 20 mg    fluticasone propionate 50 mcg/actuation nasal spray 50 mcg    glipiZIDE tablet 5 mg    glucagon (human recombinant) injection 1 mg     "glucose chewable tablet 16 g    glucose chewable tablet 24 g    insulin aspart U-100 pen 1-10 Units    losartan-hydrochlorothiazide 50-12.5 mg per tablet 2 tablet    methocarbamoL tablet 750 mg    morphine injection 2 mg    mupirocin 2 % ointment 1 g    naloxone 0.4 mg/mL injection 0.02 mg    ondansetron injection 4 mg    oxyCODONE immediate release tablet 5 mg    oxyCODONE immediate release tablet Tab 10 mg    polyethylene glycol packet 17 g    pregabalin capsule 75 mg    prochlorperazine injection Soln 5 mg    ROPIvacaine (PF) 2 mg/ml (0.2%) solution    senna-docusate 8.6-50 mg per tablet 1 tablet    sodium chloride 0.65 % nasal spray 2 spray     Objective:     Vital Signs (Most Recent):  Temp: 97.6 °F (36.4 °C) (02/15/22 0420)  Pulse: 96 (02/15/22 0420)  Resp: 18 (02/15/22 0420)  BP: 129/60 (02/15/22 0420)  SpO2: 96 % (02/15/22 0420) Vital Signs (24h Range):  Temp:  [97.1 °F (36.2 °C)-98.3 °F (36.8 °C)] 97.6 °F (36.4 °C)  Pulse:  [] 96  Resp:  [12-20] 18  SpO2:  [95 %-100 %] 96 %  BP: (123-158)/(59-76) 129/60     Weight: 92.5 kg (204 lb)  Height: 5' 3" (160 cm)  Body mass index is 36.14 kg/m².      Intake/Output Summary (Last 24 hours) at 2/15/2022 0621  Last data filed at 2/15/2022 0420  Gross per 24 hour   Intake 2900 ml   Output 1700 ml   Net 1200 ml       Ortho/SPM Exam  AAOx4  NAD  Reg rate  No increased WOB  Dressing c/d/i  Polar ice in place  SILT T/SP/DP/Walter/Sa  Motor intact T/SP/DP  WWP extremities  FCDs in place and functioning    Significant Labs: None    Significant Imaging: I have reviewed all pertinent imaging results/findings.   TKA hardware in good position, no fracture    Assessment/Plan:     * Primary osteoarthritis of left knee  72 y.o. female POD1 s/p Left TKA 2.14.22    Pain control: multimodal  PT/OT: WBAT LLE with walker  DVT PPx: ASA 81 BID, FCDs at all times when not ambulating  Abx: postop Ancef complete, cefadroxil ordered at 9 am today  Labs: None  Drain: " None  Ladi: None    Dispo: Strict glucose control. Spoke with nurse this am, instructed to give 10 units of novolog this am with glucose at 370. Also, encouraged patient to avoid sweets and emphasized the importance of glucose control in jacquelyn op period. Glipizide scheduled at 700 this am. Will discuss again with anesthesia team for additional recommendations on glucose management. Otherwise, patient doing very well. Will work with therapy this am, assess glucose, and evaluate for DC this am.       Diabetes mellitus type II, uncontrolled  Glucose 350-370. SSI, gipizide. Will discuss again with anesthesia team regarding management.     HTN (hypertension)  Home meds          Raffy Cadet MD  Orthopedics  [facility] ([facility])

## 2022-02-15 NOTE — DISCHARGE SUMMARY
[facility])  Orthopedics  Discharge Summary      Patient Name: Abigail Pierre  MRN: [id number]  Admission Date: 2/14/2022  Hospital Length of Stay: 0 days  Discharge Date and Time:  02/15/2022 7:09 AM  Attending Physician: Steven Kapadia III, MD   Discharging Provider: Raffy Cadet MD  Primary Care Provider: Danilo Rangel MD    HPI:Abigail Pierre is a 71 y.o. female with history of Left knee pain. Pain is worse with activity and weight bearing.  Patient has experienced interference of activities of daily living due to decreased range of motion and an increase in joint pain and swelling.  Patient has failed non-operative treatment including NSAIDs, corticosteroid injections, viscosupplement injections, and activity modification.  Abigail Pierre currently ambulates independently.      Relevant medical conditions of significance in perioperative period:  Asthma- on medication managed by pulmonary  HTN- on medication managed by pcp  DM- on medication managed by pcp     Procedure(s) (LRB):  ARTHROPLASTY, KNEE, TOTAL: LEFT: DEPUY-SIGMA (Left)      Hospital Course: the patient arrived to pre-op area for proper pre-operative management.  Upon completion of pre-operative preparation, the patient was taken back to the operative theatre.  A Left TKA was performed without complication and the patient was transported to the post anesthesia care unit in stable condition. After appropriate recovery from the anaesthetic agents used during the surgery the patient was transferred to the floor where they received pain medication and physical therapy. The following day, when the patient was deemed safe for DC, they were discharged home.          Consults (From admission, onward)        Status Ordering Provider     Inpatient consult to Social Work  Once        Provider:  (Not yet assigned)    AVI Montes     Inpatient consult to Pain Management  Once        Provider:  (Not yet assigned)     AVI Montes     Inpatient consult to Respiratory Care  Once        Provider:  (Not yet assigned)    AVI Montes          Significant Diagnostic Studies: No pertinent studies.    Pending Diagnostic Studies:     None        Final Active Diagnoses:    Diagnosis Date Noted POA    PRINCIPAL PROBLEM:  Primary osteoarthritis of left knee [M17.12] 01/02/2014 Yes    Diabetes mellitus type II, uncontrolled [E11.65] 01/23/2017 Yes    HTN (hypertension) [I10] 01/02/2014 Yes      Problems Resolved During this Admission:      Discharged Condition: stable    Disposition: Home or Self Care    Follow Up:    Patient Instructions:      Activity as tolerated     Call MD for:  difficulty breathing, headache or visual disturbances     Call MD for:  extreme fatigue     Call MD for:  hives     Call MD for:  persistent dizziness or light-headedness     Call MD for:  persistent nausea and vomiting     Call MD for:  redness, tenderness, or signs of infection (pain, swelling, redness, odor or green/yellow discharge around incision site)     Call MD for:  severe uncontrolled pain     Call MD for:  temperature >100.4     Keep surgical extremity elevated     Leave dressing on - Keep it clean, dry, and intact until clinic visit   Order Comments: Do not remove surgical dressing for 2 weeks post-op. This will be done only by MD at initial post-op visit. If dressing is completely saturated, replace with identical dressing - silver-impregnated hydrocolloid dressing.     Do not get dressings wet. Do not shower.     If dressing continues to be saturated or there are signs of infection, please call [facility] [phone] for further instructions and to make appt to be seen.     Lifting restrictions   Order Comments: No strenuous exercise or lifting of > 10 lbs     No driving, operating heavy equipment or signing legal documents while taking pain medication     Sponge bath only until clinic visit     Weight  bearing as tolerated     Medications:  Reconciled Home Medications:      Medication List      START taking these medications    acetaminophen 650 MG Tbsr  Commonly known as: TYLENOL  Take 1 tablet (650 mg total) by mouth every 8 (eight) hours.     aspirin 81 MG EC tablet  Commonly known as: ECOTRIN  Take 1 tablet (81 mg total) by mouth 2 (two) times a day.     cefadroxil 500 MG Cap  Commonly known as: DURICEF  Take 1 capsule (500 mg total) by mouth every 12 (twelve) hours.     celecoxib 200 MG capsule  Commonly known as: CeleBREX  Take 1 capsule (200 mg total) by mouth once daily.     docusate sodium 100 MG capsule  Commonly known as: COLACE  Take 1 capsule (100 mg total) by mouth 2 (two) times daily.     methocarbamoL 750 MG Tab  Commonly known as: ROBAXIN  Take 1 tablet (750 mg total) by mouth 4 (four) times daily as needed (as needed for muscle spasms).     oxyCODONE 5 MG immediate release tablet  Commonly known as: ROXICODONE  Take 1 tablet (5 mg total) by mouth every 4 (four) hours as needed for Pain.        CHANGE how you take these medications    metFORMIN 500 MG ER 24hr tablet  Commonly known as: GLUCOPHAGE-XR  TAKE 2 TABLETS(1000 MG) BY MOUTH TWICE DAILY  What changed: See the new instructions.        CONTINUE taking these medications    * albuterol sulfate 2.5 mg/0.5 mL Nebu  Take 2.5 mg by nebulization every 4 (four) hours as needed (As needed for wheezing). Rescue     * albuterol 90 mcg/actuation inhaler  Commonly known as: PROVENTIL/VENTOLIN HFA  Inhale 2 puffs into the lungs every 6 (six) hours as needed for Wheezing. Use with spacer  Dispense with 1 spacer     atorvastatin 20 MG tablet  Commonly known as: LIPITOR  Take 20 mg by mouth once daily.     fluticasone propionate 50 mcg/actuation nasal spray  Commonly known as: FLONASE  1 spray (50 mcg total) by Each Nostril route 2 (two) times daily.     glipiZIDE 5 MG tablet  Commonly known as: GLUCOTROL  Take 5 mg by mouth daily with breakfast.      ketoconazole 2 % cream  Commonly known as: NIZORAL  Apply topically once daily.     loratadine 10 mg tablet  Commonly known as: CLARITIN  Take 1 tablet (10 mg total) by mouth once daily.     losartan-hydrochlorothiazide 100-25 mg 100-25 mg per tablet  Commonly known as: HYZAAR  Take 1 tablet by mouth once daily.     montelukast 10 mg tablet  Commonly known as: SINGULAIR  montelukast Take 1 time per day No date recorded tablet 1 time per day No route recorded No set duration recorded No set duration amount recorded active 10 mg     sodium chloride 0.65 % nasal spray  Commonly known as: OCEAN NASAL  1 spray by Nasal route every 3 (three) hours as needed for Congestion.     UNKNOWN TO PATIENT  Dayquil         * This list has 2 medication(s) that are the same as other medications prescribed for you. Read the directions carefully, and ask your doctor or other care provider to review them with you.            STOP taking these medications    diclofenac sodium 1 % Gel  Commonly known as: VOLTAREN     NYQUIL ORAL            Raffy Cadet MD  Orthopedics  [facility])

## 2022-02-15 NOTE — ASSESSMENT & PLAN NOTE
72 y.o. female POD1 s/p Left TKA 2.14.22    Pain control: multimodal  PT/OT: WBAT LLE with walker  DVT PPx: ASA 81 BID, FCDs at all times when not ambulating  Abx: postop Ancef complete, cefadroxil ordered at 9 am today  Labs: None  Drain: None  Bledsoe: None    Dispo: Strict glucose control. Spoke with nurse this am, instructed to give 10 units of novolog this am with glucose at 370. Also, encouraged patient to avoid sweets and emphasized the importance of glucose control in jacquelyn op period. Glipizide scheduled at 700 this am. Will discuss again with anesthesia team for additional recommendations on glucose management. Otherwise, patient doing very well. Will work with therapy this am, assess glucose, and evaluate for DC this am.

## 2022-02-15 NOTE — NURSING
Dr Trejo saw pt at bedside, states will order a one time dose of Losartan to address patients blood pressure and pt may be discharged. Dr doherty no need to keep pt to recheck due to med taking a while to work.   Pt informed.

## 2022-02-15 NOTE — PROGRESS NOTES
Notified Dr. Lovell of pt's bg = 202.  Per SSI, 2 units of insulin administered.  No new orders given at this time.

## 2022-02-15 NOTE — PLAN OF CARE
"Patient tolerated PT session well. Patient ambulated 225ft x2 trials with RW and supervision. No LOB or SOB noted. Patient ascended/descended 7" curb step with RW and contact guard assistance. Patient performed L LE therex x10 reps. Patient has an OPPT appointment on 2022. Patient ready to discharge home from PT standpoint.       Problem: Physical Therapy Goal  Goal: Physical Therapy Goal  Description: Goals to be met by: 2022    Patient will increase functional independence with mobility by performin. Supine to sit with supervision  2. Sit to stand transfer with Supervision  3. Gait x300 feet with Supervision using Rolling Walker  4. Ascend/Descend 1 curb step with Stand by assistance using Rolling Walker  5. Lower extremity exercise program x30 reps per handout, with supervision        Outcome: Adequate for Care Transition     "

## 2022-02-15 NOTE — NURSING
Discharge instructions discussed with pt. Pt sitting in the recliner with wheels locked. Pts blood pressure is elevated. Pt encouraged to relax and will recheck in a few minutes. Pt denies distress or the need for pain med. Pt is asymptomatic.   Pt states she has a walker, shower chair and bedside commode at home.

## 2022-02-15 NOTE — NURSING
Anesthesia informed that pt does not want to have her blood glucose rechecked. She is tired of being stuck and wants to go home. Anesthesia states it ok for pt to be discharged.

## 2022-02-15 NOTE — NURSING
Anesthesia informed of pts current blood glucose. Informed pt medicated with Metformin and Insulin late due to working with OT/PT. Informed will recheck at 1130.

## 2022-02-15 NOTE — PLAN OF CARE
Problem: Adult Inpatient Plan of Care  Goal: Plan of Care Review  Outcome: Ongoing, Progressing  Flowsheets (Taken 2/15/2022 0742)  Plan of Care Reviewed With: patient  Goal: Patient-Specific Goal (Individualized)  Outcome: Ongoing, Progressing  Flowsheets (Taken 2/15/2022 0742)  Anxieties, Fears or Concerns: Elevated blood sugars  Individualized Care Needs: Keep patient and daughter updated on Plan of Care  Patient-Specific Goals (Include Timeframe): Pain management  Goal: Optimal Comfort and Wellbeing  Outcome: Ongoing, Progressing  Intervention: Monitor Pain and Promote Comfort  Flowsheets (Taken 2/15/2022 0742)  Pain Management Interventions:   care clustered   cold applied   medication offered   pain management plan reviewed with patient/caregiver   position adjusted   relaxation techniques promoted     Problem: Diabetes Comorbidity  Goal: Blood Glucose Level Within Targeted Range  Outcome: Ongoing, Progressing  Intervention: Monitor and Manage Glycemia  Flowsheets (Taken 2/15/2022 0742)  Glycemic Management:   blood glucose monitored   oral hydration promoted     Problem: Pain Acute  Goal: Acceptable Pain Control and Functional Ability  Outcome: Ongoing, Progressing  Intervention: Develop Pain Management Plan  Flowsheets (Taken 2/15/2022 0742)  Pain Management Interventions:   care clustered   cold applied   medication offered   pain management plan reviewed with patient/caregiver   position adjusted   relaxation techniques promoted  Intervention: Prevent or Manage Pain  Flowsheets (Taken 2/15/2022 0742)  Sleep/Rest Enhancement:   awakenings minimized   relaxation techniques promoted  Sensory Stimulation Regulation:   auditory stimulation minimized   quiet environment promoted  Bowel Elimination Promotion: adequate fluid intake promoted  Complementary Therapy: aromatherapy utilized  Medication Review/Management:   medications reviewed   high-risk medications identified     Problem: Bleeding (Knee  Arthroplasty)  Goal: Absence of Bleeding  Outcome: Ongoing, Progressing  Intervention: Monitor and Manage Bleeding  Flowsheets (Taken 2/15/2022 0742)  Bleeding Management: dressing monitored     Problem: Neurovascular Compromise (Knee Arthroplasty)  Goal: Intact Neurovascular Status  Outcome: Ongoing, Progressing  Intervention: Prevent or Manage Neurovascular Compromise  Flowsheets (Taken 2/15/2022 0742)  Compartment Syndrome Management: active flexion/extension encouraged  Compartment Syndrome Surveillance: no pain with passive muscle stretch     Problem: Pain (Knee Arthroplasty)  Goal: Acceptable Pain Control  Outcome: Ongoing, Progressing  Intervention: Prevent or Manage Pain  Flowsheets (Taken 2/15/2022 0742)  Complementary Therapy: aromatherapy utilized  Diversional Activities:   smartphone   television  Pain Management Interventions:   care clustered   cold applied   medication offered   pain management plan reviewed with patient/caregiver   position adjusted   relaxation techniques promoted

## 2022-02-15 NOTE — SUBJECTIVE & OBJECTIVE
Principal Problem:Primary osteoarthritis of left knee    Principal Orthopedic Problem: same    Interval History: Patient seen and examined at bedside. Vital signs stable except for elevated Bp. Systolic 140s-150s. Glucose this am between 340-370. Discussed glucose with patient and noted the valentines day chocolate at bedside she reports that one of her relatives gave her the chocolate, and she confessed to eating several pieces. Per nursing chart review, patient also had a snack last night of a sandwich. Per standard protocol, patient was only given 1/2 dose of the SSI since this was in the evening time. The nurse contacted the Nurse practicioner who said to only give the 1/2 dose. So only 4 units of novolog were administered. This am, the glucose was quite elevated in the mid 300s. Her glipizide is due at 0700 this am. Pain controlled.  Walked 90 feet with PT yesterday.      Review of patient's allergies indicates:   Allergen Reactions    Sulfa (sulfonamide antibiotics) Itching and Rash       Current Facility-Administered Medications   Medication    0.9%  NaCl infusion    acetaminophen tablet 1,000 mg    albuterol inhaler 2 puff    albuterol sulfate nebulizer solution 2.5 mg    aspirin EC tablet 81 mg    bisacodyL suppository 10 mg    cefadroxil capsule 500 mg    celecoxib capsule 200 mg    dextrose 10% bolus 125 mL    dextrose 10% bolus 250 mL    famotidine tablet 20 mg    fluticasone propionate 50 mcg/actuation nasal spray 50 mcg    glipiZIDE tablet 5 mg    glucagon (human recombinant) injection 1 mg    glucose chewable tablet 16 g    glucose chewable tablet 24 g    insulin aspart U-100 pen 1-10 Units    losartan-hydrochlorothiazide 50-12.5 mg per tablet 2 tablet    methocarbamoL tablet 750 mg    morphine injection 2 mg    mupirocin 2 % ointment 1 g    naloxone 0.4 mg/mL injection 0.02 mg    ondansetron injection 4 mg    oxyCODONE immediate release tablet 5 mg    oxyCODONE immediate  "release tablet Tab 10 mg    polyethylene glycol packet 17 g    pregabalin capsule 75 mg    prochlorperazine injection Soln 5 mg    ROPIvacaine (PF) 2 mg/ml (0.2%) solution    senna-docusate 8.6-50 mg per tablet 1 tablet    sodium chloride 0.65 % nasal spray 2 spray     Objective:     Vital Signs (Most Recent):  Temp: 97.6 °F (36.4 °C) (02/15/22 0420)  Pulse: 96 (02/15/22 0420)  Resp: 18 (02/15/22 0420)  BP: 129/60 (02/15/22 0420)  SpO2: 96 % (02/15/22 0420) Vital Signs (24h Range):  Temp:  [97.1 °F (36.2 °C)-98.3 °F (36.8 °C)] 97.6 °F (36.4 °C)  Pulse:  [] 96  Resp:  [12-20] 18  SpO2:  [95 %-100 %] 96 %  BP: (123-158)/(59-76) 129/60     Weight: 92.5 kg (204 lb)  Height: 5' 3" (160 cm)  Body mass index is 36.14 kg/m².      Intake/Output Summary (Last 24 hours) at 2/15/2022 0621  Last data filed at 2/15/2022 0420  Gross per 24 hour   Intake 2900 ml   Output 1700 ml   Net 1200 ml       Ortho/SPM Exam  AAOx4  NAD  Reg rate  No increased WOB  Dressing c/d/i  Polar ice in place  SILT T/SP/DP/Walter/Sa  Motor intact T/SP/DP  WWP extremities  FCDs in place and functioning    Significant Labs: None    Significant Imaging: I have reviewed all pertinent imaging results/findings.   TKA hardware in good position, no fracture  "

## 2022-02-15 NOTE — NURSING
"Patient wants to eat a sandwich. Notified Meseret Wyatt NP that patient is going to eat, asked if she wanted to give patient the 'pre meal" dose of insulin indicated per sliding scale, which would be 8 units. No neworders received. She advised to just give 4 units.   "

## 2022-02-15 NOTE — ADDENDUM NOTE
Addendum  created 02/15/22 1314 by Willi Beach MD    Order list changed, Pharmacy for encounter modified

## 2022-02-15 NOTE — NURSING
Pt was working with PT in the gym when attempting to give the ordered Metformin and Insulin earlier. Pt informed of order for meds to control blood glucose. Blood glucose 295. Pt medicated with meds per order. Informed will recheck at 1130. Instructed pt to call for any distress.

## 2022-02-15 NOTE — PROGRESS NOTES
[facility]ute [facility] Service [facility] Progress Note    HPI  Abigail Pierre is a 72 y.o., female, s/p left TKA.  No acute events over night.     Interval history      Surgery:  Procedure(s) (LRB):  ARTHROPLASTY, KNEE, TOTAL: LEFT: DEPUY-SIGMA (Left)    Post Op Day #: 1        Problem List:    Active Hospital Problems    Diagnosis  POA    *Primary osteoarthritis of left knee [M17.12]  Yes      Resolved Hospital Problems   No resolved problems to display.       Subjective:       General appearance of alert, oriented, no complaints   Pain with rest: 2    Numbers   Pain with movement: 2    Numbers   Side Effects    1. Pruritis No    2. Nausea No    3. Motor Blockade No, 0=Ability to raise lower extremities off bed    4. Sedation No, 1=awake and alert    Schedule Medications:    acetaminophen  1,000 mg Oral Q6H    aspirin  81 mg Oral BID    cefadroxil  500 mg Oral Q12H    celecoxib  200 mg Oral Daily    famotidine  20 mg Oral BID    fluticasone propionate  1 spray Each Nostril BID    glipiZIDE  5 mg Oral Daily with breakfast    losartan-hydrochlorothiazide 50-12.5 mg  2 tablet Oral Daily    methocarbamoL  750 mg Oral TID    mupirocin  1 g Nasal BID    polyethylene glycol  17 g Oral Daily    pregabalin  75 mg Oral QHS    senna-docusate 8.6-50 mg  1 tablet Oral BID        Continuous Infusions:   sodium chloride 0.9% 150 mL/hr at 02/14/22 2049    ROPIvacaine (PF) 2 mg/ml (0.2%)          PRN Medications:  albuterol, albuterol sulfate, bisacodyL, dextrose 10%, dextrose 10%, glucagon (human recombinant), glucose, glucose, insulin aspart U-100, morphine, naloxone, ondansetron, oxyCODONE, oxyCODONE, prochlorperazine, sodium chloride       Antibiotics:  Antibiotics (From admission, onward)            Start     Stop Route Frequency Ordered    02/15/22 0900  cefadroxil capsule 500 mg         -- Oral Every 12 hours 02/14/22 1428    02/14/22 2100  mupirocin 2 % ointment 1 g         02/19 2059 Nasl 2 times  daily 02/14/22 1726             Objective:              Vital Signs (Most Recent):  Temp: 97.6 °F (36.4 °C) (02/15/22 0420)  Pulse: 96 (02/15/22 0420)  Resp: 18 (02/15/22 0420)  BP: 129/60 (02/15/22 0420)  SpO2: 96 % (02/15/22 0420) Vital Signs Range (Last 24H):  Temp:  [97.1 °F (36.2 °C)-98.3 °F (36.8 °C)]   Pulse:  []   Resp:  [12-20]   BP: (123-158)/(59-76)   SpO2:  [95 %-100 %]          I & O (Last 24H):    Intake/Output Summary (Last 24 hours) at 2/15/2022 0528  Last data filed at 2/15/2022 0420  Gross per 24 hour   Intake 2900 ml   Output 1700 ml   Net 1200 ml       Physical Exam:    GA: Alert, comfortable, no acute distress.   Pulmonary: Clear to auscultation A/P/L. No wheezing, crackles, or rhonchi.  Cardiac: RRR S1 & S2 w/o rubs/murmurs/gallops.   Abdominal:Bowel sounds present. No tenderness to palpation or distension. No appreciable hepatosplenomegaly.   Skin: No jaundice, rashes, or visible lesions.  M/S: DF/PF/EHL       Laboratory:  CBC: No results for input(s): WBC, RBC, HGB, HCT, PLT, MCV, MCH, MCHC in the last 72 hours.    BMP: No results for input(s): NA, K, CO2, CL, BUN, CREATININE, GLU, MG, PHOS, CALCIUM in the last 72 hours.    No results for input(s): PT, INR, PROTIME, APTT in the last 72 hours.      Anticoagulant dose ASA 81mg at 2032.    Assessment:         Pain control adequate    Plan:     1) Pain:  Multimodal pain regimen with acetaminophen, Celebrex, Lyrica, and prn oxycodone given  Will continue to monitor. Plan to discharge on POD #1.   2)HTN well controled with medications.  Cont to monitor.    3) FEN/GI: Tolerating regular diet   4) Dispo: Pt working well with PT/OT. Case management and SW for placement. Plan for discharge POD #1.        Evaluator Meseret Wyatt NP

## 2022-02-15 NOTE — NURSING
Anesthesia Dr Trejo informed of elevated blood pressure 170s-190s systolic . Dr doherty will look into chart and place orders as needed. Pt informed.

## 2022-02-15 NOTE — NURSING
POCT glucose 345. Notified Meseret Wyatt NP of result. Per sliding scale, patient will get 4 units of insulin. No new orders received, advisied to give 4 units.

## 2022-02-15 NOTE — PLAN OF CARE
Problem: Adult Inpatient Plan of Care  Goal: Plan of Care Review  Outcome: Ongoing, Progressing  Flowsheets (Taken 2/14/2022 1818)  Plan of Care Reviewed With:   patient   daughter     Problem: Adult Inpatient Plan of Care  Goal: Patient-Specific Goal (Individualized)  Outcome: Ongoing, Progressing  Flowsheets (Taken 2/14/2022 1818)  Anxieties, Fears or Concerns: none  Individualized Care Needs: keep pt and daughter updated on plan of care  Patient-Specific Goals (Include Timeframe): post op pain control     Problem: Diabetes Comorbidity  Goal: Blood Glucose Level Within Targeted Range  Intervention: Monitor and Manage Glycemia  Flowsheets (Taken 2/14/2022 1818)  Glycemic Management: blood glucose monitored     Problem: Pain Acute  Goal: Acceptable Pain Control and Functional Ability  Intervention: Develop Pain Management Plan  Flowsheets (Taken 2/14/2022 1818)  Pain Management Interventions:   care clustered   cold applied   pain management plan reviewed with patient/caregiver     Problem: Pain Acute  Goal: Acceptable Pain Control and Functional Ability  Intervention: Prevent or Manage Pain  Flowsheets (Taken 2/14/2022 1818)  Sensory Stimulation Regulation:   care clustered   lighting decreased     Problem: Pain Acute  Goal: Acceptable Pain Control and Functional Ability  Intervention: Optimize Psychosocial Wellbeing  Flowsheets (Taken 2/14/2022 1818)  Supportive Measures: active listening utilized     Problem: Bleeding (Knee Arthroplasty)  Goal: Absence of Bleeding  Intervention: Monitor and Manage Bleeding  Flowsheets (Taken 2/14/2022 1818)  Bleeding Management: dressing monitored     Problem: Infection (Knee Arthroplasty)  Goal: Absence of Infection Signs and Symptoms  Intervention: Prevent or Manage Infection  Flowsheets (Taken 2/14/2022 1818)  Infection Management: aseptic technique maintained     Problem: Pain (Knee Arthroplasty)  Goal: Acceptable Pain Control  Intervention: Prevent or Manage  Pain  Flowsheets (Taken 2/14/2022 1818)  Pain Management Interventions:   care clustered   cold applied   pain management plan reviewed with patient/caregiver

## 2022-02-15 NOTE — PT/OT/SLP PROGRESS
"Physical Therapy Treatment and Discharge    Patient Name:  Abigail Pierre   MRN:  [id number]    Recommendations:     Discharge Recommendations:  outpatient PT   Discharge Equipment Recommendations: none   Barriers to discharge: None    Assessment:     Abigail Pierre is a 72 y.o. female admitted with a medical diagnosis of Primary osteoarthritis of left knee.  She presents with the following impairments/functional limitations:  weakness,impaired functional mobilty,gait instability,impaired balance,decreased lower extremity function,pain,decreased ROM,impaired skin,orthopedic precautions. Patient tolerated PT session well. Patient ambulated 225ft x2 trials with RW and supervision. No LOB or SOB noted. Patient ascended/descended 7" curb step with RW and contact guard assistance. Patient performed L LE therex x10 reps. Patient has an OPPT appointment on 2/16/2022. Patient ready to discharge home from PT standpoint.     Rehab Prognosis: Good; patient would benefit from acute skilled PT services to address these deficits and reach maximum level of function.    Recent Surgery: Procedure(s) (LRB):  ARTHROPLASTY, KNEE, TOTAL: LEFT: DEPUY-SIGMA (Left) 1 Day Post-Op    Plan:     During this hospitalization, patient to be seen daily to address the identified rehab impairments via gait training,therapeutic activities,therapeutic exercises and progress toward the following goals:    · Plan of Care Expires:  02/18/22    Subjective     Chief Complaint: Pain in left knee.   Patient/Family Comments/goals: To go home.   Pain/Comfort:  · Pain Rating 1: 4/10  · Location - Side 1: Left  · Location 1: knee  · Pain Addressed 1: Pre-medicate for activity,Reposition,Distraction      Objective:     Communicated with RN prior to session.  Patient found up in chair with cryotherapy upon PT entry to room.     General Precautions: Standard, fall   Orthopedic Precautions:LLE weight bearing as tolerated   Braces: N/A     Functional Mobility:  · Mat " "Mobility:     · Supine to Sit: supervision  · Sit to Supine: supervision  · Transfers:     · Sit to Stand:  supervision with rolling walker x1 from bedside chair and x1 from mat  · Gait: Patient ambulated 225ft x2 trials with Rolling Walker and supervision using swing-through gait. Patient demonstrated decreased chitra and decreased step length during gait due to pain, decreased ROM and decreased strength.  · Stairs:  Patient ascended/descended 7" curb step with RW and contact guard assistance.       AM-PAC 6 CLICK MOBILITY  Turning over in bed (including adjusting bedclothes, sheets and blankets)?: 4  Sitting down on and standing up from a chair with arms (e.g., wheelchair, bedside commode, etc.): 4  Moving from lying on back to sitting on the side of the bed?: 4  Moving to and from a bed to a chair (including a wheelchair)?: 4  Need to walk in hospital room?: 4  Climbing 3-5 steps with a railing?: 3  Basic Mobility Total Score: 23       Therapeutic Activities and Exercises:  Patient educated in and performed L LE exercises x10 reps for ankle pumps, quad set, glute set, SAQ over bolster, heel slides, hip abd/add, SLR, and LAQ.      Patient educated in:  -PT role and POC  -safety with transfers including hand placement  -gait sequencing and RW management  -OOB activity to maximize recovery including ambulating at home to prevent DVT   -truck transfer  -curb training  -HEP for therex at home with handout provided       Patient left up in chair with all lines intact, call button in reach and RN notified.    GOALS:   Multidisciplinary Problems     Physical Therapy Goals        Problem: Physical Therapy Goal    Goal Priority Disciplines Outcome Goal Variances Interventions   Physical Therapy Goal     PT, PT/OT Adequate for Care Transition     Description: Goals to be met by: 2022    Patient will increase functional independence with mobility by performin. Supine to sit with supervision  2. Sit to stand " transfer with Supervision  3. Gait x300 feet with Supervision using Rolling Walker  4. Ascend/Descend 1 curb step with Stand by assistance using Rolling Walker  5. Lower extremity exercise program x30 reps per handout, with supervision                         Time Tracking:     PT Received On: 02/15/22  PT Start Time: 1030     PT Stop Time: 1053  PT Total Time (min): 23 min     Billable Minutes: Gait Training 13 and Therapeutic Exercise 10    Treatment Type: Treatment  PT/PTA: PT           02/15/2022

## 2022-02-15 NOTE — NURSING
Pt medicated with Losartan po per orders. PIV discontinued to Right hand with catheter intact, pressure dressing applied. Surgical site to the Left knee clean dry and intact.     Pt wheeled to car by RN in no distress. Personal items brought to car by male friend. Pt reminded to call number on arm band for any distress and to check her blood pressure at home.

## 2022-02-15 NOTE — NURSING
Entered room to find patient eating [employer] chocolates. Instructed patient that she is on a diabetic diet and that she should not be eating candy. Patient verbalizes understanding. Candy moved out of patient's reach.

## 2022-02-15 NOTE — PT/OT/SLP PROGRESS
Occupational Therapy   Treatment and Discharge    Name: Abigail Pierre  MRN: [id number]  Admitting Diagnosis:  Primary osteoarthritis of left knee  1 Day Post-Op    Recommendations:     Discharge Recommendations: outpatient PT  Discharge Equipment Recommendations:  none  Barriers to discharge:  None    Assessment:     Abigail Pierre is a 72 y.o. female with a medical diagnosis of Primary osteoarthritis of left knee.  She presents with s/p Left TKA. Performance deficits affecting function are impaired functional mobilty,gait instability,impaired balance,decreased safety awareness,pain,decreased lower extremity function,orthopedic precautions.     Patient eager and motivated to maintain independence with all mobility and functional tasks, required cueing for safety. Education provided on safety during LB dressing using post surgical techniques and energy conservation once returned home. Patient completed bed mobility, sit>stand transfers, functional mobility of household distance ~30ft with Mod-[place] using RW. Patient completed toileting, grooming/hygiene, and UB dressing with [place]. Patient completed LB dressing with Supervision-Min A.    Rehab Prognosis:  Good; patient would benefit from acute skilled OT services to address these deficits and reach maximum level of function.       Plan:     · Patient is appropriate for discharge from acute skilled OT services at this time.    Subjective     Pain/Comfort:  · Pain Rating 1: 4/10  · Location - Side 1: Left  · Location - Orientation 1: generalized  · Location 1: knee  · Pain Addressed 1: Pre-medicate for activity,Reposition,Distraction,Cessation of Activity  · Pain Rating Post-Intervention 1: 4/10    Objective:     Communicated with: Nurse Sweeney prior to session.  Patient found HOB elevated with cryotherapy,peripheral IV,FCD upon OT entry to room.    General Precautions: Standard, fall   Orthopedic Precautions:LLE weight bearing as tolerated   Braces:  N/A  Respiratory Status: Room air     Occupational Performance:     Bed Mobility:    · Patient completed Rolling/Turning to Right with independence; HOB elevated  · Patient completed Scooting to EOB with independence  · Patient completed Supine to Sit with independence     Functional Mobility/Transfers:  · Patient completed Sit <> Stand Transfer with modified independence  with  rolling walker x 3 trials (EOB and BSC over toilet levels)  · Patient completed Toilet Transfer Step Transfer technique with modified independence with  rolling walker and BSC over toilet  · Functional Mobility: Patient completed functional mobility of household distance ~30ft with Mod-[place] using RW    Activities of Daily Living:  · Grooming: independence to perform oral care and wash face in stance at sink  · Bathing: supervision to perform full body wipe off in stance at sink; cueing required for safety  · Upper Body Dressing: independence to don dress in stance at sink  · Lower Body Dressing: supervision to don underwear in stance, cueing for safety; Min A to doff/don socks/shoes seated  · Toileting: independence to perform pericare seated at BSC over toilet level      [facility] 6 Click ADL: 23    Treatment & Education:  -Pt education on OT role and POC.  -Importance of E/OOB activity with staff assistance, emphasis on daily participation  -Education on LB dressing techniques for post surgery  -Education on safety and energy conservation for discharge home  -Education of [place]  and wear schedule  -Safety during functional transfer and mobility ensured  -Patient provided with education on importance of Bilateral UB/LB integration during functional tasks for improvement in functional performance.   -Education provided/reviewed, questions answered within OT scope of practice.   -Patient demonstrates understanding and learning this date.         Patient left up in chair with all lines intact, call button in reach  and nurse notifiedEducation:      GOALS:   Multidisciplinary Problems     Occupational Therapy Goals        Problem: Occupational Therapy Goal    Goal Priority Disciplines Outcome Interventions   Occupational Therapy Goal     OT, PT/OT Ongoing, Progressing    Description: Goals to be met by: 2/18/22    Patient will increase functional independence with ADLs by performing:    UE Dressing with Modified [place].  LE Dressing with Supervision.  Grooming while standing at sink with Modified [place].  Toileting from toilet with Modified [place] for hygiene and clothing management.   Toilet transfer to toilet with Modified [place].                     Time Tracking:     OT Date of Treatment: 02/15/22  OT Start Time: 0944  OT Stop Time: 1010  OT Total Time (min): 26 min    Billable Minutes:Self Care/Home Management 26               2/15/2022

## 2022-02-15 NOTE — ADDENDUM NOTE
Addendum  created 02/15/22 0937 by Katty Way MD    Order list changed, Pharmacy for encounter modified

## 2022-02-15 NOTE — NURSING
Notified Meseret Wyatt NP of POCT glucose of 345, and of indicated sliding scale (4 units). No new orders received.

## 2022-02-15 NOTE — NURSING
Upon entering the room to administer Insulin and Metformin per order, Pt in the bathroom working with OT.  Will return to administer.

## 2022-02-16 ENCOUNTER — TELEPHONE (OUTPATIENT)
Dept: ORTHOPEDICS | Facility: CLINIC | Age: 72
End: 2022-02-16
Payer: MEDICARE

## 2022-02-16 ENCOUNTER — CLINICAL SUPPORT (OUTPATIENT)
Dept: REHABILITATION | Facility: HOSPITAL | Age: 72
End: 2022-02-16
Attending: ORTHOPAEDIC SURGERY
Payer: MEDICARE

## 2022-02-16 DIAGNOSIS — M62.81 MUSCLE WEAKNESS OF LOWER EXTREMITY: ICD-10-CM

## 2022-02-16 DIAGNOSIS — M25.662 DECREASED RANGE OF MOTION (ROM) OF LEFT KNEE: ICD-10-CM

## 2022-02-16 DIAGNOSIS — Z74.09 IMPAIRED FUNCTIONAL MOBILITY, BALANCE, GAIT, AND ENDURANCE: ICD-10-CM

## 2022-02-16 DIAGNOSIS — M17.12 PRIMARY OSTEOARTHRITIS OF LEFT KNEE: ICD-10-CM

## 2022-02-16 PROCEDURE — 97110 THERAPEUTIC EXERCISES: CPT | Mod: PN

## 2022-02-16 PROCEDURE — 97161 PT EVAL LOW COMPLEX 20 MIN: CPT | Mod: PN

## 2022-02-16 NOTE — PLAN OF CARE
[facility]  Discharge Final Note    Primary Care Provider: Danilo Rangel MD    Expected Discharge Date: 2/15/2022    Final Discharge Note (most recent)     Final Note - 02/15/22 1329        Final Note    Assessment Type Final Discharge Note     Anticipated Discharge Disposition Home or Self Care     What phone number can be called within the next 1-3 days to see how you are doing after discharge? [phone]     Hospital Resources/Appts/Education Provided Provided patient/caregiver with written discharge plan information;Appointments scheduled by Navigator/Coordinator               Future Appointments   Date Time Provider Department Center   2/18/2022 10:45 AM Lyssa Rod, PT [facility]   2/21/2022 10:45 AM Lyssa Rod, PT [facility]   2/23/2022 10:45 AM Arley Spears, PT [facility]   2/25/2022 10:00 AM Annalee [place], NP [facility]   2/25/2022 12:30 PM Lyssa Rod, PT [facility]   2/28/2022 10:45 AM Lyssa Rod, PT [facility]   3/2/2022 10:45 AM Arley Spears, PT [facility]   3/4/2022 11:30 AM Lyssa Rod, PT [facility]   3/7/2022 10:45 AM Lyssa Rod, PT [facility]   3/9/2022 10:45 AM Lyssa Rod, PT [facility]   3/16/2022 10:45 AM Lyssa Rod, PT [facility]   3/18/2022 10:45 AM Lyssa Rod, PT [facility]   3/21/2022 10:45 AM Lyssa Rod, PT [facility]   3/23/2022 10:45 AM Lyssa Rod, PT [facility]   3/28/2022 10:45 AM Lyssa Rod, PT [facility]   3/30/2022 10:00 AM Viviana Olivas, PT [facility]   4/4/2022 10:45 AM Viviana Le, PT [facility]   4/7/2022 10:45 AM Viviana Olivas, PT [facility]   4/11/2022 10:45 AM Viviana Olivas, PT [facility]   4/13/2022 10:00 AM Viviana Olivas, PT [facility]

## 2022-02-16 NOTE — PLAN OF CARE
"[facility]   Physical Therapy Initial Evaluation     Date: 2022   Name: Abigail Pierre  Clinic Number: [id number]    Therapy Diagnosis:   Encounter Diagnoses   Name Primary?    Primary osteoarthritis of left knee     Decreased range of motion (ROM) of left knee     Muscle weakness of lower extremity     Impaired functional mobility, balance, gait, and endurance      Physician: Steven Kapadia III, *    Physician Orders: PT Eval and Treat - Direct OP-PT. Surgery 2022. Please schedule initial evaluation on POD 2.  Medical Diagnosis from Referral: Primary osteoarthritis of left knee  Evaluation Date: 2022  Authorization Period Expiration: 3/2/22  Plan of Care Expiration: 22  Progress Note Due: 3/16/22  Visit # / Visits authorized:    FOTO: 1/3    Precautions: Diabetes and HTN, asthma, history of breast cancer     Date of Operation:   2022     Providers Performing:   Surgeon(s):  Steven Kapadia III, MD  Assistant: Harry Cadet MD     Operative Procedure:   Left Total Knee Arthroplasty, CPT 38718    Time In: 1001  Time Out: 1055  Total Appointment Time (timed & untimed codes): 54 minutes    SUBJECTIVE     Date of onset: s/p L TKA on 22 by Dr. Kapadia     History of current condition - Abigail reports: since surgery she has been doing fine aside from pain. No falls recently. She had acute PT yesterday. No difficulties with HEP given. Pt is taking pain medication as prescribed.    Falls: none    Imagin22 L knee x-ray: "Status post interval recent right knee total arthroplasty demonstrating anatomic positioning and alignment. No new displaced fracture or dislocation. Scattered subcutaneous gas about the knee consistent with recent surgery. Otherwise no change. Please refer to operative/procedure report for further details."    Prior Therapy: none in the past  Home environment: 1 story home with 1 step to enter  DME: RW  Social History: lives " alone  Physical activity: none  Occupation: not working  Prior Level of Function: independent with all ADLs, does not driving  Current Level of Function: difficulty with all ADLs, household chores, walking, bed and car transfers    Pain:  Current 8/10, worst 9/10, best 6/10   Location: L knee  Description: Aching  Aggravating Factors: Sitting, Standing, Bending, Walking, Flexing and Getting out of bed/chair  Easing Factors: pain medication, ice, rest and elevation    Patients goals: to return to how she was prior to surgery     Medical History:   Past Medical History:   Diagnosis Date    Arthritis     Asthma     Carpal tunnel syndrome 1/2/2014    COPD (chronic obstructive pulmonary disease)     Diabetes mellitus type II, uncontrolled 1/23/2017    Diabetes mellitus, type 2     DJD (degenerative joint disease) of knee 1/2/2014    Ductal carcinoma in situ (DCIS) of left breast 12/22/2016    GERD (gastroesophageal reflux disease)     History of colonic polyps 1/23/2017    Around 2015    Hyperlipidemia     Hypertension     Intrinsic asthma, unspecified 1/2/2014    Kidney stones 1/23/2017    On CT done by GI in 2015 -sent to Dr Sargent?    Periumbilical abdominal pain 1/23/2017    Seen by Cristhian Mena 10/15 -ct, colon and EGD (normal EGD)       Surgical History:   Abigail Pierre  has a past surgical history that includes Cholecystectomy; Cystoscopy w/ laser lithotripsy; Breast surgery; Colonoscopy; and Total knee arthroplasty (Left, 2/14/2022).    Medications:   Abigail has a current medication list which includes the following prescription(s): acetaminophen, albuterol, albuterol sulfate, aspirin, atorvastatin, cefadroxil, celecoxib, docusate sodium, fluticasone propionate, glipizide, ketoconazole, loratadine, losartan-hydrochlorothiazide 100-25 mg, metformin, methocarbamol, montelukast, oxycodone, sodium chloride, and UNKNOWN TO PATIENT.    Allergies:   Review of patient's allergies indicates:   Allergen  Reactions    Sulfa (sulfonamide antibiotics) Itching and Rash      OBJECTIVE     Observation: pleasant and cooperative   Gait w RW: antalgic, decreased heel strike    Posture in standing: decreased L knee extension, decreased L weight bearing    Range of Motion (deg):   Knee Right AROM/PROM Left AROM/PROM   Flexion 110 90   Extension 0 0     Lower Extremity Strength  Right LE  Left LE    Knee extension: 5/5 Knee extension: 2/5   Knee flexion: 5/5 Knee flexion: 4/5   Hip flexion: 4+/5 Hip flexion: 3+/5   Hip extension:  4/5 Hip extension: 4/5   Hip abduction: 4/5 Hip abduction: 4/5   Hip adduction: 4/5 Hip adduction 4/5   Ankle dorsiflexion: 5/5 Ankle dorsiflexion: 4/5     Special Tests:  30 sec sit to stand w UE support on chair: 5  TUG w RW: 38 seconds    Joint Mobility:    Patellar sup./inf: R hypomobile, L WNL   Patellar med/lat: R hypomobile, L WNL    Palpation:    Quad contraction: B fair    Flexibility:   Popliteal Angle: R = -35 degrees ; L = -35 degrees    Edema: mod L knee swelling    Limitation/Restriction for FOTO knee Survey    Therapist reviewed FOTO scores for Abigail Pierre on 2/16/2022.   FOTO documents entered into [employer] - see Media section.    Limitation Score: 39%     TREATMENT     Total Treatment time (time-based codes) separate from Evaluation: 20 minutes      Abigail received the treatments listed below:      Therapeutic exercises to develop strength, endurance, ROM, flexibility, posture and core stabilization for 15 minutes including:    Seated ankle PF x 20  Seated march x 20  SAQ x 20  Bridges x 20  Supine hamstring stretch w strap 3 x 30 sec  SLR w mod A 2 x 10    Pt received the following manual therapy techniques for 5 minutes:     Knee PROM  Patellar mobs    Pt received cryotherapy for 10 minutes to L knee with elevation at end of session.    PATIENT EDUCATION AND HOME EXERCISES     Education provided:   - importance of performing HEP to tolerance  - importance of knee extension and quad  strengthening    Written Home Exercises Provided: yes. Exercises were reviewed and Abigail was able to demonstrate them prior to the end of the session.  Abigail demonstrated good  understanding of the education provided. See EMR under Patient Instructions for exercises provided during therapy sessions.    ASSESSMENT     Abigail is a 72 y.o. female referred to outpatient Physical Therapy with a medical diagnosis of Primary osteoarthritis of left knee. Patient presents s/p L TKA on 2/14/22 by Dr. Kapadia with reports of L knee pain, decreased knee AROM, LE weakness, knee swelling, impaired gait, and decreased functional mobility. Good to fair response to exercise program and cryotherapy. HEP provided.    Patient prognosis is Good to fair.   Patientt will benefit from skilled outpatient Physical Therapy to address the deficits stated above and in the chart below, provide patient /family education, and to maximize patientt's level of independence.     Plan of care discussed with patient: Yes  Patient's spiritual, cultural and educational needs considered and patient is agreeable to the plan of care and goals as stated below:     Anticipated Barriers for therapy: transportation    Medical Necessity is demonstrated by the following  History  Co-morbidities and personal factors that may impact the plan of care Co-morbidities:   COPD/asthma, diabetes, high BMI and HTN    Personal Factors:   lifestyle     high   Examination  Body Structures and Functions, activity limitations and participation restrictions that may impact the plan of care Body Regions:   lower extremities  trunk    Body Systems:    gross symmetry  ROM  strength  gross coordinated movement  balance  gait  transfers  transitions  motor control  motor learning  edema  scar formation    Participation Restrictions:   ADLs, IADLs, domestic duties    Activity limitations:   Learning and applying knowledge  no deficits    General Tasks and Commands  no  deficits    Communication  no deficits    Mobility  lifting and carrying objects  walking    Self care  washing oneself (bathing, drying, washing hands)  toileting  dressing    Domestic Life  shopping  cooking  doing house work (cleaning house, washing dishes, laundry)    Interactions/Relationships  no deficits    Life Areas  no deficits    Community and Social Life  community life  recreation and leisure         high   Clinical Presentation stable and uncomplicated low   Decision Making/ Complexity Score: low     Medical necessity is demonstrated by the following IMPAIRMENTS/PROBLEM LIST:   1) Increase in pain level limiting function   2) Decreased knee AROM   3) LE weakness   4) Difficulty walking long distances   5) Lack of HEP    GOALS: Short Term Goals:  6 weeks in progress  1. Report decreased L knee pain  <  / =  5/10 at worst to increase tolerance for sleep.  2. Pt will be able to perform 2 x 10 multi-directional LE strengthening without fatigue in order to improve ability to perform household chores.  3. Pt will increase knee flexion AROM to 110 degrees to indicate improved flexibility.   4. Pt will report 50% improvement in ability to walk long distances since start of care to indicate improved functional mobility.   5. Pt to tolerate HEP to improve ROM and independence with ADL's.    Long Term Goals: 12 weeks in progress  1. Report decreased L knee pain  <  / =  3/10 at worst to increase tolerance for sleep.  2. Pt will be able to perform 2 x 10 multi-directional LE strengthening without fatigue in order to improve ability to perform household chores.  3. Pt will report 80% improvement in ability to walk long distances since start of care to indicate improved functional mobility.   4. Pt will increase knee flexion AROM to 120 degrees to indicate improved flexibility.  5. Pt to be Independent with HEP to improve ROM and independence with ADL's.    PLAN   Plan of care Certification: 2/16/2022 to  5/16/22.    Outpatient Physical Therapy 2-3 times weekly for 12 weeks to include the following interventions: Electrical Stimulation NMES, Gait Training, Manual Therapy, Moist Heat/ Ice, Neuromuscular Re-ed, Patient Education, Therapeutic Activities and Therapeutic Exercise.     Viviana Olivas, PT      I CERTIFY THE NEED FOR THESE SERVICES FURNISHED UNDER THIS PLAN OF TREATMENT AND WHILE UNDER MY CARE   Physician's comments:     Physician's Signature: ___________________________________________________

## 2022-02-18 ENCOUNTER — TELEPHONE (OUTPATIENT)
Dept: ORTHOPEDICS | Facility: CLINIC | Age: 72
End: 2022-02-18
Payer: MEDICARE

## 2022-02-21 ENCOUNTER — CLINICAL SUPPORT (OUTPATIENT)
Dept: REHABILITATION | Facility: HOSPITAL | Age: 72
End: 2022-02-21
Attending: ORTHOPAEDIC SURGERY
Payer: MEDICARE

## 2022-02-21 DIAGNOSIS — M25.662 DECREASED RANGE OF MOTION (ROM) OF LEFT KNEE: Primary | ICD-10-CM

## 2022-02-21 DIAGNOSIS — Z74.09 IMPAIRED FUNCTIONAL MOBILITY, BALANCE, GAIT, AND ENDURANCE: ICD-10-CM

## 2022-02-21 DIAGNOSIS — M62.81 MUSCLE WEAKNESS OF LOWER EXTREMITY: ICD-10-CM

## 2022-02-21 DIAGNOSIS — Z96.652 STATUS POST LEFT KNEE REPLACEMENT: Primary | ICD-10-CM

## 2022-02-21 PROCEDURE — 97010 HOT OR COLD PACKS THERAPY: CPT | Mod: PN

## 2022-02-21 PROCEDURE — 97110 THERAPEUTIC EXERCISES: CPT | Mod: PN

## 2022-02-21 NOTE — PROGRESS NOTES
[facility]   Physical Therapy Treatment Note     Name: Abigail Pierre  Clinic Number: [id number]    Therapy Diagnosis:   Encounter Diagnoses   Name Primary?    Decreased range of motion (ROM) of left knee Yes    Muscle weakness of lower extremity     Impaired functional mobility, balance, gait, and endurance      Physician: Steven Kapadia III, *    Visit Date: 2/21/2022    Physician Orders: PT Eval and Treat - Direct OP-PT. Surgery 2/14/2022. Please schedule initial evaluation on POD 2.  Medical Diagnosis from Referral: Primary osteoarthritis of left knee  Evaluation Date: 2/16/2022  Authorization Period Expiration: 3/2/22  Plan of Care Expiration: 5/16/22  Progress Note Due: 3/16/22  Visit # / Visits authorized: 1/ 1   FOTO: 1/3    PTA Visit #: 0/5     Time In: 1115 (30 minutes late)  Time Out: 1140   Total Billable Time: 20 minutes    SUBJECTIVE     Pt reports: Pt reports severe pain and inability to get pain levels under control since surgery. States the pain meds that she is currently taken have not helped much.   She was minimally compliant with home exercise program.  Response to previous treatment: 1st tx    Functional change: continue to monitor     Pain: 9/10  Location: left knee      OBJECTIVE     Objective Measures updated at progress report unless specified.     Treatment     Abigail received the treatments listed below:      therapeutic exercises to develop strength, endurance, ROM, flexibility, posture and core stabilization for 15 minutes including:  Heel slides 3'  Quad sets 5''x20  Ankle pumps 3x10  Calf stretch with strap 3'   Patient education    cold pack for 8 minutes to L knee.        Patient Education and Home Exercises     Home Exercises Provided and Patient Education Provided     Education provided:   - Importance of adherence to HEP and regular attendance for PT visits  -pain and swelling management     Written Home Exercises Provided: Patient instructed to  cont prior HEP. Exercises were reviewed and Abigail was able to demonstrate them prior to the end of the session.  Abigail demonstrated good  understanding of the education provided. See EMR under Patient Instructions for exercises provided during therapy sessions    ASSESSMENT     Patient reports some reduction in pain following session. Continue to educate importance of adherence to HEP  and continue to review HEP in coming visits. Achieved approximately 75 degrees of AAROM flexion following heel slides this day. Primarily limited by pain this session.     Abigail Is progressing well towards her goals.   Pt prognosis is Good.     Pt will continue to benefit from skilled outpatient physical therapy to address the deficits listed in the problem list box on initial evaluation, provide pt/family education and to maximize pt's level of independence in the home and community environment.     Pt's spiritual, cultural and educational needs considered and pt agreeable to plan of care and goals.     Anticipated barriers to physical therapy: transportation    Goals: GOALS: Short Term Goals:  6 weeks in progress  1. Report decreased L knee pain  <  / =  5/10 at worst to increase tolerance for sleep.  2. Pt will be able to perform 2 x 10 multi-directional LE strengthening without fatigue in order to improve ability to perform household chores.  3. Pt will increase knee flexion AROM to 110 degrees to indicate improved flexibility.   4. Pt will report 50% improvement in ability to walk long distances since start of care to indicate improved functional mobility.   5. Pt to tolerate HEP to improve ROM and independence with ADL's.     Long Term Goals: 12 weeks in progress  1. Report decreased L knee pain  <  / =  3/10 at worst to increase tolerance for sleep.  2. Pt will be able to perform 2 x 10 multi-directional LE strengthening without fatigue in order to improve ability to perform household chores.  3. Pt will report 80%  improvement in ability to walk long distances since start of care to indicate improved functional mobility.   4. Pt will increase knee flexion AROM to 120 degrees to indicate improved flexibility.  5. Pt to be Independent with HEP to improve ROM and independence with ADL's.    PLAN     Continue per YUMIKO Velasco, PT

## 2022-02-22 ENCOUNTER — TELEPHONE (OUTPATIENT)
Dept: ORTHOPEDICS | Facility: CLINIC | Age: 72
End: 2022-02-22
Payer: MEDICARE

## 2022-02-22 DIAGNOSIS — Z96.652 S/P TOTAL KNEE REPLACEMENT USING CEMENT, LEFT: Primary | ICD-10-CM

## 2022-02-22 NOTE — PROGRESS NOTES
Home health for PT and OT orders entered. Pt unable to go to outpatient PT due to transportation issues and inability to drive herself. She is still taking pain medication and can't drive. She is losing ROM due to inability to get to her PT appts.

## 2022-02-23 ENCOUNTER — PATIENT OUTREACH (OUTPATIENT)
Dept: ADMINISTRATIVE | Facility: HOSPITAL | Age: 72
End: 2022-02-23
Payer: MEDICARE

## 2022-02-23 DIAGNOSIS — M81.0 OSTEOPOROSIS, UNSPECIFIED OSTEOPOROSIS TYPE, UNSPECIFIED PATHOLOGICAL FRACTURE PRESENCE: ICD-10-CM

## 2022-02-23 DIAGNOSIS — E11.65 UNCONTROLLED TYPE 2 DIABETES MELLITUS WITH HYPERGLYCEMIA: ICD-10-CM

## 2022-02-23 DIAGNOSIS — Z11.59 NEED FOR HEPATITIS C SCREENING TEST: Primary | ICD-10-CM

## 2022-02-23 NOTE — TELEPHONE ENCOUNTER
----- Message from Annalee Canchola NP sent at 2/22/2022  8:06 AM CST -----  Regarding: RE: Transportation and PT appointment issues  Home health orders have been entered. Thank you for letting us know Lyssa.  ----- Message -----  From: Steven Kapadia III, MD  Sent: 2/21/2022   6:43 PM CST  To: Annalee Canchola NP, Blaine Ambriz, #  Subject: RE: Transportation and PT appointment issues     Thank you for the update.   I was worried this would happen.   She has a bipolar son and a daughter with young kids    Annalee, can we set her up with Home Health PT?     Thank you!    ----- Message -----  From: Lyssa Velasco PT  Sent: 2/21/2022   2:37 PM CST  To: Steven Kapadia III, MD  Subject: Transportation and PT appointment issues         Dr. Kapadia    Abigail Pierre was seen for her first visit since her evaluation today. She has expressed that she has had difficulty with pain management despite taking the medications that were prescribed post operatively and as a result has not been able to perform her home exercise program as recommended. She also states that she does not have consistent transportation which is why she missed last week's appointment and showed up late today. She was primarily limited by pain during today's session. We are trying to schedule her at times that will work best for her, but I wanted to share a status update with concern for her progress.         Lyssa Velasco PT DPT

## 2022-02-25 ENCOUNTER — OFFICE VISIT (OUTPATIENT)
Dept: ORTHOPEDICS | Facility: CLINIC | Age: 72
End: 2022-02-25
Payer: MEDICARE

## 2022-02-25 DIAGNOSIS — Z96.652 S/P TOTAL KNEE REPLACEMENT USING CEMENT, LEFT: Primary | ICD-10-CM

## 2022-02-25 PROCEDURE — 1101F PR PT FALLS ASSESS DOC 0-1 FALLS W/OUT INJ PAST YR: ICD-10-PCS | Mod: CPTII,S$GLB,, | Performed by: NURSE PRACTITIONER

## 2022-02-25 PROCEDURE — 3052F PR MOST RECENT HEMOGLOBIN A1C LEVEL 8.0 - < 9.0%: ICD-10-PCS | Mod: CPTII,S$GLB,, | Performed by: NURSE PRACTITIONER

## 2022-02-25 PROCEDURE — 1159F PR MEDICATION LIST DOCUMENTED IN MEDICAL RECORD: ICD-10-PCS | Mod: CPTII,S$GLB,, | Performed by: NURSE PRACTITIONER

## 2022-02-25 PROCEDURE — 3052F HG A1C>EQUAL 8.0%<EQUAL 9.0%: CPT | Mod: CPTII,S$GLB,, | Performed by: NURSE PRACTITIONER

## 2022-02-25 PROCEDURE — 1101F PT FALLS ASSESS-DOCD LE1/YR: CPT | Mod: CPTII,S$GLB,, | Performed by: NURSE PRACTITIONER

## 2022-02-25 PROCEDURE — 3288F FALL RISK ASSESSMENT DOCD: CPT | Mod: CPTII,S$GLB,, | Performed by: NURSE PRACTITIONER

## 2022-02-25 PROCEDURE — 99999 PR PBB SHADOW E&M-EST. PATIENT-LVL III: ICD-10-PCS | Mod: PBBFAC,,, | Performed by: NURSE PRACTITIONER

## 2022-02-25 PROCEDURE — 99024 PR POST-OP FOLLOW-UP VISIT: ICD-10-PCS | Mod: S$GLB,,, | Performed by: NURSE PRACTITIONER

## 2022-02-25 PROCEDURE — 99024 POSTOP FOLLOW-UP VISIT: CPT | Mod: S$GLB,,, | Performed by: NURSE PRACTITIONER

## 2022-02-25 PROCEDURE — 1125F PR PAIN SEVERITY QUANTIFIED, PAIN PRESENT: ICD-10-PCS | Mod: CPTII,S$GLB,, | Performed by: NURSE PRACTITIONER

## 2022-02-25 PROCEDURE — 1160F PR REVIEW ALL MEDS BY PRESCRIBER/CLIN PHARMACIST DOCUMENTED: ICD-10-PCS | Mod: CPTII,S$GLB,, | Performed by: NURSE PRACTITIONER

## 2022-02-25 PROCEDURE — 1160F RVW MEDS BY RX/DR IN RCRD: CPT | Mod: CPTII,S$GLB,, | Performed by: NURSE PRACTITIONER

## 2022-02-25 PROCEDURE — 1125F AMNT PAIN NOTED PAIN PRSNT: CPT | Mod: CPTII,S$GLB,, | Performed by: NURSE PRACTITIONER

## 2022-02-25 PROCEDURE — 3288F PR FALLS RISK ASSESSMENT DOCUMENTED: ICD-10-PCS | Mod: CPTII,S$GLB,, | Performed by: NURSE PRACTITIONER

## 2022-02-25 PROCEDURE — 99999 PR PBB SHADOW E&M-EST. PATIENT-LVL III: CPT | Mod: PBBFAC,,, | Performed by: NURSE PRACTITIONER

## 2022-02-25 PROCEDURE — 1159F MED LIST DOCD IN RCRD: CPT | Mod: CPTII,S$GLB,, | Performed by: NURSE PRACTITIONER

## 2022-02-25 RX ORDER — PREGABALIN 75 MG/1
75 CAPSULE ORAL 2 TIMES DAILY
Qty: 60 CAPSULE | Refills: 1 | Status: SHIPPED | OUTPATIENT
Start: 2022-02-25 | End: 2022-08-26

## 2022-02-28 NOTE — PROGRESS NOTES
Abigail Pierre presents for initial post-operative visit following a left total knee arthroplasty performed by Dr. Kapadia on 2/14/2022. Pt reports that she has had a lot of difficulty at home due to family not being willing to take her places. I sent home health to her home, but she says that she hasn't been able to let them work with her because of the condition of her house.     Exam:   Ambulating well with assistive device.  Incision is clean and dry without drainage or erythema.   ROM:0-95    Initial post-operative radiographs reviewed today revealing a well fixed and aligned prosthesis.    A/P:  2 weeks s/p left total knee replacement  - The patient was advised to keep the incision clean and dry for the next 24 hours after which she may wash the area with antibacterial soap in the shower. Will not submerge incision until one month post op.  - Outpatient PT: pt currently has home health coming. She has agreed to meet with them on the porch  - Continue aspirin for 1 month post op.  - Pain medication refill not needed   - Follow up in 4 weeks with surgeon. Pt will call clinic with problems/concerns.

## 2022-03-25 ENCOUNTER — OFFICE VISIT (OUTPATIENT)
Dept: ORTHOPEDICS | Facility: CLINIC | Age: 72
End: 2022-03-25
Payer: MEDICARE

## 2022-03-25 ENCOUNTER — HOSPITAL ENCOUNTER (OUTPATIENT)
Dept: RADIOLOGY | Facility: HOSPITAL | Age: 72
Discharge: HOME | End: 2022-03-25
Attending: ORTHOPAEDIC SURGERY
Payer: MEDICARE

## 2022-03-25 VITALS — HEIGHT: 64 IN | BODY MASS INDEX: 35.09 KG/M2 | WEIGHT: 205.56 LBS

## 2022-03-25 DIAGNOSIS — Z96.652 STATUS POST LEFT KNEE REPLACEMENT: Primary | ICD-10-CM

## 2022-03-25 DIAGNOSIS — Z96.652 STATUS POST LEFT KNEE REPLACEMENT: ICD-10-CM

## 2022-03-25 PROCEDURE — 1101F PR PT FALLS ASSESS DOC 0-1 FALLS W/OUT INJ PAST YR: ICD-10-PCS | Mod: CPTII,S$GLB,, | Performed by: ORTHOPAEDIC SURGERY

## 2022-03-25 PROCEDURE — 1159F MED LIST DOCD IN RCRD: CPT | Mod: CPTII,S$GLB,, | Performed by: ORTHOPAEDIC SURGERY

## 2022-03-25 PROCEDURE — 99024 PR POST-OP FOLLOW-UP VISIT: ICD-10-PCS | Mod: S$GLB,,, | Performed by: ORTHOPAEDIC SURGERY

## 2022-03-25 PROCEDURE — 99024 POSTOP FOLLOW-UP VISIT: CPT | Mod: S$GLB,,, | Performed by: ORTHOPAEDIC SURGERY

## 2022-03-25 PROCEDURE — 1159F PR MEDICATION LIST DOCUMENTED IN MEDICAL RECORD: ICD-10-PCS | Mod: CPTII,S$GLB,, | Performed by: ORTHOPAEDIC SURGERY

## 2022-03-25 PROCEDURE — 99999 PR PBB SHADOW E&M-EST. PATIENT-LVL III: ICD-10-PCS | Mod: PBBFAC,,, | Performed by: ORTHOPAEDIC SURGERY

## 2022-03-25 PROCEDURE — 73562 X-RAY EXAM OF KNEE 3: CPT | Mod: 26,LT,, | Performed by: RADIOLOGY

## 2022-03-25 PROCEDURE — 1125F PR PAIN SEVERITY QUANTIFIED, PAIN PRESENT: ICD-10-PCS | Mod: CPTII,S$GLB,, | Performed by: ORTHOPAEDIC SURGERY

## 2022-03-25 PROCEDURE — 1101F PT FALLS ASSESS-DOCD LE1/YR: CPT | Mod: CPTII,S$GLB,, | Performed by: ORTHOPAEDIC SURGERY

## 2022-03-25 PROCEDURE — 3052F PR MOST RECENT HEMOGLOBIN A1C LEVEL 8.0 - < 9.0%: ICD-10-PCS | Mod: CPTII,S$GLB,, | Performed by: ORTHOPAEDIC SURGERY

## 2022-03-25 PROCEDURE — 3008F BODY MASS INDEX DOCD: CPT | Mod: CPTII,S$GLB,, | Performed by: ORTHOPAEDIC SURGERY

## 2022-03-25 PROCEDURE — 1125F AMNT PAIN NOTED PAIN PRSNT: CPT | Mod: CPTII,S$GLB,, | Performed by: ORTHOPAEDIC SURGERY

## 2022-03-25 PROCEDURE — 99999 PR PBB SHADOW E&M-EST. PATIENT-LVL III: CPT | Mod: PBBFAC,,, | Performed by: ORTHOPAEDIC SURGERY

## 2022-03-25 PROCEDURE — 3288F FALL RISK ASSESSMENT DOCD: CPT | Mod: CPTII,S$GLB,, | Performed by: ORTHOPAEDIC SURGERY

## 2022-03-25 PROCEDURE — 73562 X-RAY EXAM OF KNEE 3: CPT | Mod: TC,LT

## 2022-03-25 PROCEDURE — 3288F PR FALLS RISK ASSESSMENT DOCUMENTED: ICD-10-PCS | Mod: CPTII,S$GLB,, | Performed by: ORTHOPAEDIC SURGERY

## 2022-03-25 PROCEDURE — 3052F HG A1C>EQUAL 8.0%<EQUAL 9.0%: CPT | Mod: CPTII,S$GLB,, | Performed by: ORTHOPAEDIC SURGERY

## 2022-03-25 PROCEDURE — 3008F PR BODY MASS INDEX (BMI) DOCUMENTED: ICD-10-PCS | Mod: CPTII,S$GLB,, | Performed by: ORTHOPAEDIC SURGERY

## 2022-03-25 PROCEDURE — 73562 XR KNEE 3 VIEW LEFT: ICD-10-PCS | Mod: 26,LT,, | Performed by: RADIOLOGY

## 2022-03-25 RX ORDER — CELECOXIB 200 MG/1
200 CAPSULE ORAL DAILY
Qty: 30 CAPSULE | Refills: 0 | Status: SHIPPED | OUTPATIENT
Start: 2022-03-25 | End: 2022-04-21

## 2022-03-25 RX ORDER — PREGABALIN 75 MG/1
75 CAPSULE ORAL 2 TIMES DAILY
Qty: 60 CAPSULE | Refills: 2 | Status: SHIPPED | OUTPATIENT
Start: 2022-03-25

## 2022-03-25 RX ORDER — METHOCARBAMOL 750 MG/1
750 TABLET, FILM COATED ORAL 3 TIMES DAILY PRN
Qty: 50 TABLET | Refills: 0 | Status: SHIPPED | OUTPATIENT
Start: 2022-03-25

## 2022-03-25 NOTE — PROGRESS NOTES
Subjective:     HPI:   Abigail Pierre is a 72 y.o. female who presents 6 weeks out from left TKA    Date of surgery: 2/14/22    Medications: hallucinations/paranoia with oxy stopped, tylenol Q8hrs, out of celebrex and robaxin     Assistive Devices: rollator    PT: initially was set up with OP PT but had problems getting there, set up with home therapy discharged 2 days ago, has not started OP PT    Going slow  Nerve pain - lightning, sharp, shooting  Says PT is telling her she is doing well, walking around house on her own       Objective:   Body mass index is 35.29 kg/m².  Exam:    Gait: min limp/antalgic none    Incision: healed    Stability:  Knee stable anterior-posterior varus and valgus stresses, no extensor lag    Extension: 4    Flexion: 120    Valgus angle: 5    Pre-op 5-125      Imaging:  Indication:  Exam status post left total knee arthroplasty  Exam Ordered: Radiographs of the left knee include a standing anteroposterior view, a lateral view in full flexion, and a sunrise view  Details of Examination: Todays exam show a well fixed, well positioned total knee arthroplasty with no evidence of wear, osteolysis, or loosening.  Impression:  Status post left total knee arthroplasty, implant in good position with no abnormality        Assessment:       ICD-10-CM ICD-9-CM   1. Status post left knee replacement  Z96.652 V43.65      Doing well     Plan:       Patient is doing very well with their total knee arthroplasty.  They will continue with their routine care of the knee replacement and see me back for their follow-up at the routine interval.  If there are problems in the interim they will see me back sooner.    AAHKS HEP for TKA  No OP PT    Rx lyrica  Rx compound cream  Rx celebrex  Rx robaxin  Continue tylenol  No more narcotics  Continue ice PRN    6 week follow up      No orders of the defined types were placed in this encounter.            Past Medical History:   Diagnosis Date    Arthritis      Asthma     Carpal tunnel syndrome 1/2/2014    COPD (chronic obstructive pulmonary disease)     Diabetes mellitus type II, uncontrolled 1/23/2017    Diabetes mellitus, type 2     DJD (degenerative joint disease) of knee 1/2/2014    Ductal carcinoma in situ (DCIS) of left breast 12/22/2016    GERD (gastroesophageal reflux disease)     History of colonic polyps 1/23/2017    Around 2015    Hyperlipidemia     Hypertension     Intrinsic asthma, unspecified 1/2/2014    Kidney stones 1/23/2017    On CT done by GI in 2015 -sent to Dr Sargent?    Periumbilical abdominal pain 1/23/2017    Seen by Cristhian Mena 10/15 -ct, colon and EGD (normal EGD)       Past Surgical History:   Procedure Laterality Date    BREAST SURGERY      CHOLECYSTECTOMY      COLONOSCOPY      CYSTOSCOPY W/ LASER LITHOTRIPSY      TOTAL KNEE ARTHROPLASTY Left 2/14/2022    Procedure: ARTHROPLASTY, KNEE, TOTAL: LEFT: DEPUY-SIGMA;  Surgeon: Steven Kapadia III, MD;  Location: [facility];  Service: Orthopedics;  Laterality: Left;       Family History   Problem Relation Age of Onset    Colon cancer Father     Heart disease Mother 50    Hypertension Mother     Diabetes Mother     Hyperlipidemia Mother     Breast cancer Neg Hx     Ovarian cancer Neg Hx        Social History     Socioeconomic History    Marital status:    Tobacco Use    Smoking status: Never Smoker    Smokeless tobacco: Never Used   Substance and Sexual Activity    Alcohol use: Never    Drug use: No

## 2022-04-06 ENCOUNTER — TELEPHONE (OUTPATIENT)
Dept: ORTHOPEDICS | Facility: CLINIC | Age: 72
End: 2022-04-06
Payer: MEDICARE

## 2022-04-06 NOTE — TELEPHONE ENCOUNTER
I called and spoke to the patient regarding her call back request. The patient stated that she has not received the cream yet. I told the patient that it should come in the mail. The patient stated that she will keep checking. The patient is understanding and has no further questions.     ----- Message from Reba Maddox sent at 4/6/2022  4:14 PM CDT -----  Contact: Patient  Pt needs to speak w/ nurse in regards to cream being ordered prior to upcoming appt    Call back @[phone]

## 2022-04-21 DIAGNOSIS — Z96.652 STATUS POST LEFT KNEE REPLACEMENT: ICD-10-CM

## 2022-04-21 RX ORDER — CELECOXIB 200 MG/1
CAPSULE ORAL
Qty: 30 CAPSULE | Refills: 0 | Status: SHIPPED | OUTPATIENT
Start: 2022-04-21 | End: 2022-05-25

## 2022-04-22 ENCOUNTER — TELEPHONE (OUTPATIENT)
Dept: ORTHOPEDICS | Facility: CLINIC | Age: 72
End: 2022-04-22
Payer: MEDICARE

## 2022-04-22 NOTE — TELEPHONE ENCOUNTER
I called and spoke to the patient. The patient stated that she has not received the cream in the mail from [facility]. I let the patient know that I will call and check to see if they sent it out or not. The patient is understanding and has no further questions.     ----- Message from Miguelina Kaiser sent at 4/22/2022 10:36 AM CDT -----  Contact: [phone]  Pt is calling to get a call back from the nurse.    Pt stated that the dr was suppose to order her a creme and have it sent to her and she hasn't received it yet.    Pt would like a call back.    [phone]

## 2022-04-22 NOTE — TELEPHONE ENCOUNTER
I called and spoke to [facility] regarding this message. They were unable to find the prescription. I re-faxed it to them.     ----- Message from Blaine Ambriz sent at 4/22/2022 11:35 AM CDT -----  Contact: [phone]  Good morning,     Can you call [facility] and see if they have it?    Sincerely,   Meng Ambriz MS, OTC  OR & Clinical Assistant to Dr. Steven Kapadia III  Phone: [phone]  Fax: [phone]    ----- Message -----  From: Rajeev Elder MA  Sent: 4/22/2022  10:55 AM CDT  To: Blaine Ambriz    Good morning Meng,    I have talked to the patient at the beginning of the month. The patient still has not received the cream in the mail. Is there anyway we can get another form filled out and faxed?     Thank you.  Rajeev Elder MA  ----- Message -----  From: Miguelina Kaiser  Sent: 4/22/2022  10:37 AM CDT  To: Kang SANTOS Staff    Pt is calling to get a call back from the nurse.    Pt stated that the dr was suppose to order her a creme and have it sent to her and she hasn't received it yet.    Pt would like a call back.    [phone]

## 2022-05-06 ENCOUNTER — TELEPHONE (OUTPATIENT)
Dept: ORTHOPEDICS | Facility: CLINIC | Age: 72
End: 2022-05-06
Payer: MEDICARE

## 2022-05-06 NOTE — TELEPHONE ENCOUNTER
I called the patient due to her appointment cancellation today. to get her rescheduled. The patient did not answer. I left her a voicemail with a call back number.

## 2022-05-13 ENCOUNTER — PATIENT OUTREACH (OUTPATIENT)
Dept: ORTHOPEDICS | Facility: CLINIC | Age: 72
End: 2022-05-13
Payer: MEDICARE

## 2022-05-13 NOTE — TELEPHONE ENCOUNTER
I called the patient to complete her Questionnaires. The patient did not answer. I left a voicemail with a call back number.

## 2022-08-29 ENCOUNTER — OFFICE VISIT (OUTPATIENT)
Dept: PODIATRY | Facility: CLINIC | Age: 72
End: 2022-08-29
Payer: MEDICARE

## 2022-08-29 VITALS — BODY MASS INDEX: 35.08 KG/M2 | HEIGHT: 64 IN | WEIGHT: 205.5 LBS

## 2022-08-29 DIAGNOSIS — M20.40 HAMMER TOE, UNSPECIFIED LATERALITY: ICD-10-CM

## 2022-08-29 DIAGNOSIS — E11.49 TYPE II DIABETES MELLITUS WITH NEUROLOGICAL MANIFESTATIONS: Primary | ICD-10-CM

## 2022-08-29 PROCEDURE — 3008F PR BODY MASS INDEX (BMI) DOCUMENTED: ICD-10-PCS | Mod: CPTII,S$GLB,, | Performed by: PODIATRIST

## 2022-08-29 PROCEDURE — 1101F PR PT FALLS ASSESS DOC 0-1 FALLS W/OUT INJ PAST YR: ICD-10-PCS | Mod: CPTII,S$GLB,, | Performed by: PODIATRIST

## 2022-08-29 PROCEDURE — 99999 PR PBB SHADOW E&M-EST. PATIENT-LVL III: ICD-10-PCS | Mod: PBBFAC,,, | Performed by: PODIATRIST

## 2022-08-29 PROCEDURE — 3288F PR FALLS RISK ASSESSMENT DOCUMENTED: ICD-10-PCS | Mod: CPTII,S$GLB,, | Performed by: PODIATRIST

## 2022-08-29 PROCEDURE — 99999 PR PBB SHADOW E&M-EST. PATIENT-LVL III: CPT | Mod: PBBFAC,,, | Performed by: PODIATRIST

## 2022-08-29 PROCEDURE — 1125F PR PAIN SEVERITY QUANTIFIED, PAIN PRESENT: ICD-10-PCS | Mod: CPTII,S$GLB,, | Performed by: PODIATRIST

## 2022-08-29 PROCEDURE — 1101F PT FALLS ASSESS-DOCD LE1/YR: CPT | Mod: CPTII,S$GLB,, | Performed by: PODIATRIST

## 2022-08-29 PROCEDURE — 3052F HG A1C>EQUAL 8.0%<EQUAL 9.0%: CPT | Mod: CPTII,S$GLB,, | Performed by: PODIATRIST

## 2022-08-29 PROCEDURE — 1125F AMNT PAIN NOTED PAIN PRSNT: CPT | Mod: CPTII,S$GLB,, | Performed by: PODIATRIST

## 2022-08-29 PROCEDURE — 1159F MED LIST DOCD IN RCRD: CPT | Mod: CPTII,S$GLB,, | Performed by: PODIATRIST

## 2022-08-29 PROCEDURE — 3052F PR MOST RECENT HEMOGLOBIN A1C LEVEL 8.0 - < 9.0%: ICD-10-PCS | Mod: CPTII,S$GLB,, | Performed by: PODIATRIST

## 2022-08-29 PROCEDURE — 99213 PR OFFICE/OUTPT VISIT, EST, LEVL III, 20-29 MIN: ICD-10-PCS | Mod: S$GLB,,, | Performed by: PODIATRIST

## 2022-08-29 PROCEDURE — 3008F BODY MASS INDEX DOCD: CPT | Mod: CPTII,S$GLB,, | Performed by: PODIATRIST

## 2022-08-29 PROCEDURE — 3288F FALL RISK ASSESSMENT DOCD: CPT | Mod: CPTII,S$GLB,, | Performed by: PODIATRIST

## 2022-08-29 PROCEDURE — 1159F PR MEDICATION LIST DOCUMENTED IN MEDICAL RECORD: ICD-10-PCS | Mod: CPTII,S$GLB,, | Performed by: PODIATRIST

## 2022-08-29 PROCEDURE — 99213 OFFICE O/P EST LOW 20 MIN: CPT | Mod: S$GLB,,, | Performed by: PODIATRIST

## 2022-08-31 NOTE — PROGRESS NOTES
Subjective:      Patient ID: Abigail Pierre is a 72 y.o. female.    Chief Complaint: Diabetes Mellitus ([place] 2/11/22), Callouses, and Foot Pain    Abigail Pierre is a 72 y.o. female who presents to the clinic complaining of bilateral foot pain, L>R.  Plantar heel pain ej and posterior heel pain left, especially with the first step in the morning. The pain is described as Aching, Throbbing, Grabbing and Sharp.    Abigail Pierre rates the pain as 9/10.  The onset of the pain was gradual and has worsened over the past several week(s)   she relates pain began without known inciting event but began following increased pain to the left knee. Prior treatments lidocaine patch with some relief but not covered by insurance    Also complains of painful calluses sub 5th MTPJ ej    History of trauma: denies    Shoe gear: Casual shoes      Hemoglobin A1C   Date Value Ref Range Status   01/21/2022 8.0 (H) 4.0 - 5.6 % Final     Comment:     ADA Screening Guidelines:  5.7-6.4%  Consistent with prediabetes  >or=6.5%  Consistent with diabetes    High levels of fetal hemoglobin interfere with the HbA1C  assay. Heterozygous hemoglobin variants (HbS, HgC, etc)do  not significantly interfere with this assay.   However, presence of multiple variants may affect accuracy.     01/27/2017 7.7 (H) 4.5 - 6.2 % Final     Comment:     According to ADA guidelines, hemoglobin A1C <7.0% represents  optimal control in non-pregnant diabetic patients.  Different  metrics may apply to specific populations.   Standards of Medical Care in Diabetes - 2016.  For the purpose of screening for the presence of diabetes:  <5.7%     Consistent with the absence of diabetes  5.7-6.4%  Consistent with increasing risk for diabetes   (prediabetes)  >or=6.5%  Consistent with diabetes  Currently no consensus exists for use of hemoglobin A1C  for diagnosis of diabetes for children.       Hemoglobin A1c   Date Value Ref Range Status   10/14/2021 7.9 (H) <5.7 % of  total Hgb Final     Comment:     For someone without known diabetes, a hemoglobin A1c  value of 6.5% or greater indicates that they may have   diabetes and this should be confirmed with a follow-up   test.    For someone with known diabetes, a value <7% indicates   that their diabetes is well controlled and a value   greater than or equal to 7% indicates suboptimal   control. A1c targets should be individualized based on   duration of diabetes, age, comorbid conditions, and   other considerations.    Currently, no consensus exists regarding use of  hemoglobin A1c for diagnosis of diabetes for children.       01/08/2021 8.2 (H) <5.7 % of total Hgb Final     Comment:     For someone without known diabetes, a hemoglobin A1c  value of 6.5% or greater indicates that they may have   diabetes and this should be confirmed with a follow-up   test.    For someone with known diabetes, a value <7% indicates   that their diabetes is well controlled and a value   greater than or equal to 7% indicates suboptimal   control. A1c targets should be individualized based on   duration of diabetes, age, comorbid conditions, and   other considerations.    Currently, no consensus exists regarding use of  hemoglobin A1c for diagnosis of diabetes for children.       02/26/2020 8.4 (H) <5.7 % of total Hgb Final     Comment:     For someone without known diabetes, a hemoglobin A1c  value of 6.5% or greater indicates that they may have   diabetes and this should be confirmed with a follow-up   test.    For someone with known diabetes, a value <7% indicates   that their diabetes is well controlled and a value   greater than or equal to 7% indicates suboptimal   control. A1c targets should be individualized based on   duration of diabetes, age, comorbid conditions, and   other considerations.    Currently, no consensus exists regarding use of  hemoglobin A1c for diagnosis of diabetes for children.             Patient Active Problem List    Diagnosis    Carpal tunnel syndrome    Hyperlipidemia    HTN (hypertension)    Primary osteoarthritis of left knee    Asthma    Ductal carcinoma in situ (DCIS) of left breast    Diabetes mellitus type II, uncontrolled    History of colonic polyps    Kidney stones    Obesity    Allergies    Postural dizziness    Snoring    Anemia    Varicose veins of left lower extremity    Decreased range of motion (ROM) of left knee    Muscle weakness of lower extremity    Impaired functional mobility, balance, gait, and endurance       Current Outpatient Medications on File Prior to Visit   Medication Sig Dispense Refill    acetaminophen (TYLENOL) 650 MG TbSR Take 1 tablet (650 mg total) by mouth every 8 (eight) hours. 120 tablet 0    albuterol (PROVENTIL/VENTOLIN HFA) 90 mcg/actuation inhaler Inhale 2 puffs into the lungs every 6 (six) hours as needed for Wheezing. Use with spacer  Dispense with 1 spacer 20.1 g 11    albuterol sulfate 2.5 mg/0.5 mL Nebu Take 2.5 mg by nebulization every 4 (four) hours as needed (As needed for wheezing). Rescue 30 each 0    aspirin (ECOTRIN) 81 MG EC tablet Take 1 tablet (81 mg total) by mouth 2 (two) times a day. 60 tablet 0    atorvastatin (LIPITOR) 20 MG tablet Take 20 mg by mouth once daily.      cefadroxil (DURICEF) 500 MG Cap Take 1 capsule (500 mg total) by mouth every 12 (twelve) hours. 14 capsule 0    celecoxib (CELEBREX) 200 MG capsule TAKE 1 CAPSULE(200 MG) BY MOUTH EVERY DAY 30 capsule 0    docusate sodium (COLACE) 100 MG capsule Take 1 capsule (100 mg total) by mouth 2 (two) times daily. 60 capsule 0    fluticasone propionate (FLONASE) 50 mcg/actuation nasal spray 1 spray (50 mcg total) by Each Nostril route 2 (two) times daily. 16 g 0    glipiZIDE (GLUCOTROL) 5 MG tablet Take 5 mg by mouth daily with breakfast.      ketoconazole (NIZORAL) 2 % cream Apply topically once daily. 1 Tube 3    loratadine (CLARITIN) 10 mg tablet Take 1 tablet (10 mg total) by  mouth once daily. 60 tablet 0    losartan-hydrochlorothiazide 100-25 mg (HYZAAR) 100-25 mg per tablet Take 1 tablet by mouth once daily. 90 tablet 2    metformin (GLUCOPHAGE-XR) 500 MG 24 hr tablet TAKE 2 TABLETS(1000 MG) BY MOUTH TWICE DAILY (Patient taking differently: Take 1,000 mg by mouth 2 (two) times daily with meals.) 120 tablet 0    methocarbamoL (ROBAXIN) 750 MG Tab Take 1 tablet (750 mg total) by mouth 3 (three) times daily as needed (muscle spasm). 50 tablet 0    montelukast (SINGULAIR) 10 mg tablet montelukast Take 1 time per day No date recorded tablet 1 time per day No route recorded No set duration recorded No set duration amount recorded active 10 mg      oxyCODONE (ROXICODONE) 5 MG immediate release tablet Take 1 tablet (5 mg total) by mouth every 4 (four) hours as needed for Pain. 50 tablet 0    pregabalin (LYRICA) 75 MG capsule Take 1 capsule (75 mg total) by mouth 2 (two) times daily. 60 capsule 2    sodium chloride (OCEAN NASAL) 0.65 % nasal spray 1 spray by Nasal route every 3 (three) hours as needed for Congestion. 1 each 0    UNKNOWN TO PATIENT Dayquil       No current facility-administered medications on file prior to visit.       Review of patient's allergies indicates:   Allergen Reactions    Sulfa (sulfonamide antibiotics) Itching and Rash       Past Surgical History:   Procedure Laterality Date    BREAST SURGERY      CHOLECYSTECTOMY      COLONOSCOPY      CYSTOSCOPY W/ LASER LITHOTRIPSY      TOTAL KNEE ARTHROPLASTY Left 2/14/2022    Procedure: ARTHROPLASTY, KNEE, TOTAL: LEFT: DEPUY-SIGMA;  Surgeon: Steven Kapadia III, MD;  Location: [facility];  Service: Orthopedics;  Laterality: Left;       Family History   Problem Relation Age of Onset    Colon cancer Father     Heart disease Mother 50    Hypertension Mother     Diabetes Mother     Hyperlipidemia Mother     Breast cancer Neg Hx     Ovarian cancer Neg Hx        Social History     Socioeconomic History    Marital  "status:    Tobacco Use    Smoking status: Never    Smokeless tobacco: Never   Substance and Sexual Activity    Alcohol use: Never    Drug use: No       Review of Systems   Constitutional: Negative for chills and fever.   Cardiovascular:  Negative for claudication and leg swelling.   Respiratory:  Negative for cough and shortness of breath.    Skin:  Positive for dry skin and nail changes. Negative for itching and rash.   Musculoskeletal:  Positive for arthritis, joint pain, joint swelling and myalgias. Negative for falls and muscle weakness.   Gastrointestinal:  Negative for diarrhea, nausea and vomiting.   Neurological:  Positive for paresthesias. Negative for numbness, tremors and weakness.   Psychiatric/Behavioral:  Negative for altered mental status and hallucinations.          Objective:      Vitals:    08/29/22 1513   Weight: 93.2 kg (205 lb 7.5 oz)   Height: 5' 4" (1.626 m)   PainSc:   9   PainLoc: Foot       Physical Exam  Nursing note reviewed.   Constitutional:       General: She is not in acute distress.     Appearance: She is not toxic-appearing or diaphoretic.   Cardiovascular:      Pulses:           Dorsalis pedis pulses are 2+ on the right side and 2+ on the left side.        Posterior tibial pulses are 2+ on the right side and 2+ on the left side.   Pulmonary:      Effort: No respiratory distress.   Musculoskeletal:      Right ankle: No swelling. No tenderness. No lateral malleolus, medial malleolus, AITF ligament, CF ligament or posterior TF ligament tenderness. Decreased range of motion.      Right Achilles Tendon: No defects. Duran's test negative.      Left ankle: No swelling. No tenderness. No lateral malleolus, medial malleolus, AITF ligament, CF ligament or posterior TF ligament tenderness. Decreased range of motion.      Left Achilles Tendon: Tenderness (pain with palpation of posterior 1/3 calcaneus at region of Achilles tendon insertion) present. No defects. Duran's test " negative.      Right foot: Tenderness present. No bony tenderness.      Left foot: Tenderness present. No bony tenderness.      Comments: Biomechanical exam: Pain on palpation bilateral medial calcaneal tubercle at origin of plantar fascia. There is equinus deformity bilateral with decreased dorsiflexion at the ankle joint bilateral. No tenderness with compression of heel. Negative tinels sign. Gait analysis reveals excessive pronation through midstance and propulsion with early heel off. Shoes reveals lateral heel counter wear bilateral     Decreased first MPJ range of motion both weightbearing and nonweightbearing, no crepitus observed the first MP joint, + dorsal flag sign. Mild  bunion deformity is observed .    Patient has hammertoes of digits 2-5 bilateral partially reducible      Skin:     General: Skin is warm and dry.      Coloration: Skin is not pale.      Findings: Lesion present. No bruising, burn, laceration or rash.      Nails: There is no clubbing.      Comments: Focal hyperkeratotic lesion with painful central core consisting entirely of hyperkeratotic tissue without open skin, drainage, pus, fluctuance, malodor, or signs of infection: sub 5th MTPJ ej  Focal hyperkeratotic lesion consisting entirely of hyperkeratotic tissue without open skin, drainage, pus, fluctuance, malodor, or signs of infection: left plantar foot      Neurological:      Sensory: No sensory deficit.      Motor: No tremor, atrophy or abnormal muscle tone.      Deep Tendon Reflexes: Reflexes are normal and symmetric.   Psychiatric:         Attention and Perception: She is attentive.         Mood and Affect: Mood is not anxious. Affect is not inappropriate.         Speech: She is communicative. Speech is not slurred.         Behavior: Behavior is not combative.             Assessment:       Encounter Diagnoses   Name Primary?    Type II diabetes mellitus with neurological manifestations Yes    Hammer toe, unspecified laterality           Plan:     Problem List Items Addressed This Visit    None  Visit Diagnoses       Type II diabetes mellitus with neurological manifestations    -  Primary    Relevant Orders    DIABETIC SHOES FOR HOME USE    Hammer toe, unspecified laterality        Relevant Orders    DIABETIC SHOES FOR HOME USE          Orders Placed This Encounter    DIABETIC SHOES FOR HOME USE          I counseled the patient on her conditions, their implications and medical management.  - Shoe inspection. Diabetic Foot Education. Patient reminded of the importance of good nutrition and blood sugar control to help prevent podiatric complications of diabetes. Patient instructed on proper foot hygeine. We discussed wearing proper shoe gear, daily foot inspections, never walking without protective shoe gear, caution putting sharp instruments to feet     - Discussed DM foot care:  Wear comfortable, proper fitting shoes. Wash feet daily. Dry well. After drying, apply moisturizer to feet (no lotion to webspaces). Inspect feet daily for skin breaks, blisters, swelling, or redness. Wear cotton socks (preferably white)  Change socks every day. Do NOT walk barefoot. Do NOT use heating pads or warm/hot water soaks     Rx diabetic shoes with custom molded inserts to be worn at all times while ambulating. Prescription provided with list of local retailers.     With the patient's verbal consent a sterile #15 scalpel was used to trim the hyperkeratotic lesion described above.left plantar lateral foot.  She tolerated the procedure well without complication.     F/u one year DM foot exam sooner PRN

## 2022-09-04 NOTE — PROGRESS NOTES
Patient, Abigail Pierre (MRN #[id number]), presented with a recorded BMI of 35.27 kg/m^2 and a documented comorbidity(s):  - Diabetes Mellitus Type 2  to which the severe obesity is a contributing factor. This is consistent with the definition of severe obesity (BMI 35.0-39.9) with comorbidity (ICD-10 E66.01, Z68.35). The patient's severe obesity was monitored, evaluated, addressed and/or treated. This addendum to the medical record is made on 09/04/2022.

## 2022-09-06 ENCOUNTER — TELEPHONE (OUTPATIENT)
Dept: ORTHOPEDICS | Facility: CLINIC | Age: 72
End: 2022-09-06
Payer: MEDICARE

## 2022-09-06 NOTE — TELEPHONE ENCOUNTER
I called and informed the patient that Dr. Kapadia denied her Celebrex rx request. I informed the patient that she can reach out to her pcp for this sense they keep track of her blood pressure and kidney function.     The patient verbalized understanding and has no further questions.

## 2022-09-09 DIAGNOSIS — Z96.652 STATUS POST LEFT KNEE REPLACEMENT: ICD-10-CM

## 2022-09-11 ENCOUNTER — TELEPHONE (OUTPATIENT)
Dept: ORTHOPEDICS | Facility: HOSPITAL | Age: 72
End: 2022-09-11
Payer: MEDICARE

## 2022-09-11 NOTE — TELEPHONE ENCOUNTER
Looks like jody told her that nsaids needed to be managed by her PCP on 9/6, then I got another refill request on 9/9 so not sure why that happened.     She also didn't come in for her 3 month appointment.     Please call and let them know that further NSAID refills need to come from their PCP as that is who is following their blood pressure and kidney function.     If they are having issues they should come in for an evaluation  If they are doing ok, then they should come in for their 1 year eval

## 2022-09-12 ENCOUNTER — TELEPHONE (OUTPATIENT)
Dept: ORTHOPEDICS | Facility: CLINIC | Age: 72
End: 2022-09-12
Payer: MEDICARE

## 2022-09-12 RX ORDER — CELECOXIB 200 MG/1
CAPSULE ORAL
Qty: 90 CAPSULE | Refills: 0 | OUTPATIENT
Start: 2022-09-12

## 2022-09-12 NOTE — TELEPHONE ENCOUNTER
I called the patient to inform her that if she needs a refill on celebrex she should reach out to her pcp since that's who keeps track of her blood pressure and kidney function. Also, I called to schedule the patient for her 1 year eval or if she would like to come in sooner. The patient did not answer. I left a voicemail explaining with my name and call back number.

## 2022-09-12 NOTE — TELEPHONE ENCOUNTER
I called and spoke to Ritu at [facility] and informed her that Dr. Kapadia would like the patient to follow up with her pcp regarding the celebrex rx since that is who keeps track of her blood pressure/kidney function.

## 2023-01-07 ENCOUNTER — HOSPITAL ENCOUNTER (EMERGENCY)
Facility: HOSPITAL | Age: 73
Discharge: HOME | End: 2023-01-08
Attending: INTERNAL MEDICINE
Payer: MEDICARE

## 2023-01-07 VITALS
HEIGHT: 62 IN | RESPIRATION RATE: 18 BRPM | SYSTOLIC BLOOD PRESSURE: 167 MMHG | HEART RATE: 94 BPM | TEMPERATURE: 98 F | BODY MASS INDEX: 36.99 KG/M2 | DIASTOLIC BLOOD PRESSURE: 82 MMHG | WEIGHT: 201 LBS | OXYGEN SATURATION: 98 %

## 2023-01-07 DIAGNOSIS — J06.9 URI WITH COUGH AND CONGESTION: Primary | ICD-10-CM

## 2023-01-07 LAB
CTP QC/QA: YES
INFLUENZA A ANTIGEN, POC: NEGATIVE
INFLUENZA B ANTIGEN, POC: NEGATIVE
SARS-COV-2 RDRP RESP QL NAA+PROBE: NEGATIVE

## 2023-01-07 PROCEDURE — 87635 SARS-COV-2 COVID-19 AMP PRB: CPT | Mod: ER | Performed by: INTERNAL MEDICINE

## 2023-01-07 PROCEDURE — 99284 EMERGENCY DEPT VISIT MOD MDM: CPT | Mod: 25,ER

## 2023-01-07 PROCEDURE — 87502 INFLUENZA DNA AMP PROBE: CPT | Mod: ER

## 2023-01-07 RX ORDER — AZELASTINE 1 MG/ML
2 SPRAY, METERED NASAL 2 TIMES DAILY
Qty: 30 ML | Refills: 0 | Status: SHIPPED | OUTPATIENT
Start: 2023-01-07 | End: 2023-03-03

## 2023-01-07 RX ORDER — FLUTICASONE PROPIONATE 50 MCG
2 SPRAY, SUSPENSION (ML) NASAL DAILY
Qty: 15 G | Refills: 0 | OUTPATIENT
Start: 2023-01-07 | End: 2023-12-14

## 2023-01-08 NOTE — ED PROVIDER NOTES
Encounter Date: 1/7/2023    SCRIBE #1 NOTE: I, NIMA Pastor am scribing for, and in the presence of,  Kalyan Serrano MD. I have scribed the following portions of the note - Other sections scribed: HPI, ROS, PE.     History     Chief Complaint   Patient presents with    URI     Cough, congestion,  and  headache onset yesterday     Abigail Pierre is a 72 y.o. female, with a PMHx of DM, COPD, and Asthma, who presents to the ED with a sore throat and congestion onset 1 day ago. Pt attempted a breathing treatment at 1 pm today to little relief. No other exacerbating or alleviating factors.    The history is provided by the patient. No  was used.   Review of patient's allergies indicates:   Allergen Reactions    Sulfa (sulfonamide antibiotics) Itching and Rash     Past Medical History:   Diagnosis Date    Arthritis     Asthma     Carpal tunnel syndrome 1/2/2014    COPD (chronic obstructive pulmonary disease)     Diabetes mellitus type II, uncontrolled 1/23/2017    Diabetes mellitus, type 2     DJD (degenerative joint disease) of knee 1/2/2014    Ductal carcinoma in situ (DCIS) of left breast 12/22/2016    GERD (gastroesophageal reflux disease)     History of colonic polyps 1/23/2017    Around 2015    Hyperlipidemia     Hypertension     Intrinsic asthma, unspecified 1/2/2014    Kidney stones 1/23/2017    On CT done by GI in 2015 -sent to Dr Sargent?    Periumbilical abdominal pain 1/23/2017    Seen by Cristhian Mena 10/15 -ct, colon and EGD (normal EGD)     Past Surgical History:   Procedure Laterality Date    BREAST SURGERY      CHOLECYSTECTOMY      COLONOSCOPY      CYSTOSCOPY W/ LASER LITHOTRIPSY      TOTAL KNEE ARTHROPLASTY Left 2/14/2022    Procedure: ARTHROPLASTY, KNEE, TOTAL: LEFT: DEPUY-SIGMA;  Surgeon: Steven Kapadia III, MD;  Location: [facility];  Service: Orthopedics;  Laterality: Left;     Family History   Problem Relation Age of Onset    Colon cancer Father     Heart disease Mother 50     Hypertension Mother     Diabetes Mother     Hyperlipidemia Mother     Breast cancer Neg Hx     Ovarian cancer Neg Hx      Social History     Tobacco Use    Smoking status: Never    Smokeless tobacco: Never   Substance Use Topics    Alcohol use: Never    Drug use: No     Review of Systems   Constitutional:  Negative for fever.   HENT:  Positive for congestion and sore throat.    Respiratory:  Negative for shortness of breath.    Cardiovascular:  Negative for chest pain.   Gastrointestinal:  Negative for abdominal pain, diarrhea, nausea and vomiting.   Genitourinary:  Negative for dysuria.   Musculoskeletal:  Negative for back pain.   Skin:  Negative for rash.   Neurological:  Negative for weakness and headaches.   Psychiatric/Behavioral:  Negative for behavioral problems.    All other systems reviewed and are negative.    Physical Exam     Initial Vitals [01/07/23 2307]   BP Pulse Resp Temp SpO2   (!) 167/82 94 18 98.2 °F (36.8 °C) 98 %      MAP       --         Physical Exam    Nursing note and vitals reviewed.  Constitutional: She appears well-developed and well-nourished.   HENT:   Head: Normocephalic and atraumatic.   Enlarged nasal turbinates noted. Clear nasal discharge noted. Oropharyngeal erythema present. No oropharyngeal exudate or edema.    Eyes: Conjunctivae are normal.   Neck: Neck supple.   Normal range of motion.  Cardiovascular:  Normal rate, regular rhythm and normal heart sounds.     Exam reveals no gallop and no friction rub.       No murmur heard.  Pulmonary/Chest: Breath sounds normal. No respiratory distress. She has no wheezes. She has no rhonchi. She has no rales.   Abdominal: Abdomen is soft. There is no abdominal tenderness.   Musculoskeletal:         General: No edema. Normal range of motion.      Cervical back: Normal range of motion and neck supple.     Neurological: She is alert and oriented to person, place, and time. GCS score is 15. GCS eye subscore is 4. GCS verbal subscore is 5. GCS  motor subscore is 6.   Skin: Skin is warm and dry.   Psychiatric: She has a normal mood and affect.       ED Course   Procedures  Labs Reviewed   SARS-COV-2 RDRP GENE    Narrative:     This test utilizes isothermal nucleic acid amplification technology to detect the SARS-CoV-2 RdRp nucleic acid segment. The analytical sensitivity (limit of detection) is 500 copies/swab.     A POSITIVE result is indicative of the presence of SARS-CoV-2 RNA; clinical correlation with patient history and other diagnostic information is necessary to determine patient infection status.    A NEGATIVE result means that SARS-CoV-2 nucleic acids are not present above the limit of detection. A NEGATIVE result should be treated as presumptive. It does not rule out the possibility of COVID-19 and should not be the sole basis for treatment decisions. If COVID-19 is strongly suspected based on clinical and exposure history, re-testing using an alternate molecular assay should be considered.     This test is only for use under the Food and Drug Administration s Emergency Use Authorization (EUA).     Commercial kits are provided by [employer]. Performance characteristics of the EUA have been independently verified by [facility] Department of Pathology and Laboratory Medicine.   _________________________________________________________________   The authorized Fact Sheet for Healthcare Providers and the authorized Fact Sheet for Patients of the ID NOW COVID-19 are available on the FDA website:    https://www.fda.gov/media/118722/download      https://www.fda.gov/media/778246/download      POCT RAPID INFLUENZA A/B          Imaging Results    None          Medications - No data to display  Medical Decision Making:   History:   Old Medical Records: I decided to obtain old medical records.  Initial Assessment:   Abigail Pierre is a 72 y.o. female, with a PMHx of DM, COPD, and Asthma, who presents to the ED with a sore throat and  congestion onset 1 day ago. Pt attempted a breathing treatment at 1 pm today to little relief. No other exacerbating or alleviating factors.  Clinical Tests:   Lab Tests: Ordered and Reviewed  ED Management:  Rapid flu and COVID-19 screens were negative.  Patient was given instructions for acute viral syndrome and received prescriptions for Astelin/fluticasone.  She was advised to follow-up with her primary care physician within the next week for re-evaluation/return to the emergency department if condition worsens.        Scribe Attestation:   Scribe #1: I performed the above scribed service and the documentation accurately describes the services I performed. I attest to the accuracy of the note.                 This document was produced by a scribe under my direction and in my presence. I agree with the content of the note and have made any necessary edits.     Dr. Serrano    01/08/2023 7:29 PM    Clinical Impression:   Final diagnoses:  [J06.9] URI with cough and congestion (Primary)        ED Disposition Condition    Discharge Stable          ED Prescriptions       Medication Sig Dispense Start Date End Date Auth. Provider    azelastine (ASTELIN) 137 mcg (0.1 %) nasal spray 2 sprays (274 mcg total) by Nasal route 2 (two) times daily. 30 mL 1/7/2023 3/3/2023 Kalyan Serrano MD    fluticasone propionate (FLONASE) 50 mcg/actuation nasal spray 2 sprays (100 mcg total) by Each Nostril route once daily. 15 g 1/7/2023 -- Kalyan eSrrano MD          Follow-up Information       Follow up With Specialties Details Why Contact Info    Ovidio Woods MD Internal Medicine Schedule an appointment as soon as possible for a visit in 1 week For reevaluation [address] [facility] [place] [address]  [phone]               Kalyan Serrano MD  01/08/23 1283

## 2023-10-03 ENCOUNTER — HOSPITAL ENCOUNTER (EMERGENCY)
Facility: HOSPITAL | Age: 73
Discharge: HOME | End: 2023-10-03
Attending: EMERGENCY MEDICINE
Payer: MEDICARE

## 2023-10-03 VITALS
DIASTOLIC BLOOD PRESSURE: 73 MMHG | TEMPERATURE: 98 F | HEART RATE: 96 BPM | SYSTOLIC BLOOD PRESSURE: 145 MMHG | RESPIRATION RATE: 18 BRPM | WEIGHT: 201 LBS | BODY MASS INDEX: 35.61 KG/M2 | HEIGHT: 63 IN | OXYGEN SATURATION: 98 %

## 2023-10-03 DIAGNOSIS — M79.672 ACUTE FOOT PAIN, LEFT: ICD-10-CM

## 2023-10-03 DIAGNOSIS — M65.28 CALCIFIC ACHILLES TENDINITIS OF LEFT LOWER EXTREMITY: Primary | ICD-10-CM

## 2023-10-03 LAB
GLUCOSE SERPL-MCNC: 225 MG/DL (ref 70–110)
POCT GLUCOSE: 225 MG/DL (ref 70–110)

## 2023-10-03 PROCEDURE — 63600175 PHARM REV CODE 636 W HCPCS: Mod: ER

## 2023-10-03 PROCEDURE — 82962 GLUCOSE BLOOD TEST: CPT | Mod: ER

## 2023-10-03 PROCEDURE — 96372 THER/PROPH/DIAG INJ SC/IM: CPT

## 2023-10-03 PROCEDURE — 99284 EMERGENCY DEPT VISIT MOD MDM: CPT | Mod: ER

## 2023-10-03 RX ORDER — ACETAMINOPHEN 500 MG
500 TABLET ORAL EVERY 6 HOURS PRN
Qty: 28 TABLET | Refills: 0 | Status: SHIPPED | OUTPATIENT
Start: 2023-10-03 | End: 2023-10-10

## 2023-10-03 RX ORDER — NAPROXEN 500 MG/1
500 TABLET ORAL 2 TIMES DAILY WITH MEALS
Qty: 10 TABLET | Refills: 0 | Status: SHIPPED | OUTPATIENT
Start: 2023-10-03 | End: 2023-10-08

## 2023-10-03 RX ORDER — DEXAMETHASONE SODIUM PHOSPHATE 4 MG/ML
4 INJECTION, SOLUTION INTRA-ARTICULAR; INTRALESIONAL; INTRAMUSCULAR; INTRAVENOUS; SOFT TISSUE
Status: COMPLETED | OUTPATIENT
Start: 2023-10-03 | End: 2023-10-03

## 2023-10-03 RX ADMIN — DEXAMETHASONE SODIUM PHOSPHATE 4 MG: 4 INJECTION, SOLUTION INTRA-ARTICULAR; INTRALESIONAL; INTRAMUSCULAR; INTRAVENOUS; SOFT TISSUE at 04:10

## 2023-10-03 NOTE — DISCHARGE INSTRUCTIONS

## 2023-10-04 NOTE — ED PROVIDER NOTES
Encounter Date: 10/3/2023       History     Chief Complaint   Patient presents with    Foot Pain     Pt reports left foot and heel pain worsening over the past 2 days; reports concern of a heel spur. Hx of left knee replacement last year.      Abigail Pierre is a 73-year-old female with past medical history of left total knee replacement who presents to the emergency department with a chief complaint of left foot and heel pain.  Had similar pain in the other foot years ago that has since resolved.  She was concerned for a heel spur.  Denies any swelling, erythema, or warmth.  She was ambulatory with discomfort.  Denies any numbness, tingling, weakness.    The history is provided by the patient. No  was used.     Review of patient's allergies indicates:   Allergen Reactions    Sulfa (sulfonamide antibiotics) Itching and Rash     Past Medical History:   Diagnosis Date    Arthritis     Asthma     Carpal tunnel syndrome 1/2/2014    COPD (chronic obstructive pulmonary disease)     Diabetes mellitus type II, uncontrolled 1/23/2017    Diabetes mellitus, type 2     DJD (degenerative joint disease) of knee 1/2/2014    Ductal carcinoma in situ (DCIS) of left breast 12/22/2016    GERD (gastroesophageal reflux disease)     History of colonic polyps 1/23/2017    Around 2015    Hyperlipidemia     Hypertension     Intrinsic asthma, unspecified 1/2/2014    Kidney stones 1/23/2017    On CT done by GI in 2015 -sent to Dr Sargent?    Periumbilical abdominal pain 1/23/2017    Seen by Cristhian Mena 10/15 -ct, colon and EGD (normal EGD)     Past Surgical History:   Procedure Laterality Date    BREAST SURGERY      CHOLECYSTECTOMY      COLONOSCOPY      CYSTOSCOPY W/ LASER LITHOTRIPSY      TOTAL KNEE ARTHROPLASTY Left 2/14/2022    Procedure: ARTHROPLASTY, KNEE, TOTAL: LEFT: DEPUY-SIGMA;  Surgeon: Steven Kapadia III, MD;  Location: [facility];  Service: Orthopedics;  Laterality: Left;     Family History   Problem Relation  Age of Onset    Colon cancer Father     Heart disease Mother 50    Hypertension Mother     Diabetes Mother     Hyperlipidemia Mother     Breast cancer Neg Hx     Ovarian cancer Neg Hx      Social History     Tobacco Use    Smoking status: Never    Smokeless tobacco: Never   Substance Use Topics    Alcohol use: Never    Drug use: No     Review of Systems   Constitutional:  Negative for chills and fever.   HENT:  Negative for sore throat.    Respiratory:  Negative for shortness of breath.    Cardiovascular:  Negative for chest pain.   Gastrointestinal:  Negative for nausea and vomiting.   Genitourinary:  Negative for dysuria.   Musculoskeletal:  Positive for arthralgias (Left foot and ankle). Negative for back pain.   Skin:  Negative for rash.   Neurological:  Negative for weakness.   Hematological:  Does not bruise/bleed easily.       Physical Exam     Initial Vitals [10/03/23 1511]   BP Pulse Resp Temp SpO2   (!) 149/78 103 18 98 °F (36.7 °C) 98 %      MAP       --         Physical Exam    Nursing note and vitals reviewed.  Constitutional: Vital signs are normal. She appears well-developed and well-nourished. She is cooperative. She does not appear ill. No distress.   HENT:   Head: Normocephalic and atraumatic.   Right Ear: Hearing and external ear normal.   Left Ear: Hearing and external ear normal.   Nose: Nose normal.   Eyes: Conjunctivae and EOM are normal.   Neck: Phonation normal.   Normal range of motion.  Cardiovascular:  Normal rate and regular rhythm.           No murmur heard.  Pulmonary/Chest: Effort normal. No respiratory distress.   Abdominal: Abdomen is soft. She exhibits no distension. There is no abdominal tenderness.   Musculoskeletal:      Cervical back: Normal range of motion.      Left ankle: Normal range of motion.      Left Achilles Tendon: Tenderness present. Duran's test negative.      Left foot: Tenderness (Plantar fascia) present.      Comments: There is some tenderness to palpation  over the plantar fascia and the Achilles tendon.  There is a small area of swelling on the Achilles tendon.  Negative Duran test.  Patient is ambulatory.  No diffuse swelling or erythema.  DP and PT pulses 2+.     Neurological: She is alert and oriented to person, place, and time. GCS eye subscore is 4. GCS verbal subscore is 5. GCS motor subscore is 6.   Skin: Skin is warm. Capillary refill takes less than 2 seconds.         ED Course   Procedures  Labs Reviewed   POCT GLUCOSE - Abnormal; Notable for the following components:       Result Value    POCT Glucose 225 (*)     All other components within normal limits   POCT GLUCOSE MONITORING CONTINUOUS          Imaging Results              X-Ray Foot Complete Left (Final result)  Result time 10/03/23 15:58:55      Final result by Kahlil Em MD (10/03/23 15:58:55)                   Impression:      Left ankle nonspecific soft tissue swelling greatest medially without acute displaced fracture-dislocation identified, which may represent sprain.      Electronically signed by: Kahlil Em MD  Date:    10/03/2023  Time:    15:58               Narrative:    EXAMINATION:  XR ANKLE COMPLETE 3 VIEW LEFT; XR FOOT COMPLETE 3 VIEW LEFT    CLINICAL HISTORY:  Pain in left foot    TECHNIQUE:  AP, lateral and oblique views of the left ankle and left foot were performed.    COMPARISON:  Left foot series 11/16/2021    FINDINGS:  Prominence of the soft tissues about the ankle, greatest medially.  Ankle mortise otherwise appears intact.  Lisfranc articulation is congruent.  No displaced fracture, dislocation or destructive osseous process.  DJD about the ankle and foot most prominent at the 1st MTP joint, not significantly progressed from prior.  Prominent plantar calcaneal spur and prominent enthesophyte at the Achilles insertion site.  No subcutaneous emphysema or radiodense retained foreign body.                                       X-Ray Ankle Complete Left (Final result)   Result time 10/03/23 15:58:55      Final result by Kahlil Em MD (10/03/23 15:58:55)                   Impression:      Left ankle nonspecific soft tissue swelling greatest medially without acute displaced fracture-dislocation identified, which may represent sprain.      Electronically signed by: Kahlil Em MD  Date:    10/03/2023  Time:    15:58               Narrative:    EXAMINATION:  XR ANKLE COMPLETE 3 VIEW LEFT; XR FOOT COMPLETE 3 VIEW LEFT    CLINICAL HISTORY:  Pain in left foot    TECHNIQUE:  AP, lateral and oblique views of the left ankle and left foot were performed.    COMPARISON:  Left foot series 11/16/2021    FINDINGS:  Prominence of the soft tissues about the ankle, greatest medially.  Ankle mortise otherwise appears intact.  Lisfranc articulation is congruent.  No displaced fracture, dislocation or destructive osseous process.  DJD about the ankle and foot most prominent at the 1st MTP joint, not significantly progressed from prior.  Prominent plantar calcaneal spur and prominent enthesophyte at the Achilles insertion site.  No subcutaneous emphysema or radiodense retained foreign body.                                    X-Rays:   Independently Interpreted Readings:   Other Readings:  X-ray foot and ankle left:  Bones are well mineralized.  Joint spaces are well-maintained.  Questionable soft tissue swelling.  No acute fractures or dislocations.  No radiopaque foreign bodies.  Significant calcaneal spur, some calcification of the Achilles tendon.    Medications   dexAMETHasone injection 4 mg (4 mg Intramuscular Given 10/3/23 9488)     Medical Decision Making  73-year-old female presenting to the emergency department with atraumatic left heel and foot pain.  On physical exam, she was clinically well-appearing and in no acute distress.  There is a small area of swelling and tenderness over the left Achilles tendon.  Neurovascularly intact.  No signs of infectious process.  No convincing signs  of DVT.  Presentation is consistent with chronic changes of the left foot.  I will refer this patient to Podiatry.  Discussed conservative management including naproxen and Tylenol which were electronically prescribed and sent to the patient's preferred pharmacy.  Also discussed rest, ice, compression, and elevation.    Amount and/or Complexity of Data Reviewed  Labs: ordered.     Details: POCT glucose 225 mg/dL.  Radiology: ordered and independent interpretation performed. Decision-making details documented in ED Course.    Risk  OTC drugs.  Prescription drug management.                               Clinical Impression:   Final diagnoses:  [M79.672] Acute foot pain, left  [M65.28] Calcific Achilles tendinitis of left lower extremity (Primary)        ED Disposition Condition    Discharge Stable          ED Prescriptions       Medication Sig Dispense Start Date End Date Auth. Provider    acetaminophen (TYLENOL) 500 MG tablet Take 1 tablet (500 mg total) by mouth every 6 (six) hours as needed. 28 tablet 10/3/2023 10/10/2023 Jenaro Purdy, PA-C    naproxen (NAPROSYN) 500 MG tablet Take 1 tablet (500 mg total) by mouth 2 (two) times daily with meals. for 5 days 10 tablet 10/3/2023 10/8/2023 Jenaro Purdy, PA-C          Follow-up Information       Follow up With Specialties Details Why Contact Info    Ovidio Woods MD Internal Medicine Schedule an appointment as soon as possible for a visit  As needed, If symptoms worsen [address] [facility] [address] [phone]      [facility] ED Emergency Medicine Go to  If symptoms worsen [address] [facility] [address] [phone]             Jenaro Purdy, PA-C  10/03/23 210

## 2023-12-14 ENCOUNTER — HOSPITAL ENCOUNTER (EMERGENCY)
Facility: HOSPITAL | Age: 73
Discharge: HOME | End: 2023-12-14
Attending: EMERGENCY MEDICINE
Payer: MEDICARE

## 2023-12-14 VITALS
OXYGEN SATURATION: 95 % | TEMPERATURE: 98 F | DIASTOLIC BLOOD PRESSURE: 75 MMHG | RESPIRATION RATE: 20 BRPM | WEIGHT: 190 LBS | HEIGHT: 62 IN | SYSTOLIC BLOOD PRESSURE: 156 MMHG | HEART RATE: 91 BPM | BODY MASS INDEX: 34.96 KG/M2

## 2023-12-14 DIAGNOSIS — R73.9 HYPERGLYCEMIA: ICD-10-CM

## 2023-12-14 DIAGNOSIS — J45.901 EXACERBATION OF ASTHMA, UNSPECIFIED ASTHMA SEVERITY, UNSPECIFIED WHETHER PERSISTENT: Primary | ICD-10-CM

## 2023-12-14 DIAGNOSIS — R07.9 CHEST PAIN: ICD-10-CM

## 2023-12-14 DIAGNOSIS — N39.0 ACUTE URINARY TRACT INFECTION: ICD-10-CM

## 2023-12-14 DIAGNOSIS — J06.9 VIRAL URI WITH COUGH: ICD-10-CM

## 2023-12-14 DIAGNOSIS — E86.0 DEHYDRATION: ICD-10-CM

## 2023-12-14 PROBLEM — J30.9 ALLERGIC RHINITIS: Status: ACTIVE | Noted: 2019-01-29

## 2023-12-14 PROBLEM — E55.9 VITAMIN D DEFICIENCY: Chronic | Status: ACTIVE | Noted: 2018-12-18

## 2023-12-14 PROBLEM — I10 BENIGN ESSENTIAL HYPERTENSION: Status: ACTIVE | Noted: 2019-01-29

## 2023-12-14 PROBLEM — E78.2 HYPERLIPIDEMIA, MIXED: Status: ACTIVE | Noted: 2019-06-14

## 2023-12-14 PROBLEM — D05.12 BREAST NEOPLASM, TIS (DCIS), LEFT: Chronic | Status: ACTIVE | Noted: 2018-12-18

## 2023-12-14 LAB
ALBUMIN SERPL-MCNC: 3.8 G/DL (ref 3.3–5.5)
ALP SERPL-CCNC: 71 U/L (ref 42–141)
BILIRUB SERPL-MCNC: 0.8 MG/DL (ref 0.2–1.6)
BILIRUBIN, POC UA: NEGATIVE
BLOOD, POC UA: NEGATIVE
BUN SERPL-MCNC: 18 MG/DL (ref 7–22)
CALCIUM SERPL-MCNC: 9.9 MG/DL (ref 8–10.3)
CHLORIDE SERPL-SCNC: 103 MMOL/L (ref 98–108)
CLARITY, POC UA: CLEAR
COLOR, POC UA: YELLOW
CREAT SERPL-MCNC: 1.5 MG/DL (ref 0.6–1.2)
CTP QC/QA: YES
GLUCOSE SERPL-MCNC: 233 MG/DL (ref 73–118)
GLUCOSE, POC UA: ABNORMAL
INFLUENZA A ANTIGEN, POC: NEGATIVE
INFLUENZA B ANTIGEN, POC: NEGATIVE
KETONES, POC UA: NEGATIVE
LEUKOCYTE EST, POC UA: ABNORMAL
NITRITE, POC UA: NEGATIVE
PH UR STRIP: 5.5 [PH]
POC ALT (SGPT): 38 U/L (ref 10–47)
POC AST (SGOT): 43 U/L (ref 11–38)
POC B-TYPE NATRIURETIC PEPTIDE: 25.3 PG/ML (ref 0–100)
POC CARDIAC TROPONIN I: 0.01 NG/ML (ref 0–0.08)
POC PTINR: 1.2 (ref 0.9–1.2)
POC PTWBT: 14.4 SEC (ref 9.7–14.3)
POC RAPID STREP A: NEGATIVE
POC TCO2: 28 MMOL/L (ref 18–33)
POTASSIUM BLD-SCNC: 4 MMOL/L (ref 3.6–5.1)
PROTEIN, POC UA: ABNORMAL
PROTEIN, POC: 8.7 G/DL (ref 6.4–8.1)
SAMPLE: ABNORMAL
SAMPLE: NORMAL
SARS-COV-2 RDRP RESP QL NAA+PROBE: NEGATIVE
SODIUM BLD-SCNC: 140 MMOL/L (ref 128–145)
SPECIFIC GRAVITY, POC UA: 1.02
UROBILINOGEN, POC UA: 0.2 E.U./DL

## 2023-12-14 PROCEDURE — 85025 COMPLETE CBC W/AUTO DIFF WBC: CPT | Mod: ER

## 2023-12-14 PROCEDURE — 83880 ASSAY OF NATRIURETIC PEPTIDE: CPT | Mod: ER

## 2023-12-14 PROCEDURE — 93010 EKG 12-LEAD: ICD-10-PCS | Mod: ,,, | Performed by: INTERNAL MEDICINE

## 2023-12-14 PROCEDURE — 63600175 PHARM REV CODE 636 W HCPCS: Mod: ER | Performed by: EMERGENCY MEDICINE

## 2023-12-14 PROCEDURE — 87880 STREP A ASSAY W/OPTIC: CPT | Mod: ER

## 2023-12-14 PROCEDURE — 96361 HYDRATE IV INFUSION ADD-ON: CPT | Mod: ER

## 2023-12-14 PROCEDURE — 96374 THER/PROPH/DIAG INJ IV PUSH: CPT | Mod: ER

## 2023-12-14 PROCEDURE — 93010 ELECTROCARDIOGRAM REPORT: CPT | Mod: ,,, | Performed by: INTERNAL MEDICINE

## 2023-12-14 PROCEDURE — 87635 SARS-COV-2 COVID-19 AMP PRB: CPT | Mod: ER | Performed by: EMERGENCY MEDICINE

## 2023-12-14 PROCEDURE — 93005 ELECTROCARDIOGRAM TRACING: CPT | Mod: ER

## 2023-12-14 PROCEDURE — 25000242 PHARM REV CODE 250 ALT 637 W/ HCPCS: Mod: ER | Performed by: EMERGENCY MEDICINE

## 2023-12-14 PROCEDURE — 81003 URINALYSIS AUTO W/O SCOPE: CPT | Mod: ER

## 2023-12-14 PROCEDURE — 94640 AIRWAY INHALATION TREATMENT: CPT | Mod: ER

## 2023-12-14 PROCEDURE — 84484 ASSAY OF TROPONIN QUANT: CPT | Mod: ER

## 2023-12-14 PROCEDURE — 25000003 PHARM REV CODE 250: Mod: ER | Performed by: EMERGENCY MEDICINE

## 2023-12-14 PROCEDURE — 80053 COMPREHEN METABOLIC PANEL: CPT | Mod: ER

## 2023-12-14 PROCEDURE — 99285 EMERGENCY DEPT VISIT HI MDM: CPT | Mod: 25,ER

## 2023-12-14 PROCEDURE — 87804 INFLUENZA ASSAY W/OPTIC: CPT | Mod: ER

## 2023-12-14 RX ORDER — IBUPROFEN 600 MG/1
600 TABLET ORAL EVERY 6 HOURS PRN
Qty: 20 TABLET | Refills: 0 | Status: SHIPPED | OUTPATIENT
Start: 2023-12-14

## 2023-12-14 RX ORDER — AMOXICILLIN AND CLAVULANATE POTASSIUM 875; 125 MG/1; MG/1
1 TABLET, FILM COATED ORAL 2 TIMES DAILY
Qty: 20 TABLET | Refills: 0 | Status: SHIPPED | OUTPATIENT
Start: 2023-12-14 | End: 2023-12-24

## 2023-12-14 RX ORDER — IPRATROPIUM BROMIDE AND ALBUTEROL SULFATE 2.5; .5 MG/3ML; MG/3ML
3 SOLUTION RESPIRATORY (INHALATION) ONCE
Status: COMPLETED | OUTPATIENT
Start: 2023-12-14 | End: 2023-12-14

## 2023-12-14 RX ORDER — PREDNISONE 20 MG/1
40 TABLET ORAL DAILY
Qty: 10 TABLET | Refills: 0 | Status: SHIPPED | OUTPATIENT
Start: 2023-12-14 | End: 2023-12-19

## 2023-12-14 RX ORDER — FLUTICASONE PROPIONATE 50 MCG
1 SPRAY, SUSPENSION (ML) NASAL 2 TIMES DAILY
Qty: 16 G | Refills: 0 | Status: SHIPPED | OUTPATIENT
Start: 2023-12-14

## 2023-12-14 RX ORDER — BENZONATATE 200 MG/1
200 CAPSULE ORAL 3 TIMES DAILY PRN
Qty: 20 CAPSULE | Refills: 0 | Status: SHIPPED | OUTPATIENT
Start: 2023-12-14 | End: 2023-12-24

## 2023-12-14 RX ORDER — ALBUTEROL SULFATE 90 UG/1
2 AEROSOL, METERED RESPIRATORY (INHALATION) EVERY 6 HOURS PRN
Qty: 18 G | Refills: 0 | Status: SHIPPED | OUTPATIENT
Start: 2023-12-14 | End: 2024-12-13

## 2023-12-14 RX ORDER — ASPIRIN 325 MG
325 TABLET ORAL
Status: COMPLETED | OUTPATIENT
Start: 2023-12-14 | End: 2023-12-14

## 2023-12-14 RX ORDER — ALBUTEROL SULFATE 2.5 MG/.5ML
2.5 SOLUTION RESPIRATORY (INHALATION) EVERY 4 HOURS PRN
Qty: 30 EACH | Refills: 0 | Status: SHIPPED | OUTPATIENT
Start: 2023-12-14 | End: 2024-12-13

## 2023-12-14 RX ORDER — ACETAMINOPHEN 500 MG
500 TABLET ORAL EVERY 6 HOURS PRN
Qty: 30 TABLET | Refills: 0 | Status: SHIPPED | OUTPATIENT
Start: 2023-12-14

## 2023-12-14 RX ORDER — LORATADINE 10 MG/1
10 TABLET ORAL DAILY
Qty: 60 TABLET | Refills: 0 | Status: SHIPPED | OUTPATIENT
Start: 2023-12-14 | End: 2024-12-13

## 2023-12-14 RX ORDER — DEXAMETHASONE SODIUM PHOSPHATE 4 MG/ML
8 INJECTION, SOLUTION INTRA-ARTICULAR; INTRALESIONAL; INTRAMUSCULAR; INTRAVENOUS; SOFT TISSUE
Status: COMPLETED | OUTPATIENT
Start: 2023-12-14 | End: 2023-12-14

## 2023-12-14 RX ADMIN — DEXAMETHASONE SODIUM PHOSPHATE 8 MG: 4 INJECTION INTRA-ARTICULAR; INTRALESIONAL; INTRAMUSCULAR; INTRAVENOUS; SOFT TISSUE at 12:12

## 2023-12-14 RX ADMIN — ASPIRIN 325 MG ORAL TABLET 325 MG: 325 PILL ORAL at 10:12

## 2023-12-14 RX ADMIN — SODIUM CHLORIDE 1000 ML: 9 INJECTION, SOLUTION INTRAVENOUS at 12:12

## 2023-12-14 RX ADMIN — SODIUM CHLORIDE 1000 ML: 9 INJECTION, SOLUTION INTRAVENOUS at 10:12

## 2023-12-14 RX ADMIN — IPRATROPIUM BROMIDE AND ALBUTEROL SULFATE 3 ML: .5; 3 SOLUTION RESPIRATORY (INHALATION) at 12:12

## 2023-12-14 NOTE — ED NOTES
Patient presents to ed with c/o SOB for two days. Patient has of asthma. Denies home 02 use. Reports prod cough for a few days. Pt 100% on RA. Cardiac monitor placed on patient. VS stable. Patient denies chest pain. Patient has no other c/o at this time. Will cont to monitor.

## 2023-12-14 NOTE — ED PROVIDER NOTES
Encounter Date: 12/14/2023    SCRIBE #1 NOTE: I, Gely Murray, am scribing for, and in the presence of,  Lynette Templeton DO.       History     Chief Complaint   Patient presents with    Cough    Shortness of Breath     Pt presents with SOB x 2days dyspnea omn exertion, intermittent subjective fever with chills x 2 days. Pt has hx of asthma      74 yo F with a PMHx of DM, HTN, HLD, asthma, presenting to the ED for chest pain for the last 2 days. She reports nonradiating midsternal chest pain, sharp in character, intermittent and exacerbated w/ coughing spells, currently rating 9/10 in severity. Associated sx include sore throat, chills, cough, headache, rhinorrhea, congestion, sinus pain and pressure, decreased appetite, wheezing, dyspnea. Attempted Tx w/ willi gold and zyrtec. Did not attempt breathing Tx at home as she ran out of her Rx. No other exacerbating or alleviating factors. Denies leg swelling, NVD, or other associated symptoms.     The history is provided by the patient.     Review of patient's allergies indicates:   Allergen Reactions    Sulfa (sulfonamide antibiotics) Itching and Rash     Past Medical History:   Diagnosis Date    Arthritis     Asthma     Carpal tunnel syndrome 1/2/2014    COPD (chronic obstructive pulmonary disease)     Diabetes mellitus type II, uncontrolled 1/23/2017    Diabetes mellitus, type 2     DJD (degenerative joint disease) of knee 1/2/2014    Ductal carcinoma in situ (DCIS) of left breast 12/22/2016    GERD (gastroesophageal reflux disease)     History of colonic polyps 1/23/2017    Around 2015    Hyperlipidemia     Hypertension     Intrinsic asthma, unspecified 1/2/2014    Kidney stones 1/23/2017    On CT done by GI in 2015 -sent to Dr Sargent?    Periumbilical abdominal pain 1/23/2017    Seen by Cristhian Mena 10/15 -ct, colon and EGD (normal EGD)     Past Surgical History:   Procedure Laterality Date    BREAST SURGERY      CHOLECYSTECTOMY      COLONOSCOPY      CYSTOSCOPY W/  LASER LITHOTRIPSY      TOTAL KNEE ARTHROPLASTY Left 2/14/2022    Procedure: ARTHROPLASTY, KNEE, TOTAL: LEFT: DEPUY-SIGMA;  Surgeon: Steven Kapadia III, MD;  Location: [facility];  Service: Orthopedics;  Laterality: Left;     Family History   Problem Relation Age of Onset    Colon cancer Father     Heart disease Mother 50    Hypertension Mother     Diabetes Mother     Hyperlipidemia Mother     Breast cancer Neg Hx     Ovarian cancer Neg Hx      Social History     Tobacco Use    Smoking status: Never    Smokeless tobacco: Never   Substance Use Topics    Alcohol use: Never    Drug use: No     Review of Systems   Constitutional:  Positive for appetite change and chills. Negative for fever.   HENT:  Positive for congestion, sinus pressure, sinus pain and sore throat. Negative for rhinorrhea.    Eyes:  Negative for visual disturbance.   Respiratory:  Positive for cough and wheezing. Negative for shortness of breath.    Cardiovascular:  Positive for chest pain. Negative for leg swelling.   Gastrointestinal:  Negative for abdominal pain, diarrhea, nausea and vomiting.   Genitourinary:  Negative for dysuria, frequency and hematuria.   Musculoskeletal:  Negative for back pain.   Skin:  Negative for rash.   Neurological:  Positive for headaches. Negative for dizziness and weakness.   All other systems reviewed and are negative.      Physical Exam     Initial Vitals [12/14/23 0947]   BP Pulse Resp Temp SpO2   97/74 (!) 124 (!) 24 98.1 °F (36.7 °C) 100 %      MAP       --         Physical Exam    Nursing note and vitals reviewed.  Constitutional: She appears well-developed and well-nourished. No distress.   HENT:   Head: Normocephalic and atraumatic.   Right Ear: External ear normal.   Left Ear: External ear normal.   Nose: Nose normal.   Eyes: EOM are normal. Pupils are equal, round, and reactive to light. Right eye exhibits no discharge. Left eye exhibits no discharge. No scleral icterus.   Neck: Neck supple.   Normal range of  motion.  Cardiovascular:  Regular rhythm, normal heart sounds and intact distal pulses.   Tachycardia present.   Exam reveals no gallop and no friction rub.       No murmur heard.  Pulmonary/Chest: No respiratory distress. She has wheezes.   Expiratory wheezing  Diminished breath sounds bl   Abdominal: Abdomen is soft. There is no abdominal tenderness. There is no rebound and no guarding.   Musculoskeletal:         General: No tenderness or edema. Normal range of motion.      Cervical back: Normal range of motion and neck supple.     Neurological: She is alert and oriented to person, place, and time. She has normal strength. GCS score is 15. GCS eye subscore is 4. GCS verbal subscore is 5. GCS motor subscore is 6.   Skin: Skin is warm and dry. Capillary refill takes less than 2 seconds.   Psychiatric: She has a normal mood and affect. Thought content normal.         ED Course   Critical Care    Date/Time: 12/14/2023 3:04 PM    Performed by: Lynette Templeton DO  Authorized by: Lynette Templeton DO  Direct patient critical care time: 10 minutes  Additional history critical care time: 10 minutes  Ordering / reviewing critical care time: 10 minutes  Documentation critical care time: 10 minutes  Total critical care time (exclusive of procedural time) : 40 minutes  Critical care was necessary to treat or prevent imminent or life-threatening deterioration of the following conditions: cardiac failure, circulatory failure and dehydration.  Critical care was time spent personally by me on the following activities: discussions with consultants, development of treatment plan with patient or surrogate, examination of patient, ordering and performing treatments and interventions, re-evaluation of patient's condition, ordering and review of laboratory studies, obtaining history from patient or surrogate, evaluation of patient's response to treatment and review of old charts.        Labs Reviewed   POCT URINALYSIS W/O SCOPE - Abnormal;  Notable for the following components:       Result Value    Glucose, UA 2+ (*)     Protein, UA 1+ (*)     Leukocytes, UA Trace (*)     All other components within normal limits   ISTAT PROCEDURE - Abnormal; Notable for the following components:    POC PTWBT 14.4 (*)     All other components within normal limits   POCT CMP - Abnormal; Notable for the following components:    AST (SGOT), POC 43 (*)     POC Creatinine 1.5 (*)     POC Glucose 233 (*)     Protein, POC 8.7 (*)     All other components within normal limits   CULTURE, URINE   TROPONIN ISTAT   SARS-COV-2 RDRP GENE    Narrative:     This test utilizes isothermal nucleic acid amplification technology to detect the SARS-CoV-2 RdRp nucleic acid segment. The analytical sensitivity (limit of detection) is 500 copies/swab.     A POSITIVE result is indicative of the presence of SARS-CoV-2 RNA; clinical correlation with patient history and other diagnostic information is necessary to determine patient infection status.    A NEGATIVE result means that SARS-CoV-2 nucleic acids are not present above the limit of detection. A NEGATIVE result should be treated as presumptive. It does not rule out the possibility of COVID-19 and should not be the sole basis for treatment decisions. If COVID-19 is strongly suspected based on clinical and exposure history, re-testing using an alternate molecular assay should be considered.     This test is only for use under the Food and Drug Administration s Emergency Use Authorization (EUA).     Commercial kits are provided by [employer]. Performance characteristics of the EUA have been independently verified by [facility] Department of Pathology and Laboratory Medicine.   _________________________________________________________________   The authorized Fact Sheet for Healthcare Providers and the authorized Fact Sheet for Patients of the ID NOW COVID-19 are available on the FDA website:     https://www.fda.gov/media/134957/download      https://www.fda.gov/media/230393/download      POCT CBC   POCT CMP   POCT PROTIME-INR   POCT TROPONIN   POCT B-TYPE NATRIURETIC PEPTIDE (BNP)   POCT STREP A, RAPID   POCT RAPID INFLUENZA A/B   POCT B-TYPE NATRIURETIC PEPTIDE (BNP)          Imaging Results              X-Ray Chest PA And Lateral (Final result)  Result time 12/14/23 11:06:29      Final result by Danilo Hopkins MD (12/14/23 11:06:29)                   Impression:      No acute abnormality.      Electronically signed by: Danilo Hopkins  Date:    12/14/2023  Time:    11:06               Narrative:    EXAMINATION:  XR CHEST PA AND LATERAL    CLINICAL HISTORY:  Chest Pain;    TECHNIQUE:  PA and lateral views of the chest were performed.    COMPARISON:  Chest radiograph 01/22/2022    FINDINGS:  Lines and tubes: None.    Heart and mediastinum: Normal in size.  Calcifications of the aortic arch.    Pleura: No pleural effusion or pneumothorax.    Lungs: Lungs are well inflated. No focal consolidations or evidence of pulmonary edema.    Soft tissue/bone: Degenerative changes in the spine and shoulders.  Cholecystectomy clips.                                       Medications   sodium chloride 0.9% bolus 1,000 mL 1,000 mL (0 mLs Intravenous Stopped 12/14/23 1154)   aspirin tablet 325 mg (325 mg Oral Given 12/14/23 1028)   albuterol-ipratropium 2.5 mg-0.5 mg/3 mL nebulizer solution 3 mL (3 mLs Nebulization Given 12/14/23 1234)   dexAMETHasone injection 8 mg (8 mg Intravenous Given 12/14/23 1244)   sodium chloride 0.9% bolus 1,000 mL 1,000 mL (0 mLs Intravenous Stopped 12/14/23 1400)     Medical Decision Making  Amount and/or Complexity of Data Reviewed  Labs: ordered.  Radiology: ordered.  ECG/medicine tests:  Decision-making details documented in ED Course.    Risk  OTC drugs.  Prescription drug management.      Medical Decision Making:    This is an evaluation of a 73 y.o. female that presents to the Emergency  Department for   Chief Complaint   Patient presents with    Cough    Shortness of Breath     Pt presents with SOB x 2days dyspnea omn exertion, intermittent subjective fever with chills x 2 days. Pt has hx of asthma        Independent historian: (parent/ EMS/ spouse/ etc) n/a    The patient is a non-toxic and well appearing patient. Patient is tolerating PO without difficulty. Physical exam otherwise as above.     I have reviewed vital signs and nursing notes.   Vital Signs Are Reassuring.     Based on the patient's symptoms, I am considering and evaluating for the following differential diagnoses: viral illness, flu, covid, asthma, asthma exacerbation, cardiac arrhythmia, STEMI, NSTEMI, hypoglycemia, PNA.     Consider hospitalization for: chest pain    Patient is agreeable to transfer and admission to [facility]    ED Course:Treatment in the ED included Physical Exam and medications given in ED  Medications   sodium chloride 0.9% bolus 1,000 mL 1,000 mL (0 mLs Intravenous Stopped 12/14/23 1154)   aspirin tablet 325 mg (325 mg Oral Given 12/14/23 1028)   albuterol-ipratropium 2.5 mg-0.5 mg/3 mL nebulizer solution 3 mL (3 mLs Nebulization Given 12/14/23 1234)   dexAMETHasone injection 8 mg (8 mg Intravenous Given 12/14/23 1244)   sodium chloride 0.9% bolus 1,000 mL 1,000 mL (0 mLs Intravenous Stopped 12/14/23 1400)   .   Patient reports feeling better after treatment in the ER.       External Data/Documents Reviewed: Previous medical records and vital signs reviewed, see HPI and Physical exam.   Labs: ordered and reviewed.  COVID negative.  Trace leukocytes  Radiology: ordered as indicated and reviewed.  No pneumothorax  ECG/medicine tests: ordered and independent interpretation performed by Dr. Lynette Templeton DO. Decision-making details documented in ED Course.   Cardiac monitor placed for chest pain. Monitor shows Normal Sinus Rhythm. Interpreted by Dr. Lynette Templeton DO.    Risk  Diagnosis or treatment significantly  limited by the following social determinants of health: Body mass index is 34.75 kg/m².     In shared decision making with the patient, we discussed treatment, prescriptions, labs, and imaging results.    Discharge home with   ED Prescriptions       Medication Sig Dispense Start Date End Date Auth. Provider    loratadine (CLARITIN) 10 mg tablet Take 1 tablet (10 mg total) by mouth once daily. 60 tablet 12/14/2023 12/13/2024 Lynette Templeton DO    fluticasone propionate (FLONASE) 50 mcg/actuation nasal spray 1 spray (50 mcg total) by Each Nostril route 2 (two) times daily. 16 g 12/14/2023 -- Lynette Templeton DO    acetaminophen (TYLENOL) 500 MG tablet Take 1 tablet (500 mg total) by mouth every 6 (six) hours as needed for Pain (and fever). 30 tablet 12/14/2023 -- Lynette Templeton DO    ibuprofen (ADVIL,MOTRIN) 600 MG tablet Take 1 tablet (600 mg total) by mouth every 6 (six) hours as needed for Pain (Take with food as needed for mild-to-moderate pain). 20 tablet 12/14/2023 -- Lynette Templeton DO    amoxicillin-clavulanate 875-125mg (AUGMENTIN) 875-125 mg per tablet Take 1 tablet by mouth 2 (two) times daily. for 10 days 20 tablet 12/14/2023 12/24/2023 Lynette Templeton DO    benzonatate (TESSALON) 200 MG capsule Take 1 capsule (200 mg total) by mouth 3 (three) times daily as needed. 20 capsule 12/14/2023 12/24/2023 Lynette Templeton DO    predniSONE (DELTASONE) 20 MG tablet Take 2 tablets (40 mg total) by mouth once daily. for 5 days 10 tablet 12/14/2023 12/19/2023 Lynette Templeton DO    albuterol (PROVENTIL/VENTOLIN HFA) 90 mcg/actuation inhaler Inhale 2 puffs into the lungs every 6 (six) hours as needed for Wheezing. Use with spacer  Dispense with 1 spacer 18 g 12/14/2023 12/13/2024 Lynette Templeton DO    albuterol sulfate 2.5 mg/0.5 mL Nebu Take 2.5 mg by nebulization every 4 (four) hours as needed (As needed for shortness of breath). Rescue 30 each 12/14/2023 12/13/2024 Lynette Templeton, DO          Fill and take prescriptions as  directed.  Return to the ED if symptoms worsen or do not resolve.   Answered questions and discussed discharge plan.    Patient feels better and is ready for discharge.  Follow up with PCP/specialist in 1 day      At time of discharge patient is awake alert oriented x4 speaking clearly in full sentences and moving all 4 extremities.     The following labs and imaging were reviewed:    Insert CBC here     Admission on 12/14/2023, Discharged on 12/14/2023   Component Date Value Ref Range Status    POC Rapid COVID 12/14/2023 Negative  Negative Final     Acceptable 12/14/2023 Yes   Final    POC PTWBT 12/14/2023 14.4 (H)  9.7 - 14.3 sec Final    POC PTINR 12/14/2023 1.2  0.9 - 1.2 Final    Sample 12/14/2023 unknown   Final    POC Rapid Strep A 12/14/2023 negative  Positive/Negative Final    Albumin, POC 12/14/2023 3.8  3.3 - 5.5 g/dL Final    Alkaline Phosphatase, POC 12/14/2023 71  42 - 141 U/L Final    ALT (SGPT), POC 12/14/2023 38  10 - 47 U/L Final    AST (SGOT), POC 12/14/2023 43 (H)  11 - 38 U/L Final    POC BUN 12/14/2023 18  7 - 22 mg/dL Final    Calcium, POC 12/14/2023 9.9  8.0 - 10.3 mg/dL Final    POC Chloride 12/14/2023 103  98 - 108 mmol/L Final    POC Creatinine 12/14/2023 1.5 (H)  0.6 - 1.2 mg/dL Final    POC Glucose 12/14/2023 233 (H)  73 - 118 mg/dL Final    POC Potassium 12/14/2023 4.0  3.6 - 5.1 mmol/L Final    POC Sodium 12/14/2023 140  128 - 145 mmol/L Final    Bilirubin, POC 12/14/2023 0.8  0.2 - 1.6 mg/dL Final    POC TCO2 12/14/2023 28  18 - 33 mmol/L Final    Protein, POC 12/14/2023 8.7 (H)  6.4 - 8.1 g/dL Final    Influenza B Ag 12/14/2023 negative  Positive/Negative Final    Inflenza A Ag 12/14/2023 negative  Positive/Negative Final    POC B-Type Natriuretic Peptide 12/14/2023 25.3  0.0 - 100.0 pg/mL Final    POC Cardiac Troponin I 12/14/2023 0.01  0.00 - 0.08 ng/mL Final    Sample 12/14/2023 unknown   Final    Comment: A single negative troponin is insufficient to rule out  myocardial infarction.  The use of a serial sampling protocol is recommended practice. Correlate results with reference intervals established for methodology used. Point of care and core laboratory   troponin results are not interchangeable.      Glucose, UA 12/14/2023 2+ (A)   Final    Bilirubin, UA 12/14/2023 Negative   Final    Ketones, UA 12/14/2023 Negative   Final    Spec Grav UA 12/14/2023 1.020   Final    Blood, UA 12/14/2023 Negative   Final    PH, UA 12/14/2023 5.5   Final    Protein, UA 12/14/2023 1+ (A)   Final    Urobilinogen, UA 12/14/2023 0.2  E.U./dL Final    Nitrite, UA 12/14/2023 Negative   Final    Leukocytes, UA 12/14/2023 Trace (A)   Final    Color, UA 12/14/2023 Yellow   Final    Clarity, UA 12/14/2023 Clear   Final        Imaging Results              X-Ray Chest PA And Lateral (Final result)  Result time 12/14/23 11:06:29      Final result by Danilo Hopkins MD (12/14/23 11:06:29)                   Impression:      No acute abnormality.      Electronically signed by: Danilo Hopkins  Date:    12/14/2023  Time:    11:06               Narrative:    EXAMINATION:  XR CHEST PA AND LATERAL    CLINICAL HISTORY:  Chest Pain;    TECHNIQUE:  PA and lateral views of the chest were performed.    COMPARISON:  Chest radiograph 01/22/2022    FINDINGS:  Lines and tubes: None.    Heart and mediastinum: Normal in size.  Calcifications of the aortic arch.    Pleura: No pleural effusion or pneumothorax.    Lungs: Lungs are well inflated. No focal consolidations or evidence of pulmonary edema.    Soft tissue/bone: Degenerative changes in the spine and shoulders.  Cholecystectomy clips.                                                Scribe Attestation:   Scribe #1: I performed the above scribed service and the documentation accurately describes the services I performed. I attest to the accuracy of the note.        ED Course as of 12/14/23 1901   Thu Dec 14, 2023   1120 EKG 12-lead  EKG independently interpreted by   Jareth  Sinus tachycardia rate of 101 bpm  Nml axis  Abnormal EKG  Qtc 471   When compared to previous EKG done on 1/22/22 rate has increased by 2 bpm [JL]      ED Course User Index  [JL] Gely Murray, Dr. Lynette Templeton, personally performed the services described in this documentation. This document was produced by a scribe under my direction and in my presence. All medical record entries made by the scribe were at my direction and in my presence.  I have reviewed the chart and agree that the record reflects my personal performance and is accurate and complete. Lynette Templeton DO.     12/14/2023 7:03 PM      Clinical Impression:  Final diagnoses:  [R07.9] Chest pain  [J45.901] Exacerbation of asthma, unspecified asthma severity, unspecified whether persistent (Primary)  [J06.9] Viral URI with cough  [N39.0] Acute urinary tract infection  [R73.9] Hyperglycemia  [E86.0] Dehydration          ED Disposition Condition    Discharge Stable          ED Prescriptions       Medication Sig Dispense Start Date End Date Auth. Provider    loratadine (CLARITIN) 10 mg tablet Take 1 tablet (10 mg total) by mouth once daily. 60 tablet 12/14/2023 12/13/2024 Lynette Templeton DO    fluticasone propionate (FLONASE) 50 mcg/actuation nasal spray 1 spray (50 mcg total) by Each Nostril route 2 (two) times daily. 16 g 12/14/2023 -- Lynette Templeton DO    acetaminophen (TYLENOL) 500 MG tablet Take 1 tablet (500 mg total) by mouth every 6 (six) hours as needed for Pain (and fever). 30 tablet 12/14/2023 -- Lynette Templeton DO    ibuprofen (ADVIL,MOTRIN) 600 MG tablet Take 1 tablet (600 mg total) by mouth every 6 (six) hours as needed for Pain (Take with food as needed for mild-to-moderate pain). 20 tablet 12/14/2023 -- Lynette Templeton DO    amoxicillin-clavulanate 875-125mg (AUGMENTIN) 875-125 mg per tablet Take 1 tablet by mouth 2 (two) times daily. for 10 days 20 tablet 12/14/2023 12/24/2023 Lynette Templeton DO     benzonatate (TESSALON) 200 MG capsule Take 1 capsule (200 mg total) by mouth 3 (three) times daily as needed. 20 capsule 12/14/2023 12/24/2023 Lynette Templeton DO    predniSONE (DELTASONE) 20 MG tablet Take 2 tablets (40 mg total) by mouth once daily. for 5 days 10 tablet 12/14/2023 12/19/2023 Lynette Templeton DO    albuterol (PROVENTIL/VENTOLIN HFA) 90 mcg/actuation inhaler Inhale 2 puffs into the lungs every 6 (six) hours as needed for Wheezing. Use with spacer  Dispense with 1 spacer 18 g 12/14/2023 12/13/2024 Lynette Templeton DO    albuterol sulfate 2.5 mg/0.5 mL Nebu Take 2.5 mg by nebulization every 4 (four) hours as needed (As needed for shortness of breath). Rescue 30 each 12/14/2023 12/13/2024 Lynette Templeton DO          Follow-up Information       Follow up With Specialties Details Why Contact Info    Ovidio Woods MD Internal Medicine Schedule an appointment as soon as possible for a visit in 1 day  [address]  [place] [address]  [phone]      [facility] - Emergency Dept Emergency Medicine Go to  Please go to [facility] emergency department if symptoms worsen [address]  [facility] [place] [address] [phone]             Lynette Templeton DO  12/14/23 6552

## 2024-08-26 ENCOUNTER — TELEPHONE (OUTPATIENT)
Dept: ORTHOPEDICS | Facility: CLINIC | Age: 74
End: 2024-08-26

## 2024-08-26 NOTE — TELEPHONE ENCOUNTER
Spoke to Brandie from [facility] about the message they left, they need a antibiotic protocol sheet faxed over to them in order for pt to be seen for her appt with them today. Informed Brandie I am about to fax it over now, using the fax number given [phone].     Sincerely,  Marly Jarrett, [facility]   Certified Clinical Medical Assistant to Dr. Steven Kapadia [facility]  Phone: [phone]  Fax: [phone]      ----- Message from Blaine Ambriz sent at 8/26/2024  9:39 AM CDT -----  Good morning,   Can you please call and review the dental antibiotic protocol with her?     Sincerely,   Meng Ambriz MS, OTC  OR & Clinical Assistant to Dr. Steven Kapadia III  Phone: [phone]  Fax: [phone]  ----- Message -----  From: Tahira Story  Sent: 8/26/2024   9:20 AM CDT  To: [facility]ANT[facility] Staff    Type:  Patient Returning Call    Who Called:[facility]aspreet [facility]   Who Left Message for Patient:  Does the patient know what this is regarding?:appt   Would the patient rather a call back or a response via [facility]? Call   Best Call Back Number: fax   Additional Information: states she would like to speak with office

## 2024-10-04 ENCOUNTER — HOSPITAL ENCOUNTER (EMERGENCY)
Facility: HOSPITAL | Age: 74
Discharge: HOME | End: 2024-10-04
Attending: EMERGENCY MEDICINE
Payer: MEDICARE

## 2024-10-04 VITALS
OXYGEN SATURATION: 98 % | HEIGHT: 62 IN | TEMPERATURE: 99 F | SYSTOLIC BLOOD PRESSURE: 146 MMHG | DIASTOLIC BLOOD PRESSURE: 65 MMHG | HEART RATE: 97 BPM | WEIGHT: 196 LBS | BODY MASS INDEX: 36.07 KG/M2 | RESPIRATION RATE: 18 BRPM

## 2024-10-04 DIAGNOSIS — R05.9 COUGH: ICD-10-CM

## 2024-10-04 DIAGNOSIS — L23.5 ALLERGIC DERMATITIS DUE TO OTHER CHEMICAL PRODUCT: Primary | ICD-10-CM

## 2024-10-04 LAB
CTP QC/QA: YES
INFLUENZA A ANTIGEN, POC: NEGATIVE
INFLUENZA B ANTIGEN, POC: NEGATIVE
OHS QRS DURATION: 72 MS
OHS QTC CALCULATION: 486 MS
POCT GLUCOSE: 166 MG/DL (ref 70–110)
SARS-COV-2 RDRP RESP QL NAA+PROBE: NEGATIVE

## 2024-10-04 PROCEDURE — 99284 EMERGENCY DEPT VISIT MOD MDM: CPT | Mod: 25,ER

## 2024-10-04 PROCEDURE — 63600175 PHARM REV CODE 636 W HCPCS: Mod: ER

## 2024-10-04 PROCEDURE — 82962 GLUCOSE BLOOD TEST: CPT | Mod: ER

## 2024-10-04 PROCEDURE — 87804 INFLUENZA ASSAY W/OPTIC: CPT | Mod: 59,ER

## 2024-10-04 PROCEDURE — 87635 SARS-COV-2 COVID-19 AMP PRB: CPT | Mod: ER

## 2024-10-04 PROCEDURE — 93010 ELECTROCARDIOGRAM REPORT: CPT | Mod: ,,, | Performed by: INTERNAL MEDICINE

## 2024-10-04 PROCEDURE — 96372 THER/PROPH/DIAG INJ SC/IM: CPT

## 2024-10-04 PROCEDURE — 93005 ELECTROCARDIOGRAM TRACING: CPT | Mod: ER

## 2024-10-04 RX ORDER — DEXAMETHASONE SODIUM PHOSPHATE 4 MG/ML
8 INJECTION, SOLUTION INTRA-ARTICULAR; INTRALESIONAL; INTRAMUSCULAR; INTRAVENOUS; SOFT TISSUE
Status: COMPLETED | OUTPATIENT
Start: 2024-10-04 | End: 2024-10-04

## 2024-10-04 RX ORDER — FAMOTIDINE 20 MG/1
20 TABLET, FILM COATED ORAL 2 TIMES DAILY
Qty: 20 TABLET | Refills: 0 | Status: SHIPPED | OUTPATIENT
Start: 2024-10-04 | End: 2024-10-14

## 2024-10-04 RX ORDER — HYDROCORTISONE 1 %
CREAM (GRAM) TOPICAL
Qty: 30 G | Refills: 0 | Status: SHIPPED | OUTPATIENT
Start: 2024-10-04

## 2024-10-04 RX ORDER — BENZONATATE 100 MG/1
100 CAPSULE ORAL 3 TIMES DAILY PRN
Qty: 20 CAPSULE | Refills: 0 | Status: SHIPPED | OUTPATIENT
Start: 2024-10-04 | End: 2024-10-14

## 2024-10-04 RX ORDER — DIPHENHYDRAMINE HCL 25 MG
25 CAPSULE ORAL EVERY 6 HOURS PRN
Qty: 20 CAPSULE | Refills: 0 | Status: SHIPPED | OUTPATIENT
Start: 2024-10-04

## 2024-10-04 RX ADMIN — DEXAMETHASONE SODIUM PHOSPHATE 8 MG: 4 INJECTION INTRA-ARTICULAR; INTRALESIONAL; INTRAMUSCULAR; INTRAVENOUS; SOFT TISSUE at 10:10

## 2024-10-04 NOTE — ED PROVIDER NOTES
Encounter Date: 10/4/2024    SCRIBE #1 NOTE: I, Emerald Zelaya, am scribing for, and in the presence of,  Harley Gamino PA-C. I have scribed the following portions of the note - Other sections scribed: HPI, ROS, PE.       History     Chief Complaint   Patient presents with    Rash    Cough     A 75 y/o female presents to the ER c/o rash to bilateral hands and back x 2 days accompanied with a cough x 2 days. Denies SOB Spo2 98     Patient is a 74 y.o. female with a past medical history of asthma, COPD, DMII, HTN, and HLD who presents to the Emergency Department for evaluation of a painful itchy rash to her hands, abdomen, and lower back x 2 days. Patient notes she recently started using a new laundry detergent prior to onset of rash. She also reports symptoms of dry cough and sore throat for 3-4 days. She states this cough is different from her asthma flare ups. She has not taken any medications for the symptoms. She denies fever or chills. Denies nausea or vomiting. Denies chest pain or shortness of breath.     The history is provided by the patient. No  was used.     Review of patient's allergies indicates:   Allergen Reactions    Sulfa (sulfonamide antibiotics) Itching and Rash     Past Medical History:   Diagnosis Date    Arthritis     Asthma     Carpal tunnel syndrome 1/2/2014    COPD (chronic obstructive pulmonary disease)     Diabetes mellitus type II, uncontrolled 1/23/2017    Diabetes mellitus, type 2     DJD (degenerative joint disease) of knee 1/2/2014    Ductal carcinoma in situ (DCIS) of left breast 12/22/2016    GERD (gastroesophageal reflux disease)     History of colonic polyps 1/23/2017    Around 2015    Hyperlipidemia     Hypertension     Intrinsic asthma, unspecified 1/2/2014    Kidney stones 1/23/2017    On CT done by GI in 2015 -sent to Dr Sargent?    Periumbilical abdominal pain 1/23/2017    Seen by Cristhian Mena 10/15 -ct, colon and EGD (normal EGD)     Past Surgical  History:   Procedure Laterality Date    BREAST SURGERY      CHOLECYSTECTOMY      COLONOSCOPY      CYSTOSCOPY W/ LASER LITHOTRIPSY      TOTAL KNEE ARTHROPLASTY Left 2/14/2022    Procedure: ARTHROPLASTY, KNEE, TOTAL: LEFT: DEPUY-SIGMA;  Surgeon: Steven Kapadia III, MD;  Location: [facility];  Service: Orthopedics;  Laterality: Left;     Family History   Problem Relation Name Age of Onset    Colon cancer Father      Heart disease Mother  50    Hypertension Mother      Diabetes Mother      Hyperlipidemia Mother      Breast cancer Neg Hx      Ovarian cancer Neg Hx       Social History     Tobacco Use    Smoking status: Never    Smokeless tobacco: Never   Substance Use Topics    Alcohol use: Never    Drug use: No     Review of Systems   Constitutional:  Negative for chills and fever.   HENT:  Positive for sore throat. Negative for congestion, ear pain, rhinorrhea and trouble swallowing.    Respiratory:  Positive for cough. Negative for shortness of breath.    Cardiovascular:  Negative for chest pain.   Gastrointestinal:  Negative for abdominal pain, nausea and vomiting.   Musculoskeletal:  Negative for neck pain and neck stiffness.   Skin:  Positive for rash (painful and itchy, to hands, abdomen, and lower back).   Neurological:  Negative for dizziness, light-headedness and headaches.       Physical Exam     Initial Vitals [10/04/24 0930]   BP Pulse Resp Temp SpO2   (!) 152/73 (!) 117 18 98.6 °F (37 °C) 99 %      MAP       --         Physical Exam    Nursing note and vitals reviewed.  Constitutional: She appears well-developed and well-nourished.   HENT:   Head: Normocephalic and atraumatic.   Right Ear: External ear normal.   Left Ear: External ear normal.   No posterior oropharyngeal erythema, no oropharyngeal exudates, no tonsillar swelling, uvula is midline.  No trismus on exam.  Patient is tolerating secretions without difficulty.  Patient is speaking in full sentences without difficulty.   Neck: Carotid bruit is not  present.   Normal range of motion.  Cardiovascular:  Regular rhythm, normal heart sounds and intact distal pulses.   Tachycardia present.   Exam reveals no gallop and no friction rub.       No murmur heard.  Pulmonary/Chest: Breath sounds normal. No respiratory distress. She has no wheezes. She has no rhonchi. She has no rales.   Abdominal: Abdomen is soft. Bowel sounds are normal. She exhibits no distension. There is no abdominal tenderness. There is no rebound and no guarding.   Musculoskeletal:         General: Normal range of motion.      Cervical back: Normal range of motion.     Neurological: She is alert and oriented to person, place, and time. GCS score is 15. GCS eye subscore is 4. GCS verbal subscore is 5. GCS motor subscore is 6.   Skin:   Erythematous hives to left abdomen and wrists bilaterally with palmar erythema. 2+ peripheral pulses.  Patient is speaking in full sentences.  Tolerating secretions without difficulty.  No obvious respiratory distress.  Normal range of motion of neck.   Psychiatric: She has a normal mood and affect.         ED Course   Procedures  Labs Reviewed   POCT GLUCOSE - Abnormal       Result Value    POCT Glucose 166 (*)    SARS-COV-2 RDRP GENE    POC Rapid COVID Negative       Acceptable Yes      Narrative:     This test utilizes isothermal nucleic acid amplification technology to detect the SARS-CoV-2 RdRp nucleic acid segment. The analytical sensitivity (limit of detection) is 500 copies/swab.     A POSITIVE result is indicative of the presence of SARS-CoV-2 RNA; clinical correlation with patient history and other diagnostic information is necessary to determine patient infection status.    A NEGATIVE result means that SARS-CoV-2 nucleic acids are not present above the limit of detection. A NEGATIVE result should be treated as presumptive. It does not rule out the possibility of COVID-19 and should not be the sole basis for treatment decisions. If COVID-19 is  strongly suspected based on clinical and exposure history, re-testing using an alternate molecular assay should be considered.     Commercial kits are provided by [employer].       POCT INFLUENZA A/B MOLECULAR   POCT GLUCOSE MONITORING CONTINUOUS   POCT RAPID INFLUENZA A/B    Influenza B Ag negative      Inflenza A Ag negative       EKG Readings: (Independently Interpreted)   Initial Reading: No STEMI. Rhythm: Sinus Tachycardia.   EKG showing sinus tachycardia, ventricular rate of 111, normal QTC, no acute STEMI.  Signed by Dr. Flores.       Imaging Results              X-Ray Chest PA And Lateral (Final result)  Result time 10/04/24 10:00:49      Final result by Tam Sagastume DO (10/04/24 10:00:49)                   Impression:      See above      Electronically signed by: Tam Sagastume DO  Date:    10/04/2024  Time:    10:00               Narrative:    EXAMINATION:  XR CHEST PA AND LATERAL    CLINICAL HISTORY:  Cough, unspecified    TECHNIQUE:  PA and lateral views of the chest were performed.    COMPARISON:  12/14/2023    FINDINGS:  No new lung opacity.  No lung consolidation..  No pleural effusion or pneumothorax.  Heart size within normal limits.  Continued atherosclerotic aorta.  Degenerative change of the visualized spine and shoulder joints.                                       Medications   dexAMETHasone injection 8 mg (8 mg Intramuscular Given 10/4/24 1030)     Medical Decision Making  This is an emergent evaluation of a  74 y.o. female with a past medical history of asthma, COPD, DMII, HTN, and HLD who presents to the Emergency Department for evaluation of a painful itchy rash to her hands, abdomen, and lower back x 2 days. Patient notes she recently started using a new laundry detergent prior to onset of rash. She also reports symptoms of dry cough and sore throat for 3-4 days.    Patient looks well clinically. Erythematous hives to left abdomen and wrists bilaterally with palmar erythema. 2+  peripheral pulses.  Patient is speaking in full sentences.  Tolerating secretions without difficulty.  No obvious respiratory distress.  Normal range of motion of neck. There is no posterior oropharyngeal erythema, no tonsillar swelling, no oropharyngeal exudates, uvula is midline. Normal dentition. No trismus.  No muffled voice. No submandibular swelling. Patient is tolerating secretions without difficulty.  Patient is speaking in full sentences on exam without difficulty.  Bilateral tympanic membranes are pearly gray without erythema, bulging, perforation.  There is no postauricular swelling, or overlying erythema or tenderness to palpation over mastoids bilaterally.  Regular rhythm, slightly tachycardic, without murmurs.  No carotid bruits appreciated on exam. Lungs are clear to auscultation bilaterally.  Abdomen is soft, nontender, non distended, with normal bowel sounds.     Differential diagnosis includes but is not limited to COVID, flu, pneumonia, bronchitis, asthma flare, other viral syndrome, allergic reaction, contact dermatitis, cellulitis.  Considered but doubt angioedema/anaphylaxis.    Workup initiated with viral swabs, EKG, chest x-ray.  Ordered Decadron.  Vital signs, chart, labs, and/or imaging were all reviewed.  See ED course below and interpretations above. My overall impression is contact dermatitis. Will discharge home with Tessalon Perles, Pepcid, Benadryl, hydrocortisone cream.  Instructed patient to discontinue use of new laundry detergent and to follow-up with primary care. Patient is very well appearing, and in no acute distress. Vital signs are reassuring here in the emergency department, patient is afebrile, breathing comfortable, satting 97 % on room air. Patient/Caregiver is stable for discharge at this time.  Patient/Caregiver was informed of results and plan of care. Patient/Caregiver verbalized understanding of care plan. All questions and concerns were addressed. Discussed strict  return precautions with the patient/caregiver. Instructed follow up with primary care provider within 1 week.      Harley Gamino PA-C    DISCLAIMER: This note was prepared with [employer] voice recognition transcription software. Garbled syntax, mangled pronouns, and other bizarre constructions may be attributed to that software system.       Amount and/or Complexity of Data Reviewed  Labs: ordered. Decision-making details documented in ED Course.  Radiology: ordered. Decision-making details documented in ED Course.  ECG/medicine tests: ordered and independent interpretation performed. Decision-making details documented in ED Course.    Risk  OTC drugs.  Prescription drug management.            Scribe Attestation:   Scribe #1: I performed the above scribed service and the documentation accurately describes the services I performed. I attest to the accuracy of the note.        ED Course as of 10/04/24 1103   Fri Oct 04, 2024   0934 BP(!): 152/73 [TM]   0934 Temp: 98.6 °F (37 °C) [TM]   0934 Pulse(!): 117 [TM]   0934 Resp: 18 [TM]   0934 SpO2: 99 % [TM]   0946 Upon chart review, on 4/10/2024 in care everywhere patient hemoglobin A1C of 9%. [TM]   1017 POCT glucose(!)  166.  We will give Decadron. [TM]   1017 EKG showing sinus tachycardia, ventricular rate of 111, normal QTC, no acute infarction.  Signed by Dr. Flores. [TM]   1018 Spoke with nurse once again about temperature rechecked given patient is tachycardic with URI symptoms. [TM]   1021 X-Ray Chest PA And Lateral  No new lung opacity.  No lung consolidation..  No pleural effusion or pneumothorax.  Heart size within normal limits.  Continued atherosclerotic aorta.  Degenerative change of the visualized spine and shoulder joints.      [TM]   1021 Temp: 98.7 °F (37.1 °C) [TM]   1021 Pulse: 106 [TM]   1049 POCT COVID-19 Rapid Screening  COVID negative. [TM]   1049 POCT Rapid Influenza A/B  Flu negative. [TM]   1054 Informed patient of results. [TM]   1057 BP(!): 157/88  [TM]   1057 Pulse: 96 [TM]   1058 Suspect viral syndrome, contact dermatitis.  Will discharge home with Pepcid, Benadryl.  Patient was given long-acting steroid and ED.  We will avoid any further steroids given uncontrolled diabetes.  Instructed her to discontinue using new laundry detergent that caused her rash.  We will have her follow closely with her primary care doctor.  Patient comfortable with plan.  All questions and concerns were addressed. [TM]      ED Course User Index  [TM] Harley Gamino PA-C I, Trevor Marler PA-C, personally performed the services described in this documentation. All medical record entries made by the scribe were at my direction and in my presence. I have reviewed the chart and agree that the record reflects my personal performance and is accurate and complete.      DISCLAIMER: This note was prepared with [employer] voice recognition transcription software. Garbled syntax, mangled pronouns, and other bizarre constructions may be attributed to that software system.    Clinical Impression:  Final diagnoses:  [R05.9] Cough  [L23.5] Allergic dermatitis due to other chemical product (Primary)          ED Disposition Condition    Discharge Stable          ED Prescriptions       Medication Sig Dispense Start Date End Date Auth. Provider    benzonatate (TESSALON) 100 MG capsule Take 1 capsule (100 mg total) by mouth 3 (three) times daily as needed. 20 capsule 10/4/2024 10/14/2024 Harley Gamino PA-C    famotidine (PEPCID) 20 MG tablet Take 1 tablet (20 mg total) by mouth 2 (two) times daily. for 10 days 20 tablet 10/4/2024 10/14/2024 Harley Gamino PA-C    diphenhydrAMINE (BENADRYL) 25 mg capsule Take 1 capsule (25 mg total) by mouth every 6 (six) hours as needed for Itching or Allergies. 20 capsule 10/4/2024 -- Harley Gamino PA-C    hydrocortisone 1 % cream Apply to affected area 2 times daily 30 g 10/4/2024 -- Harley Gamino PA-C          Follow-up  Information       Follow up With Specialties Details Why Contact Info    [facility] ED Emergency Medicine Go to  As needed, If symptoms worsen, or new symptoms develop [address] [facility] [id number] [phone]    Primary care doctor  Schedule an appointment as soon as possible for a visit in 3 days               Harley Gamino PA-C  10/04/24 1106

## 2024-10-04 NOTE — DISCHARGE INSTRUCTIONS
You were seen in the emergency department today for cough, skin rash.  Please take all medications as prescribed and as we discussed.  Follow-up with specialist if instructed to do so.  It is important to remember that some problems are difficult to diagnose and may not be found during your Emergency Department visit. Be sure to follow up with your primary care doctor and review all labs/imaging/tests that were performed during this visit with them. Some labs/tests may be outside of the normal range and require non-emergent follow-up and further investigation to help diagnose/exclude/prevent complications or other medical conditions. Return to the emergency department for any new or worsening symptoms. Thank you for allowing me to care for you today, it was my pleasure. I hope you get to feeling better soon!

## 2024-12-28 ENCOUNTER — HOSPITAL ENCOUNTER (EMERGENCY)
Facility: HOSPITAL | Age: 74
Discharge: HOME | End: 2024-12-28
Attending: EMERGENCY MEDICINE
Payer: MEDICARE

## 2024-12-28 VITALS
SYSTOLIC BLOOD PRESSURE: 195 MMHG | HEART RATE: 91 BPM | DIASTOLIC BLOOD PRESSURE: 86 MMHG | BODY MASS INDEX: 36.62 KG/M2 | HEIGHT: 62 IN | TEMPERATURE: 98 F | OXYGEN SATURATION: 100 % | WEIGHT: 199 LBS | RESPIRATION RATE: 18 BRPM

## 2024-12-28 DIAGNOSIS — U07.1 COVID-19 VIRUS DETECTED: ICD-10-CM

## 2024-12-28 DIAGNOSIS — R05.9 COUGH: ICD-10-CM

## 2024-12-28 DIAGNOSIS — R53.1 WEAKNESS: ICD-10-CM

## 2024-12-28 DIAGNOSIS — U07.1 COVID-19 VIRUS INFECTION: Primary | ICD-10-CM

## 2024-12-28 LAB
CTP QC/QA: YES
INFLUENZA A ANTIGEN, POC: NEGATIVE
INFLUENZA B ANTIGEN, POC: NEGATIVE
SARS-COV-2 RDRP RESP QL NAA+PROBE: POSITIVE

## 2024-12-28 PROCEDURE — 87804 INFLUENZA ASSAY W/OPTIC: CPT | Mod: ER

## 2024-12-28 PROCEDURE — 87635 SARS-COV-2 COVID-19 AMP PRB: CPT | Mod: ER | Performed by: EMERGENCY MEDICINE

## 2024-12-28 PROCEDURE — 93010 ELECTROCARDIOGRAM REPORT: CPT | Mod: ,,, | Performed by: INTERNAL MEDICINE

## 2024-12-28 PROCEDURE — 99284 EMERGENCY DEPT VISIT MOD MDM: CPT | Mod: 25,ER

## 2024-12-28 PROCEDURE — 93005 ELECTROCARDIOGRAM TRACING: CPT | Mod: ER

## 2024-12-28 RX ORDER — GUAIFENESIN AND DEXTROMETHORPHAN HYDROBROMIDE 1200; 60 MG/1; MG/1
1 TABLET, EXTENDED RELEASE ORAL EVERY 12 HOURS
Qty: 14 TABLET | Refills: 0 | Status: SHIPPED | OUTPATIENT
Start: 2024-12-28 | End: 2025-01-04

## 2024-12-28 RX ORDER — AZELASTINE 1 MG/ML
1 SPRAY, METERED NASAL 2 TIMES DAILY
Qty: 30 ML | Refills: 0 | Status: SHIPPED | OUTPATIENT
Start: 2024-12-28 | End: 2025-12-28

## 2024-12-28 RX ORDER — PREDNISONE 20 MG/1
40 TABLET ORAL DAILY
Qty: 10 TABLET | Refills: 0 | Status: SHIPPED | OUTPATIENT
Start: 2024-12-28 | End: 2025-01-02

## 2024-12-28 RX ORDER — BENZONATATE 200 MG/1
200 CAPSULE ORAL 3 TIMES DAILY PRN
Qty: 30 CAPSULE | Refills: 0 | Status: SHIPPED | OUTPATIENT
Start: 2024-12-28 | End: 2025-01-07

## 2024-12-28 NOTE — ED PROVIDER NOTES
Encounter Date: 12/28/2024    SCRIBE #1 NOTE: I, Zeeleslie Monroe, am scribing for, and in the presence of,  Bhavana Flores MD.       History     Chief Complaint   Patient presents with    Cough     Productive cough, chest congestion. Symptoms x 2 days. Patient reports taking tylenol and breathing treatments with no relief.      73 yo F with PMHx of breast cancer (in remission 8 years) asthma, COPD, DMII, HTN, and HLD presents to the ED with URI symptoms x2 days. She reports her symptoms began with an asthma flare with SOB and wheezing, and then she developed a sore throat, chills, and congestion. She reports a near-syncopal episode in the ED today; she felt lightheaded while checking in. Lightheadedness is resolved at this time. She has attempted Tx with breathing Tx's and tylenol at home without relief. No known sick contacts recently but states she went to a [employer] party a few days ago with 40 people. No other exacerbating or alleviating factors. Denies N/V/D, fever, CP, SOB, or other associated symptoms.     The history is provided by the patient. No  was used.     Review of patient's allergies indicates:   Allergen Reactions    Sulfa (sulfonamide antibiotics) Itching and Rash     Past Medical History:   Diagnosis Date    Arthritis     Asthma     Carpal tunnel syndrome 1/2/2014    COPD (chronic obstructive pulmonary disease)     Diabetes mellitus type II, uncontrolled 1/23/2017    Diabetes mellitus, type 2     DJD (degenerative joint disease) of knee 1/2/2014    Ductal carcinoma in situ (DCIS) of left breast 12/22/2016    GERD (gastroesophageal reflux disease)     History of colonic polyps 1/23/2017    Around 2015    Hyperlipidemia     Hypertension     Intrinsic asthma, unspecified 1/2/2014    Kidney stones 1/23/2017    On CT done by GI in 2015 -sent to Dr Sargent?    Periumbilical abdominal pain 1/23/2017    Seen by Cristhian Mena 10/15 -ct, colon and EGD (normal EGD)     Past Surgical History:    Procedure Laterality Date    BREAST SURGERY      CHOLECYSTECTOMY      COLONOSCOPY      CYSTOSCOPY W/ LASER LITHOTRIPSY      TOTAL KNEE ARTHROPLASTY Left 2/14/2022    Procedure: ARTHROPLASTY, KNEE, TOTAL: LEFT: DEPUY-SIGMA;  Surgeon: Steven Kapadia III, MD;  Location: [facility];  Service: Orthopedics;  Laterality: Left;     Family History   Problem Relation Name Age of Onset    Colon cancer Father      Heart disease Mother  50    Hypertension Mother      Diabetes Mother      Hyperlipidemia Mother      Breast cancer Neg Hx      Ovarian cancer Neg Hx       Social History     Tobacco Use    Smoking status: Never    Smokeless tobacco: Never   Substance Use Topics    Alcohol use: Never    Drug use: No     Review of Systems   Constitutional:  Positive for chills. Negative for activity change, appetite change and fever.   HENT:  Positive for sore throat. Negative for congestion, rhinorrhea and sneezing.    Respiratory:  Positive for cough, shortness of breath and wheezing. Negative for choking.    Cardiovascular:  Negative for chest pain and palpitations.   Gastrointestinal:  Negative for abdominal pain, diarrhea, nausea and vomiting.   Musculoskeletal:  Positive for myalgias.   Neurological:  Negative for dizziness, syncope and headaches.        (+) near-syncopal episode   All other systems reviewed and are negative.      Physical Exam     Initial Vitals [12/28/24 1040]   BP Pulse Resp Temp SpO2   (!) 170/92 (!) 114 18 98.8 °F (37.1 °C) 96 %      MAP       --         Physical Exam    Nursing note and vitals reviewed.  Constitutional: She appears well-developed and well-nourished. No distress.   HENT:   Head: Normocephalic and atraumatic.   Eyes: Conjunctivae are normal.   Neck:   Normal range of motion.  Cardiovascular:  Normal rate, regular rhythm and normal heart sounds.           No murmur heard.  Pulmonary/Chest: Breath sounds normal. No respiratory distress. She has no wheezes.   Abdominal: Abdomen is soft. Bowel  sounds are normal. She exhibits no distension. There is no abdominal tenderness.   Musculoskeletal:         General: No tenderness or edema. Normal range of motion.      Cervical back: Normal range of motion.     Neurological: She is alert and oriented to person, place, and time. GCS score is 15. GCS eye subscore is 4. GCS verbal subscore is 5. GCS motor subscore is 6.   Skin: Skin is warm and dry.   Psychiatric: She has a normal mood and affect. Her behavior is normal.         ED Course   Procedures  Labs Reviewed   SARS-COV-2 RDRP GENE - Abnormal       Result Value    POC Rapid COVID Positive (*)      Acceptable Yes     POCT INFLUENZA A/B MOLECULAR   POCT RAPID INFLUENZA A/B    Influenza B Ag negative      Inflenza A Ag negative       EKG Readings: (Independently Interpreted)   Initial Reading: No STEMI. Rhythm: Sinus Tachycardia. Heart Rate: 103. Ectopy: No Ectopy. Conduction: Normal. ST Segments: Normal ST Segments. T Waves: Normal. Clinical Impression: Sinus Tachycardia Other Impression: independently interpreted by me       Imaging Results              X-Ray Chest PA And Lateral (Final result)  Result time 12/28/24 12:55:46      Final result by Kahlil Em MD (12/28/24 12:55:46)                   Impression:      No radiographic evidence of pneumonia or other source of cough/shortness of breath, noting that early/mild viral pneumonia or nonspecific bronchitis may be radiographically occult.      Electronically signed by: Kahlil Em MD  Date:    12/28/2024  Time:    12:55               Narrative:    EXAMINATION:  XR CHEST PA AND LATERAL    CLINICAL HISTORY:  Cough, unspecified    TECHNIQUE:  PA and lateral views of the chest were performed.    COMPARISON:  10/04/2024, CT renal stone study 08/29/2015    FINDINGS:  No detrimental change.  Trachea is relatively midline and patent.Bibasilar minimal platelike scarring versus atelectasis.  The lungs are otherwise well expanded without  consolidation, pleural effusion or pneumothorax.    Cardiomediastinal silhouette is midline with similar calcification and tortuosity of the aorta.  Heart is not significantly enlarged.  Pulmonary vasculature and hilar contours are within normal limits.    Osseous structures appear stable without acute findings.  Cholecystectomy clips noted.                                       Medications - No data to display  Medical Decision Making  73 yo F with PMHx of breast cancer (in remission 8 years) asthma, COPD, DMII, HTN, and HLD presents to the ED with URI symptoms x2 days. She reports her symptoms began with an asthma flare with SOB and wheezing, and then she developed a sore throat, chills, and congestion. She reports a near-syncopal episode in the ED today. She has attempted Tx with breathing Tx's and tylenol at home without relief. No known sick contacts recently but states she went to a [employer] party a few days ago with 40 people. No other exacerbating or alleviating factors. Denies N/V/D, fever, CP, SOB, or other associated symptoms.     On exam, heart sounds normal, breath sounds clear, abdomen soft and non-tender. In shared decision making with patient, will order EKG, COVID/Flu swabs, CXR, and orthostatics. She is not orthostatic. She is +covid, no flu. No pneumonia. Will treat with paxlovid (hold lipitor), astelin. Afrin nasal spray, prednisone, mucinex dm, and tessalon perles.     Amount and/or Complexity of Data Reviewed  Labs: ordered. Decision-making details documented in ED Course.  Radiology: ordered. Decision-making details documented in ED Course.  ECG/medicine tests: ordered and independent interpretation performed. Decision-making details documented in ED Course.    Risk  OTC drugs.  Prescription drug management.            Scribe Attestation:   Scribe #1: I performed the above scribed service and the documentation accurately describes the services I performed. I attest to the accuracy of the  note.                           I, Dr. Bhavana Flores, personally performed the services described in this documentation.   All medical record entries made by the scribe were at my direction and in my presence.   I have reviewed the chart and agree that the record is accurate and complete.   Bhavana Flores MD.  12:32 PM 12/28/2024         Clinical Impression:  Final diagnoses:  [R53.1] Weakness  [R05.9] Cough  [U07.1] COVID-19 virus infection (Primary)          ED Disposition Condition    Discharge Stable          ED Prescriptions       Medication Sig Dispense Start Date End Date Auth. Provider    nirmatrelvir-ritonavir 300 mg (150 mg x 2)-100 mg copackaged tablets (EUA) Take 3 tablets by mouth 2 (two) times daily. Each dose contains 2 nirmatrelvir (pink tablets) and 1 ritonavir (white tablet). Take all 3 tablets together 1 tablet 12/28/2024 -- Bhavana Flores MD    predniSONE (DELTASONE) 20 MG tablet Take 2 tablets (40 mg total) by mouth once daily. for 5 days 10 tablet 12/28/2024 1/2/2025 Bhavana Flores MD    dextromethorphan-guaiFENesin 60-1,200 mg per 12 hr tablet Take 1 tablet by mouth every 12 (twelve) hours. for 7 days 14 tablet 12/28/2024 1/4/2025 Bhavana Flores MD    azelastine (ASTELIN) 137 mcg (0.1 %) nasal spray 1 spray (137 mcg total) by Nasal route 2 (two) times daily. 30 mL 12/28/2024 12/28/2025 Bhavana Flores MD    benzonatate (TESSALON) 200 MG capsule Take 1 capsule (200 mg total) by mouth 3 (three) times daily as needed for Cough. 30 capsule 12/28/2024 1/7/2025 Bhavaan Flores MD          Follow-up Information       Follow up With Specialties Details Why Contact [facility] ED Emergency Medicine  If symptoms worsen [address] [facility] [id number] [phone]             Bhavana Flores MD  12/28/24 1304       Bhavana Flores MD  12/28/24 0132

## 2024-12-28 NOTE — DISCHARGE INSTRUCTIONS
You have covid. Take medications as directed. Use your inhaler as needed and take tylenol for fever or pain. Rest. Drink lots of water. Do not take your LIPITOR while taking PAXLOVID. Stay away from others until you feel better. Return to ER for any concerns or worsening symptoms.

## 2025-01-01 LAB
OHS QRS DURATION: 78 MS
OHS QTC CALCULATION: 466 MS

## 2025-02-13 ENCOUNTER — HOSPITAL ENCOUNTER (EMERGENCY)
Facility: HOSPITAL | Age: 75
Discharge: HOME | End: 2025-02-13
Attending: EMERGENCY MEDICINE
Payer: MEDICARE

## 2025-02-13 VITALS
TEMPERATURE: 98 F | SYSTOLIC BLOOD PRESSURE: 168 MMHG | DIASTOLIC BLOOD PRESSURE: 79 MMHG | HEIGHT: 62 IN | WEIGHT: 199 LBS | HEART RATE: 101 BPM | BODY MASS INDEX: 36.62 KG/M2 | RESPIRATION RATE: 18 BRPM | OXYGEN SATURATION: 99 %

## 2025-02-13 DIAGNOSIS — R00.0 TACHYCARDIA: ICD-10-CM

## 2025-02-13 DIAGNOSIS — J06.9 VIRAL URI WITH COUGH: Primary | ICD-10-CM

## 2025-02-13 DIAGNOSIS — R05.9 COUGH: ICD-10-CM

## 2025-02-13 LAB
CTP QC/QA: YES
INFLUENZA A ANTIGEN, POC: NEGATIVE
INFLUENZA B ANTIGEN, POC: NEGATIVE
OHS QRS DURATION: 76 MS
OHS QTC CALCULATION: 474 MS
POC RAPID STREP A: NEGATIVE
POC RSV RAPID ANT MOLECULAR: NEGATIVE

## 2025-02-13 PROCEDURE — 87880 STREP A ASSAY W/OPTIC: CPT | Mod: ER

## 2025-02-13 PROCEDURE — 87804 INFLUENZA ASSAY W/OPTIC: CPT | Mod: 59,ER

## 2025-02-13 PROCEDURE — 93005 ELECTROCARDIOGRAM TRACING: CPT | Mod: ER

## 2025-02-13 PROCEDURE — 99284 EMERGENCY DEPT VISIT MOD MDM: CPT | Mod: 25,ER

## 2025-02-13 RX ORDER — CETIRIZINE HYDROCHLORIDE 10 MG/1
10 TABLET ORAL DAILY
Qty: 30 TABLET | Refills: 0 | Status: SHIPPED | OUTPATIENT
Start: 2025-02-13 | End: 2025-03-15

## 2025-02-13 RX ORDER — FLUTICASONE PROPIONATE 50 MCG
1 SPRAY, SUSPENSION (ML) NASAL DAILY
Qty: 16 G | Refills: 0 | Status: SHIPPED | OUTPATIENT
Start: 2025-02-13

## 2025-02-13 NOTE — ED PROVIDER NOTES
Encounter Date: 2/13/2025    SCRIBE #1 NOTE: I, Delbert López, am scribing for, and in the presence of,  Neris Singh MD. I have scribed the following portions of the note - Other sections scribed: HPI, ROS, PE.   SCRIBE #2 NOTE: I, Sunil Orlando Do, am scribing for, and in the presence of,  Neris Singh MD. I have scribed the following portions of the note - Other sections scribed: HPI, ROS, PE.     History     Chief Complaint   Patient presents with    Cough     Right ear pain, cough, URI symptoms onset 2-3 days ago. PMH of asthma.     Otaida Pierre is a 75 y.o. female, with a pertinent PMHx of arthritis, diabetes, asthma, COPD, GERD, HLD, and HTN, who presents to the ED with chest tightness onset 3 days. Patient also reports sore throat, ear pain, and wheezing. She reports wheezing has improved following attempted treatment with her inhaler. She notes a previous COVID infection about 1 month ago, endorses adherence with prescribed medications. Per chart review 12/28/2025, patient was seen at [facility] ED for URI concerns. Patient resulted COVID-19 positive. Patient discharged with Astelin, Tessalon, Dextromethorphan-guaifenesin, nirmatrelvir-ritonavir, and prednisone. Patient reports attempted treatment with honey and lemon. She also reports attempted treatment with ibuprofen and throat lozenges without relief. No other exacerbating or alleviating factors. Patient denies chest pain, fever, diarrhea, or other associated symptoms. Pt is taking ibuprofen along with honey and lemon. Patient denies any alcohol, smoking, or other illicit drug use.     The history is provided by the patient. No  was used.     Review of patient's allergies indicates:   Allergen Reactions    Sulfa (sulfonamide antibiotics) Itching and Rash     Past Medical History:   Diagnosis Date    Arthritis     Asthma     Carpal tunnel syndrome 1/2/2014    COPD (chronic obstructive pulmonary disease)     Diabetes  mellitus type II, uncontrolled 1/23/2017    Diabetes mellitus, type 2     DJD (degenerative joint disease) of knee 1/2/2014    Ductal carcinoma in situ (DCIS) of left breast 12/22/2016    GERD (gastroesophageal reflux disease)     History of colonic polyps 1/23/2017    Around 2015    Hyperlipidemia     Hypertension     Intrinsic asthma, unspecified 1/2/2014    Kidney stones 1/23/2017    On CT done by GI in 2015 -sent to Dr Sragent?    Periumbilical abdominal pain 1/23/2017    Seen by Cristhian Mena 10/15 -ct, colon and EGD (normal EGD)     Past Surgical History:   Procedure Laterality Date    BREAST SURGERY      CHOLECYSTECTOMY      COLONOSCOPY      CYSTOSCOPY W/ LASER LITHOTRIPSY      TOTAL KNEE ARTHROPLASTY Left 2/14/2022    Procedure: ARTHROPLASTY, KNEE, TOTAL: LEFT: DEPUY-SIGMA;  Surgeon: Steven Kapadia III, MD;  Location: [facility];  Service: Orthopedics;  Laterality: Left;     Family History   Problem Relation Name Age of Onset    Colon cancer Father      Heart disease Mother  50    Hypertension Mother      Diabetes Mother      Hyperlipidemia Mother      Breast cancer Neg Hx      Ovarian cancer Neg Hx       Social History     Tobacco Use    Smoking status: Never    Smokeless tobacco: Never   Substance Use Topics    Alcohol use: Never    Drug use: No     Review of Systems   Constitutional:  Negative for chills and fever.   HENT:  Positive for ear pain and sore throat. Negative for congestion.    Eyes:  Negative for pain.   Respiratory:  Positive for chest tightness and wheezing. Negative for shortness of breath.    Cardiovascular:  Negative for chest pain.   Gastrointestinal:  Negative for abdominal pain and diarrhea.   Genitourinary:  Negative for flank pain.   Musculoskeletal:  Negative for myalgias.   Skin:  Negative for color change.   Neurological:  Negative for weakness and headaches.   Hematological:  Does not bruise/bleed easily.   Psychiatric/Behavioral:  Negative for suicidal ideas.    All other systems  reviewed and are negative.      Physical Exam     Initial Vitals [02/13/25 1314]   BP Pulse Resp Temp SpO2   (!) 174/82 (!) 117 (!) 22 98.3 °F (36.8 °C) 98 %      MAP       --         Physical Exam    Nursing note and vitals reviewed.  Constitutional: She appears well-developed and well-nourished. No distress.   HENT:   Head: Normocephalic and atraumatic. Mouth/Throat: Oropharynx is clear and moist.   Eyes: Conjunctivae and EOM are normal. Pupils are equal, round, and reactive to light. Right eye exhibits no discharge. Left eye exhibits no discharge.   Neck: Neck supple.   Normal range of motion.  Cardiovascular:  Regular rhythm.   Tachycardia present.   Exam reveals no decreased pulses.       No murmur heard.  Pulmonary/Chest: Breath sounds normal. No respiratory distress. She has no wheezes. She has no rhonchi. She has no rales.   Abdominal: Abdomen is soft. She exhibits no distension.   Musculoskeletal:         General: No tenderness. Normal range of motion.      Cervical back: Normal range of motion and neck supple.     Neurological: She is alert.   Skin: Skin is warm and dry.   Psychiatric: She has a normal mood and affect.         ED Course   Procedures  Labs Reviewed   POCT RESPIRATORY SYNCYTIAL VIRUS BY MOLECULAR       Result Value    POC RSV Rapid Ant Molecular Negative       Acceptable Yes     POCT INFLUENZA A/B MOLECULAR   POCT STREP A MOLECULAR   POCT RAPID INFLUENZA A/B    Influenza B Ag negative      Inflenza A Ag negative     POCT STREP A, RAPID    POC Rapid Strep A negative            Imaging Results              X-Ray Chest PA And Lateral (Final result)  Result time 02/13/25 14:13:37      Final result by Willi Bautista MD (02/13/25 14:13:37)                   Impression:      1. Chronic appearing interstitial findings, no large focal consolidation.      Electronically signed by: Willi Bautista MD  Date:    02/13/2025  Time:    14:13               Narrative:    EXAMINATION:  XR  CHEST PA AND LATERAL    CLINICAL HISTORY:  Cough, unspecified    TECHNIQUE:  PA and lateral views of the chest were performed.    COMPARISON:  12/28/2024    FINDINGS:  The cardiomediastinal silhouette is not enlarged noting calcification of the aorta..  There is no pleural effusion.  The trachea is midline.  The lungs are symmetrically expanded bilaterally with coarse interstitial attenuation, similar to the previous exam accentuated by habitus..  No large focal consolidation seen.  There is no pneumothorax.  The osseous structures are remarkable for degenerative changes of the spine..                                       Medications - No data to display  Medical Decision Making  This is an urgent evaluation of a 75-year-old woman presenting to the emergency department today for evaluation of a cough, right ear pain, URI symptoms.  Differential diagnoses included influenza, pneumonia, viral upper respiratory tract infection, amongst others.  On physical examination, patient was in no acute distress.  Lung sounds were clear to auscultation in all lung fields.  Heart sounds were mildly tachycardic.  She had no lower extremity edema.  She was afebrile.  Her oxygen saturation was 98%.  EKG was obtained and heart rate was found to be 100 beats per minute.  No STEMI.  Influenza swab was negative.  I did not test her for COVID-19 as she was positive on December 28th and positivity can remain via PCR testing for up to 3 months.  Patient was treated with Paxlovid at that time.  Chest x-ray returned with chronic appearing interstitial findings without focal consolidation.  Findings today were similar to previous per Radiology interpretation.  I suspect patient has a viral upper respiratory tract infection.  I will discharge her to home with symptomatic care with fluticasone and Zyrtec as she stated she does not have any of these types of medications at this time and close follow-up with her primary care physician.  Return  precautions provided all questions and concerns addressed.    Neris Herrera MD  2:36 PM  2/13/2025       Amount and/or Complexity of Data Reviewed  External Data Reviewed: notes.     Details: See HPI.  Labs: ordered. Decision-making details documented in ED Course.  Radiology: ordered. Decision-making details documented in ED Course.    Risk  OTC drugs.            Scribe Attestation:   Scribe #1: I performed the above scribed service and the documentation accurately describes the services I performed. I attest to the accuracy of the note.  Scribe #2: I performed the above scribed service and the documentation accurately describes the services I performed. I attest to the accuracy of the note.                               Clinical Impression:  Final diagnoses:  [R05.9] Cough  [R00.0] Tachycardia  [J06.9] Viral URI with cough (Primary)       I, Neris Herrera , personally performed the services described in this documentation. All medical record entries made by the scribe were at my direction and in my presence. I have reviewed the chart and agree that the record reflects my personal performance and is accurate and complete.      DISCLAIMER: This note was prepared with [employer] voice recognition transcription software. Garbled syntax, mangled pronouns, and other bizarre constructions may be attributed to that software system.         ED Disposition Condition    Discharge Stable          ED Prescriptions       Medication Sig Dispense Start Date End Date Auth. Provider    fluticasone propionate (FLONASE) 50 mcg/actuation nasal spray 1 spray (50 mcg total) by Each Nostril route once daily. 16 g 2/13/2025 -- Neris Herrera MD    cetirizine (ZYRTEC) 10 MG tablet Take 1 tablet (10 mg total) by mouth once daily. 30 tablet 2/13/2025 3/15/2025 Neris Herrera MD          Follow-up Information       Follow up With Specialties Details Why Contact Info    Ovidio Woods MD Internal Medicine Schedule an appointment as soon as possible  for a visit in 1 week  [address] [facility]  [place] [address]  [phone]               Neris Singh MD  02/13/25 0925

## (undated) DEVICE — SEE MEDLINE ITEM 157144

## (undated) DEVICE — DRESSING AQUACEL AG RBBN 2X45

## (undated) DEVICE — TOWEL OR XRAY WHITE 17X26IN

## (undated) DEVICE — DRAPE SURG W/TWL 17 5/8X23

## (undated) DEVICE — KIT IRR SUCTION HND PIECE

## (undated) DEVICE — PAD KNEE POLAR XL

## (undated) DEVICE — UNDERGLOVES BIOGEL PI SIZE 7.5

## (undated) DEVICE — UNDERGLOVES BIOGEL PI SIZE 8.5

## (undated) DEVICE — KIT TOTAL KNEE TKOFG

## (undated) DEVICE — SPONGE GAUZE 16PLY 4X4

## (undated) DEVICE — ADHESIVE DERMABOND ADVANCED

## (undated) DEVICE — SUT QUILL PDO VIOL CP 45CM 2

## (undated) DEVICE — CONTAINER SPECIMEN OR STER 4OZ

## (undated) DEVICE — CATH SUCTION 10FR

## (undated) DEVICE — BLADE SAGITTAL 18 X 1.27 X 90M

## (undated) DEVICE — SEE MEDLINE ITEM 146298

## (undated) DEVICE — SYR 50CC LL

## (undated) DEVICE — DRAPE INCISE IOBAN 2 23X33IN

## (undated) DEVICE — PUMP COLD THERAPY

## (undated) DEVICE — DRESSING TRANS 4X4 TEGADERM

## (undated) DEVICE — GAUZE SPONGE 4X4 12PLY

## (undated) DEVICE — SOL BETADINE 5%

## (undated) DEVICE — SUT 1 36IN COATED VICRYL UN

## (undated) DEVICE — MARKER SKIN STND TIP BLUE BARR

## (undated) DEVICE — SUT 2/0 36IN COATED VICRYL

## (undated) DEVICE — SOL IRR NACL .9% 3000ML

## (undated) DEVICE — GLOVE BIOGEL PI MICRO SZ 7

## (undated) DEVICE — SUT MCRYL PLUS 4-0 PS2 27IN

## (undated) DEVICE — BLADE RECIP DS OFST 70X1X12.5

## (undated) DEVICE — GOWN SMARTGOWN LVL4 X-LONG XL

## (undated) DEVICE — NDL 18GA X1 1/2 REG BEVEL

## (undated) DEVICE — SEE MEDLINE ITEM 157150

## (undated) DEVICE — GOWN SMARTGOWN 3XL XLONG

## (undated) DEVICE — MASK FLYTE HOOD PEEL AWAY

## (undated) DEVICE — DRESSING TELFA N ADH 3X8

## (undated) DEVICE — BRUSH SCRUB HIBICLENS 4%

## (undated) DEVICE — ELECTRODE REM PLYHSV RETURN 9

## (undated) DEVICE — BLADE DUAL CUT SAG 35X64X.89MM

## (undated) DEVICE — ALCOHOL 70% ISOP W/GREEN 16OZ

## (undated) DEVICE — SYS REVOLUTION CEMENT MIXING

## (undated) DEVICE — TAPE SILK 3IN

## (undated) DEVICE — SYS KNEE EPAK PIN ATTUNE
Type: IMPLANTABLE DEVICE | Site: KNEE | Status: NON-FUNCTIONAL
Removed: 2022-02-14

## (undated) DEVICE — GLOVE BIOGEL SKINSENSE PI 8.0